# Patient Record
Sex: MALE | Race: WHITE | Employment: FULL TIME | URBAN - METROPOLITAN AREA
[De-identification: names, ages, dates, MRNs, and addresses within clinical notes are randomized per-mention and may not be internally consistent; named-entity substitution may affect disease eponyms.]

---

## 2018-07-21 ENCOUNTER — HOSPITAL ENCOUNTER (OUTPATIENT)
Age: 58
Discharge: HOME | End: 2018-07-21
Payer: COMMERCIAL

## 2018-07-21 LAB
ALBUMIN SERPL-MCNC: 4.6 G/DL (ref 3.5–5.2)
ALP BLD-CCNC: 34 U/L (ref 40–129)
ALT SERPL-CCNC: 20 U/L (ref 0–40)
ANION GAP SERPL CALCULATED.3IONS-SCNC: 11 MMOL/L (ref 7–16)
AST SERPL-CCNC: 18 U/L (ref 0–39)
BACTERIA: NORMAL /HPF
BASOPHILS ABSOLUTE: 0.09 E9/L (ref 0–0.2)
BASOPHILS RELATIVE PERCENT: 1 % (ref 0–2)
BILIRUB SERPL-MCNC: 0.6 MG/DL (ref 0–1.2)
BILIRUBIN URINE: NEGATIVE
BLOOD, URINE: ABNORMAL
BUN BLDV-MCNC: 26 MG/DL (ref 6–20)
CALCIUM SERPL-MCNC: 9.9 MG/DL (ref 8.6–10.2)
CHLORIDE BLD-SCNC: 102 MMOL/L (ref 98–107)
CHOLESTEROL, TOTAL: 151 MG/DL (ref 0–199)
CLARITY: CLEAR
CO2: 25 MMOL/L (ref 22–29)
COLOR: YELLOW
CREAT SERPL-MCNC: 1.2 MG/DL (ref 0.7–1.2)
EOSINOPHILS ABSOLUTE: 0.52 E9/L (ref 0.05–0.5)
EOSINOPHILS RELATIVE PERCENT: 6 % (ref 0–6)
GFR AFRICAN AMERICAN: >60
GFR NON-AFRICAN AMERICAN: >60 ML/MIN/1.73
GLUCOSE BLD-MCNC: 128 MG/DL (ref 74–109)
GLUCOSE URINE: NEGATIVE MG/DL
HBA1C MFR BLD: 6.1 % (ref 4–5.6)
HCT VFR BLD CALC: 41.9 % (ref 37–54)
HDLC SERPL-MCNC: 34 MG/DL
HEMOGLOBIN: 13.8 G/DL (ref 12.5–16.5)
IMMATURE GRANULOCYTES #: 0.1 E9/L
IMMATURE GRANULOCYTES %: 1.1 % (ref 0–5)
KETONES, URINE: NEGATIVE MG/DL
LDL CHOLESTEROL CALCULATED: 73 MG/DL (ref 0–99)
LEUKOCYTE ESTERASE, URINE: NEGATIVE
LYMPHOCYTES ABSOLUTE: 2.87 E9/L (ref 1.5–4)
LYMPHOCYTES RELATIVE PERCENT: 33 % (ref 20–42)
MCH RBC QN AUTO: 29.8 PG (ref 26–35)
MCHC RBC AUTO-ENTMCNC: 32.9 % (ref 32–34.5)
MCV RBC AUTO: 90.5 FL (ref 80–99.9)
MONOCYTES ABSOLUTE: 0.74 E9/L (ref 0.1–0.95)
MONOCYTES RELATIVE PERCENT: 8.5 % (ref 2–12)
NEUTROPHILS ABSOLUTE: 4.39 E9/L (ref 1.8–7.3)
NEUTROPHILS RELATIVE PERCENT: 50.4 % (ref 43–80)
NITRITE, URINE: NEGATIVE
PDW BLD-RTO: 13.2 FL (ref 11.5–15)
PH UA: 6 (ref 5–9)
PLATELET # BLD: 308 E9/L (ref 130–450)
PMV BLD AUTO: 8.6 FL (ref 7–12)
POTASSIUM SERPL-SCNC: 4.6 MMOL/L (ref 3.5–5)
PROSTATE SPECIFIC ANTIGEN: 1.44 NG/ML (ref 0–4)
PROTEIN UA: NEGATIVE MG/DL
RBC # BLD: 4.63 E12/L (ref 3.8–5.8)
RBC UA: NORMAL /HPF (ref 0–2)
SODIUM BLD-SCNC: 138 MMOL/L (ref 132–146)
SPECIFIC GRAVITY UA: 1.02 (ref 1–1.03)
TOTAL PROTEIN: 7.3 G/DL (ref 6.4–8.3)
TRIGL SERPL-MCNC: 218 MG/DL (ref 0–149)
UROBILINOGEN, URINE: 0.2 E.U./DL
VLDLC SERPL CALC-MCNC: 44 MG/DL
WBC # BLD: 8.7 E9/L (ref 4.5–11.5)
WBC UA: NORMAL /HPF (ref 0–5)

## 2018-07-21 PROCEDURE — 81001 URINALYSIS AUTO W/SCOPE: CPT

## 2018-07-21 PROCEDURE — 80053 COMPREHEN METABOLIC PANEL: CPT

## 2018-07-21 PROCEDURE — 80061 LIPID PANEL: CPT

## 2018-07-21 PROCEDURE — 85025 COMPLETE CBC W/AUTO DIFF WBC: CPT

## 2018-07-21 PROCEDURE — G0103 PSA SCREENING: HCPCS

## 2018-07-21 PROCEDURE — 36415 COLL VENOUS BLD VENIPUNCTURE: CPT

## 2018-07-21 PROCEDURE — 83036 HEMOGLOBIN GLYCOSYLATED A1C: CPT

## 2019-02-23 ENCOUNTER — HOSPITAL ENCOUNTER (OUTPATIENT)
Age: 59
Discharge: HOME | End: 2019-02-23
Payer: COMMERCIAL

## 2019-02-23 LAB
ALBUMIN SERPL-MCNC: 4.8 G/DL (ref 3.5–5.2)
ALP BLD-CCNC: 38 U/L (ref 40–129)
ALT SERPL-CCNC: 26 U/L (ref 0–40)
ANION GAP SERPL CALCULATED.3IONS-SCNC: 13 MMOL/L (ref 7–16)
AST SERPL-CCNC: 21 U/L (ref 0–39)
BASOPHILS ABSOLUTE: 0.07 E9/L (ref 0–0.2)
BASOPHILS RELATIVE PERCENT: 1 % (ref 0–2)
BILIRUB SERPL-MCNC: 0.8 MG/DL (ref 0–1.2)
BILIRUBIN URINE: NEGATIVE
BLOOD, URINE: NEGATIVE
BUN BLDV-MCNC: 24 MG/DL (ref 6–20)
CALCIUM SERPL-MCNC: 9.9 MG/DL (ref 8.6–10.2)
CHLORIDE BLD-SCNC: 102 MMOL/L (ref 98–107)
CHOLESTEROL, TOTAL: 136 MG/DL (ref 0–199)
CLARITY: CLEAR
CO2: 26 MMOL/L (ref 22–29)
COLOR: YELLOW
CREAT SERPL-MCNC: 1.2 MG/DL (ref 0.7–1.2)
EOSINOPHILS ABSOLUTE: 0.45 E9/L (ref 0.05–0.5)
EOSINOPHILS RELATIVE PERCENT: 6.1 % (ref 0–6)
GFR AFRICAN AMERICAN: >60
GFR NON-AFRICAN AMERICAN: >60 ML/MIN/1.73
GLUCOSE BLD-MCNC: 135 MG/DL (ref 74–99)
GLUCOSE URINE: NEGATIVE MG/DL
HBA1C MFR BLD: 6.2 % (ref 4–5.6)
HCT VFR BLD CALC: 44.2 % (ref 37–54)
HDLC SERPL-MCNC: 36 MG/DL
HEMOGLOBIN: 14.5 G/DL (ref 12.5–16.5)
IMMATURE GRANULOCYTES #: 0.07 E9/L
IMMATURE GRANULOCYTES %: 1 % (ref 0–5)
KETONES, URINE: NEGATIVE MG/DL
LDL CHOLESTEROL CALCULATED: 60 MG/DL (ref 0–99)
LEUKOCYTE ESTERASE, URINE: NEGATIVE
LYMPHOCYTES ABSOLUTE: 2.52 E9/L (ref 1.5–4)
LYMPHOCYTES RELATIVE PERCENT: 34.4 % (ref 20–42)
MCH RBC QN AUTO: 31 PG (ref 26–35)
MCHC RBC AUTO-ENTMCNC: 32.8 % (ref 32–34.5)
MCV RBC AUTO: 94.6 FL (ref 80–99.9)
MONOCYTES ABSOLUTE: 0.63 E9/L (ref 0.1–0.95)
MONOCYTES RELATIVE PERCENT: 8.6 % (ref 2–12)
NEUTROPHILS ABSOLUTE: 3.58 E9/L (ref 1.8–7.3)
NEUTROPHILS RELATIVE PERCENT: 48.9 % (ref 43–80)
NITRITE, URINE: NEGATIVE
PDW BLD-RTO: 12.6 FL (ref 11.5–15)
PH UA: 7 (ref 5–9)
PLATELET # BLD: 307 E9/L (ref 130–450)
PMV BLD AUTO: 8.8 FL (ref 7–12)
POTASSIUM SERPL-SCNC: 4.5 MMOL/L (ref 3.5–5)
PROTEIN UA: NEGATIVE MG/DL
RBC # BLD: 4.67 E12/L (ref 3.8–5.8)
SODIUM BLD-SCNC: 141 MMOL/L (ref 132–146)
SPECIFIC GRAVITY UA: 1.01 (ref 1–1.03)
TOTAL PROTEIN: 7.6 G/DL (ref 6.4–8.3)
TRIGL SERPL-MCNC: 201 MG/DL (ref 0–149)
UROBILINOGEN, URINE: 0.2 E.U./DL
VLDLC SERPL CALC-MCNC: 40 MG/DL
WBC # BLD: 7.3 E9/L (ref 4.5–11.5)

## 2019-02-23 PROCEDURE — 83036 HEMOGLOBIN GLYCOSYLATED A1C: CPT

## 2019-02-23 PROCEDURE — 85025 COMPLETE CBC W/AUTO DIFF WBC: CPT

## 2019-02-23 PROCEDURE — 36415 COLL VENOUS BLD VENIPUNCTURE: CPT

## 2019-02-23 PROCEDURE — 81003 URINALYSIS AUTO W/O SCOPE: CPT

## 2019-02-23 PROCEDURE — 80061 LIPID PANEL: CPT

## 2019-02-23 PROCEDURE — 80053 COMPREHEN METABOLIC PANEL: CPT

## 2019-09-06 ENCOUNTER — HOSPITAL ENCOUNTER (OUTPATIENT)
Age: 59
Discharge: HOME | End: 2019-09-06
Payer: COMMERCIAL

## 2019-09-06 LAB
ALBUMIN SERPL-MCNC: 4.9 G/DL (ref 3.5–5.2)
ALP BLD-CCNC: 40 U/L (ref 40–129)
ALT SERPL-CCNC: 19 U/L (ref 0–40)
ANION GAP SERPL CALCULATED.3IONS-SCNC: 13 MMOL/L (ref 7–16)
AST SERPL-CCNC: 21 U/L (ref 0–39)
BILIRUB SERPL-MCNC: 0.8 MG/DL (ref 0–1.2)
BUN BLDV-MCNC: 27 MG/DL (ref 8–23)
CALCIUM SERPL-MCNC: 10 MG/DL (ref 8.6–10.2)
CHLORIDE BLD-SCNC: 102 MMOL/L (ref 98–107)
CHOLESTEROL, TOTAL: 147 MG/DL (ref 0–199)
CO2: 26 MMOL/L (ref 22–29)
CREAT SERPL-MCNC: 1.2 MG/DL (ref 0.7–1.2)
CREATININE URINE: 88 MG/DL (ref 40–278)
GFR AFRICAN AMERICAN: >60
GFR NON-AFRICAN AMERICAN: >60 ML/MIN/1.73
GLUCOSE BLD-MCNC: 144 MG/DL (ref 74–99)
HBA1C MFR BLD: 6.3 % (ref 4–5.6)
HDLC SERPL-MCNC: 34 MG/DL
LDL CHOLESTEROL CALCULATED: 70 MG/DL (ref 0–99)
MICROALBUMIN UR-MCNC: <12 MG/L
MICROALBUMIN/CREAT UR-RTO: ABNORMAL (ref 0–30)
POTASSIUM SERPL-SCNC: 4.6 MMOL/L (ref 3.5–5)
SODIUM BLD-SCNC: 141 MMOL/L (ref 132–146)
TOTAL PROTEIN: 7.5 G/DL (ref 6.4–8.3)
TRIGL SERPL-MCNC: 213 MG/DL (ref 0–149)
VLDLC SERPL CALC-MCNC: 43 MG/DL

## 2019-09-06 PROCEDURE — 36415 COLL VENOUS BLD VENIPUNCTURE: CPT

## 2019-09-06 PROCEDURE — 83036 HEMOGLOBIN GLYCOSYLATED A1C: CPT

## 2019-09-06 PROCEDURE — 80053 COMPREHEN METABOLIC PANEL: CPT

## 2019-09-06 PROCEDURE — 86787 VARICELLA-ZOSTER ANTIBODY: CPT

## 2019-09-06 PROCEDURE — 82044 UR ALBUMIN SEMIQUANTITATIVE: CPT

## 2019-09-06 PROCEDURE — 82570 ASSAY OF URINE CREATININE: CPT

## 2019-09-06 PROCEDURE — 80061 LIPID PANEL: CPT

## 2019-09-10 LAB — VARICELLA-ZOSTER VIRUS AB, IGG: NORMAL

## 2020-03-12 ENCOUNTER — HOSPITAL ENCOUNTER (OUTPATIENT)
Age: 60
Discharge: HOME | End: 2020-03-12
Payer: COMMERCIAL

## 2020-03-12 LAB
ALBUMIN SERPL-MCNC: 4.9 G/DL (ref 3.5–5.2)
ALP BLD-CCNC: 42 U/L (ref 40–129)
ALT SERPL-CCNC: 28 U/L (ref 0–40)
ANION GAP SERPL CALCULATED.3IONS-SCNC: 14 MMOL/L (ref 7–16)
AST SERPL-CCNC: 24 U/L (ref 0–39)
BACTERIA: NORMAL /HPF
BASOPHILS ABSOLUTE: 0.06 E9/L (ref 0–0.2)
BASOPHILS RELATIVE PERCENT: 0.8 % (ref 0–2)
BILIRUB SERPL-MCNC: 1 MG/DL (ref 0–1.2)
BILIRUBIN URINE: NEGATIVE
BLOOD, URINE: NORMAL
BUN BLDV-MCNC: 21 MG/DL (ref 8–23)
CALCIUM SERPL-MCNC: 10 MG/DL (ref 8.6–10.2)
CHLORIDE BLD-SCNC: 101 MMOL/L (ref 98–107)
CHOLESTEROL, TOTAL: 143 MG/DL (ref 0–199)
CLARITY: CLEAR
CO2: 24 MMOL/L (ref 22–29)
COLOR: YELLOW
CREAT SERPL-MCNC: 1.2 MG/DL (ref 0.7–1.2)
EOSINOPHILS ABSOLUTE: 0.37 E9/L (ref 0.05–0.5)
EOSINOPHILS RELATIVE PERCENT: 4.7 % (ref 0–6)
GFR AFRICAN AMERICAN: >60
GFR NON-AFRICAN AMERICAN: >60 ML/MIN/1.73
GLUCOSE BLD-MCNC: 136 MG/DL (ref 74–99)
GLUCOSE URINE: NEGATIVE MG/DL
HBA1C MFR BLD: 6.7 % (ref 4–5.6)
HCT VFR BLD CALC: 44.4 % (ref 37–54)
HDLC SERPL-MCNC: 34 MG/DL
HEMOGLOBIN: 14.8 G/DL (ref 12.5–16.5)
IMMATURE GRANULOCYTES #: 0.04 E9/L
IMMATURE GRANULOCYTES %: 0.5 % (ref 0–5)
KETONES, URINE: NEGATIVE MG/DL
LDL CHOLESTEROL CALCULATED: 63 MG/DL (ref 0–99)
LEUKOCYTE ESTERASE, URINE: NEGATIVE
LYMPHOCYTES ABSOLUTE: 2.76 E9/L (ref 1.5–4)
LYMPHOCYTES RELATIVE PERCENT: 35.4 % (ref 20–42)
MCH RBC QN AUTO: 31.2 PG (ref 26–35)
MCHC RBC AUTO-ENTMCNC: 33.3 % (ref 32–34.5)
MCV RBC AUTO: 93.5 FL (ref 80–99.9)
MONOCYTES ABSOLUTE: 0.79 E9/L (ref 0.1–0.95)
MONOCYTES RELATIVE PERCENT: 10.1 % (ref 2–12)
NEUTROPHILS ABSOLUTE: 3.78 E9/L (ref 1.8–7.3)
NEUTROPHILS RELATIVE PERCENT: 48.5 % (ref 43–80)
NITRITE, URINE: NEGATIVE
PDW BLD-RTO: 12.8 FL (ref 11.5–15)
PH UA: 6 (ref 5–9)
PLATELET # BLD: 298 E9/L (ref 130–450)
PMV BLD AUTO: 8.6 FL (ref 7–12)
POTASSIUM SERPL-SCNC: 4 MMOL/L (ref 3.5–5)
PROSTATE SPECIFIC ANTIGEN: 1.26 NG/ML (ref 0–4)
PROTEIN UA: NEGATIVE MG/DL
RBC # BLD: 4.75 E12/L (ref 3.8–5.8)
RBC UA: NORMAL /HPF (ref 0–2)
SODIUM BLD-SCNC: 139 MMOL/L (ref 132–146)
SPECIFIC GRAVITY UA: 1.02 (ref 1–1.03)
TOTAL PROTEIN: 7.7 G/DL (ref 6.4–8.3)
TRIGL SERPL-MCNC: 228 MG/DL (ref 0–149)
TSH SERPL DL<=0.05 MIU/L-ACNC: 2.58 UIU/ML (ref 0.27–4.2)
UROBILINOGEN, URINE: 0.2 E.U./DL
VLDLC SERPL CALC-MCNC: 46 MG/DL
WBC # BLD: 7.8 E9/L (ref 4.5–11.5)
WBC UA: NORMAL /HPF (ref 0–5)

## 2020-03-12 PROCEDURE — 83036 HEMOGLOBIN GLYCOSYLATED A1C: CPT

## 2020-03-12 PROCEDURE — 84443 ASSAY THYROID STIM HORMONE: CPT

## 2020-03-12 PROCEDURE — 81001 URINALYSIS AUTO W/SCOPE: CPT

## 2020-03-12 PROCEDURE — G0103 PSA SCREENING: HCPCS

## 2020-03-12 PROCEDURE — 80053 COMPREHEN METABOLIC PANEL: CPT

## 2020-03-12 PROCEDURE — 80061 LIPID PANEL: CPT

## 2020-03-12 PROCEDURE — 85025 COMPLETE CBC W/AUTO DIFF WBC: CPT

## 2020-03-12 PROCEDURE — 36415 COLL VENOUS BLD VENIPUNCTURE: CPT

## 2020-09-12 ENCOUNTER — HOSPITAL ENCOUNTER (OUTPATIENT)
Age: 60
Discharge: HOME | End: 2020-09-12
Payer: COMMERCIAL

## 2020-09-12 LAB
ALBUMIN SERPL-MCNC: 4.9 G/DL (ref 3.5–5.2)
ALP BLD-CCNC: 39 U/L (ref 40–129)
ALT SERPL-CCNC: 24 U/L (ref 0–40)
ANION GAP SERPL CALCULATED.3IONS-SCNC: 11 MMOL/L (ref 7–16)
AST SERPL-CCNC: 26 U/L (ref 0–39)
BILIRUB SERPL-MCNC: 0.8 MG/DL (ref 0–1.2)
BUN BLDV-MCNC: 30 MG/DL (ref 8–23)
CALCIUM SERPL-MCNC: 9.8 MG/DL (ref 8.6–10.2)
CHLORIDE BLD-SCNC: 102 MMOL/L (ref 98–107)
CHOLESTEROL, TOTAL: 125 MG/DL (ref 0–199)
CO2: 25 MMOL/L (ref 22–29)
CREAT SERPL-MCNC: 1.1 MG/DL (ref 0.7–1.2)
CREATININE URINE: 174 MG/DL (ref 40–278)
GFR AFRICAN AMERICAN: >60
GFR NON-AFRICAN AMERICAN: >60 ML/MIN/1.73
GLUCOSE BLD-MCNC: 138 MG/DL (ref 74–99)
HBA1C MFR BLD: 6.3 % (ref 4–5.6)
HDLC SERPL-MCNC: 36 MG/DL
LDL CHOLESTEROL CALCULATED: 55 MG/DL (ref 0–99)
MICROALBUMIN UR-MCNC: <12 MG/L
MICROALBUMIN/CREAT UR-RTO: ABNORMAL (ref 0–30)
POTASSIUM SERPL-SCNC: 4.2 MMOL/L (ref 3.5–5)
SODIUM BLD-SCNC: 138 MMOL/L (ref 132–146)
TOTAL PROTEIN: 7.6 G/DL (ref 6.4–8.3)
TRIGL SERPL-MCNC: 169 MG/DL (ref 0–149)
VLDLC SERPL CALC-MCNC: 34 MG/DL

## 2020-09-12 PROCEDURE — 83036 HEMOGLOBIN GLYCOSYLATED A1C: CPT

## 2020-09-12 PROCEDURE — 80061 LIPID PANEL: CPT

## 2020-09-12 PROCEDURE — 80053 COMPREHEN METABOLIC PANEL: CPT

## 2020-09-12 PROCEDURE — 36415 COLL VENOUS BLD VENIPUNCTURE: CPT

## 2020-09-12 PROCEDURE — 82570 ASSAY OF URINE CREATININE: CPT

## 2020-09-12 PROCEDURE — 82044 UR ALBUMIN SEMIQUANTITATIVE: CPT

## 2021-02-12 ENCOUNTER — HOSPITAL ENCOUNTER (OUTPATIENT)
Age: 61
Discharge: HOME | End: 2021-02-12
Payer: COMMERCIAL

## 2021-02-12 LAB
ALBUMIN SERPL-MCNC: 4.9 G/DL (ref 3.5–5.2)
ALP BLD-CCNC: 38 U/L (ref 40–129)
ALT SERPL-CCNC: 23 U/L (ref 0–40)
ANION GAP SERPL CALCULATED.3IONS-SCNC: 11 MMOL/L (ref 7–16)
AST SERPL-CCNC: 19 U/L (ref 0–39)
BILIRUB SERPL-MCNC: 0.8 MG/DL (ref 0–1.2)
BUN BLDV-MCNC: 34 MG/DL (ref 8–23)
CALCIUM SERPL-MCNC: 10.2 MG/DL (ref 8.6–10.2)
CHLORIDE BLD-SCNC: 99 MMOL/L (ref 98–107)
CHOLESTEROL, TOTAL: 146 MG/DL (ref 0–199)
CO2: 28 MMOL/L (ref 22–29)
CREAT SERPL-MCNC: 1.1 MG/DL (ref 0.7–1.2)
GFR AFRICAN AMERICAN: >60
GFR NON-AFRICAN AMERICAN: >60 ML/MIN/1.73
GLUCOSE BLD-MCNC: 141 MG/DL (ref 74–99)
HBA1C MFR BLD: 6.4 % (ref 4–5.6)
HCT VFR BLD CALC: 44.3 % (ref 37–54)
HDLC SERPL-MCNC: 37 MG/DL
HEMOGLOBIN: 14.8 G/DL (ref 12.5–16.5)
LDL CHOLESTEROL CALCULATED: 70 MG/DL (ref 0–99)
MCH RBC QN AUTO: 31.6 PG (ref 26–35)
MCHC RBC AUTO-ENTMCNC: 33.4 % (ref 32–34.5)
MCV RBC AUTO: 94.7 FL (ref 80–99.9)
PDW BLD-RTO: 12.9 FL (ref 11.5–15)
PLATELET # BLD: 295 E9/L (ref 130–450)
PMV BLD AUTO: 8.7 FL (ref 7–12)
POTASSIUM SERPL-SCNC: 4.5 MMOL/L (ref 3.5–5)
PROSTATE SPECIFIC ANTIGEN: 1.22 NG/ML (ref 0–4)
RBC # BLD: 4.68 E12/L (ref 3.8–5.8)
SODIUM BLD-SCNC: 138 MMOL/L (ref 132–146)
TOTAL PROTEIN: 7.6 G/DL (ref 6.4–8.3)
TRIGL SERPL-MCNC: 196 MG/DL (ref 0–149)
VLDLC SERPL CALC-MCNC: 39 MG/DL
WBC # BLD: 7.5 E9/L (ref 4.5–11.5)

## 2021-02-12 PROCEDURE — 83036 HEMOGLOBIN GLYCOSYLATED A1C: CPT

## 2021-02-12 PROCEDURE — G0103 PSA SCREENING: HCPCS

## 2021-02-12 PROCEDURE — 80053 COMPREHEN METABOLIC PANEL: CPT

## 2021-02-12 PROCEDURE — 36415 COLL VENOUS BLD VENIPUNCTURE: CPT

## 2021-02-12 PROCEDURE — 80061 LIPID PANEL: CPT

## 2021-02-12 PROCEDURE — 85027 COMPLETE CBC AUTOMATED: CPT

## 2021-02-17 ENCOUNTER — HOSPITAL ENCOUNTER (EMERGENCY)
Age: 61
Discharge: HOME | End: 2021-02-17
Attending: EMERGENCY MEDICINE
Payer: COMMERCIAL

## 2021-02-17 ENCOUNTER — APPOINTMENT (OUTPATIENT)
Dept: CT IMAGING | Age: 61
End: 2021-02-17
Payer: COMMERCIAL

## 2021-02-17 ENCOUNTER — APPOINTMENT (OUTPATIENT)
Dept: GENERAL RADIOLOGY | Age: 61
End: 2021-02-17
Payer: COMMERCIAL

## 2021-02-17 VITALS
HEART RATE: 94 BPM | BODY MASS INDEX: 32.47 KG/M2 | DIASTOLIC BLOOD PRESSURE: 70 MMHG | OXYGEN SATURATION: 98 % | TEMPERATURE: 98.9 F | WEIGHT: 245 LBS | HEIGHT: 73 IN | RESPIRATION RATE: 16 BRPM | SYSTOLIC BLOOD PRESSURE: 148 MMHG

## 2021-02-17 DIAGNOSIS — S09.90XA CLOSED HEAD INJURY, INITIAL ENCOUNTER: Primary | ICD-10-CM

## 2021-02-17 DIAGNOSIS — S06.0X0A CONCUSSION WITHOUT LOSS OF CONSCIOUSNESS, INITIAL ENCOUNTER: ICD-10-CM

## 2021-02-17 PROCEDURE — 99283 EMERGENCY DEPT VISIT LOW MDM: CPT

## 2021-02-17 PROCEDURE — 71046 X-RAY EXAM CHEST 2 VIEWS: CPT

## 2021-02-17 PROCEDURE — 72125 CT NECK SPINE W/O DYE: CPT

## 2021-02-17 PROCEDURE — 70450 CT HEAD/BRAIN W/O DYE: CPT

## 2021-02-17 RX ORDER — MECLIZINE HCL 12.5 MG/1
12.5 TABLET ORAL 3 TIMES DAILY PRN
Qty: 30 TABLET | Refills: 0 | Status: SHIPPED | OUTPATIENT
Start: 2021-02-17 | End: 2021-02-27

## 2021-02-17 ASSESSMENT — ENCOUNTER SYMPTOMS
ABDOMINAL PAIN: 0
NAUSEA: 0
VOMITING: 0
TROUBLE SWALLOWING: 0
DIARRHEA: 0
SHORTNESS OF BREATH: 0
RHINORRHEA: 0
COUGH: 0
COLOR CHANGE: 0
BLOOD IN STOOL: 0

## 2021-02-17 ASSESSMENT — PAIN DESCRIPTION - ONSET: ONSET: ON-GOING

## 2021-02-17 ASSESSMENT — PAIN DESCRIPTION - DESCRIPTORS: DESCRIPTORS: SORE

## 2021-02-17 ASSESSMENT — PAIN SCALES - GENERAL: PAINLEVEL_OUTOF10: 5

## 2021-02-17 ASSESSMENT — PAIN DESCRIPTION - LOCATION: LOCATION: GENERALIZED

## 2021-02-17 ASSESSMENT — PAIN DESCRIPTION - PAIN TYPE: TYPE: ACUTE PAIN

## 2021-02-17 NOTE — ED PROVIDER NOTES
mellitus ([facility] [address]     dx 10/13; type II    Environmental allergies     Heart murmur     Heart murmur     Hyperlipidemia     Hypertension     stable per patient    Sleep apnea     uses c pap       Past Surgical History:   Past Surgical History:   Procedure Laterality Date    COLONOSCOPY  10/13/11    COLONOSCOPY  11/09/2016    DR BARRAZA    ECHO COMPL W DOP COLOR FLOW  2/27/2013         NASAL SINUS SURGERY      times 4    SINUS SURGERY Bilateral 7/18/2014    TONSILLECTOMY         Social History:   Social History     Socioeconomic History    Marital status:      Spouse name: Not on file    Number of children: 1    Years of education: 25    Highest education level: Not on file   Occupational History    Not on file   Social Needs    Financial resource strain: Not on file    Food insecurity     Worry: Not on file     Inability: Not on file   [employer] needs     Medical: Not on file     Non-medical: Not on file   Tobacco Use    Smoking status: Never Smoker   Substance and Sexual Activity    Alcohol use: No    Drug use: No    Sexual activity: Not on file   Lifestyle    Physical activity     Days per week: Not on file     Minutes per session: Not on file    Stress: Not on file   Relationships    Social connections     Talks on phone: Not on file     Gets together: Not on file     Attends Worship service: Not on file     Active member of club or organization: Not on file     Attends meetings of clubs or organizations: Not on file     Relationship status: Not on file    Intimate partner violence     Fear of current or ex partner: Not on file     Emotionally abused: Not on file     Physically abused: Not on file     Forced sexual activity: Not on file   Other Topics Concern    Not on file   Social History Narrative    Not on file       Family History:   No family history on file. The patients home medications have been reviewed.     Allergies:   No Known Allergies        ---------------------------------------------------PHYSICAL EXAM--------------------------------------    BP (!) 148/70   Pulse 94   Temp 98.9 °F (37.2 °C)   Resp 16   SpO2 98%     Physical Exam  Vitals signs and nursing note reviewed. Constitutional:       General: He is not in acute distress. Appearance: He is not toxic-appearing. HENT:      Head: Normocephalic and atraumatic. Mouth/Throat:      Mouth: Mucous membranes are moist.   Eyes:      General: No scleral icterus. Extraocular Movements: Extraocular movements intact. Pupils: Pupils are equal, round, and reactive to light. Neck:      Musculoskeletal: Normal range of motion and neck supple. Muscular tenderness (mild upper c-spine midline ttp. no stepoff/deformity) present. No neck rigidity. Cardiovascular:      Rate and Rhythm: Normal rate and regular rhythm. Pulses: Normal pulses. Heart sounds: Normal heart sounds. No murmur. Pulmonary:      Effort: Pulmonary effort is normal. No respiratory distress. Breath sounds: Normal breath sounds. No wheezing or rales. Chest:      Chest wall: No tenderness. Abdominal:      General: There is no distension. Palpations: Abdomen is soft. Tenderness: There is no abdominal tenderness. There is no guarding or rebound. Musculoskeletal: Normal range of motion. General: No swelling or tenderness. Skin:     General: Skin is warm and dry. Neurological:      Mental Status: He is alert and oriented to person, place, and time.       Comments: PERRL, EOMI, FS, TM, facial sensation intact to light touch bilaterally, shoulder shrug intact bilaterally, Strength 5/5 and sensation grossly intact to light touch and equal bilaterally throughout all extremities, no dysmetria, no ataxia, normal gait.            -------------------------------------------------- RESULTS -------------------------------------------------  I have personally reviewed all --------------------------------- IMPRESSION AND DISPOSITION ---------------------------------    IMPRESSION  1. Closed head injury, initial encounter    2. Concussion without loss of consciousness, initial encounter        DISPOSITION  Disposition: Discharge to home  Patient condition is good    NOTE: This report was transcribed using voice recognition software.  Every effort was made to ensure accuracy; however, inadvertent computerized transcription errors may be present    Marvin Bland MD  Attending Emergency Physician         Marvin Bland MD  02/17/21 8541

## 2022-04-01 ENCOUNTER — HOSPITAL ENCOUNTER (OUTPATIENT)
Age: 62
Discharge: HOME | End: 2022-04-01
Payer: COMMERCIAL

## 2022-04-01 LAB
ALBUMIN SERPL-MCNC: 5 G/DL (ref 3.5–5.2)
ALP BLD-CCNC: 34 U/L (ref 40–129)
ALT SERPL-CCNC: 30 U/L (ref 0–40)
ANION GAP SERPL CALCULATED.3IONS-SCNC: 12 MMOL/L (ref 7–16)
AST SERPL-CCNC: 31 U/L (ref 0–39)
BASOPHILS ABSOLUTE: 0.06 E9/L (ref 0–0.2)
BASOPHILS RELATIVE PERCENT: 0.9 % (ref 0–2)
BILIRUB SERPL-MCNC: 0.7 MG/DL (ref 0–1.2)
BUN BLDV-MCNC: 25 MG/DL (ref 6–23)
CALCIUM SERPL-MCNC: 10 MG/DL (ref 8.6–10.2)
CHLORIDE BLD-SCNC: 103 MMOL/L (ref 98–107)
CHOLESTEROL, TOTAL: 133 MG/DL (ref 0–199)
CO2: 25 MMOL/L (ref 22–29)
CREAT SERPL-MCNC: 1.1 MG/DL (ref 0.7–1.2)
CREATININE URINE: 80 MG/DL (ref 40–278)
EOSINOPHILS ABSOLUTE: 0.43 E9/L (ref 0.05–0.5)
EOSINOPHILS RELATIVE PERCENT: 6.7 % (ref 0–6)
GFR AFRICAN AMERICAN: >60
GFR NON-AFRICAN AMERICAN: >60 ML/MIN/1.73
GLUCOSE BLD-MCNC: 125 MG/DL (ref 74–99)
HBA1C MFR BLD: 5.7 % (ref 4–5.6)
HCT VFR BLD CALC: 41.6 % (ref 37–54)
HDLC SERPL-MCNC: 45 MG/DL
HEMOGLOBIN: 14 G/DL (ref 12.5–16.5)
IMMATURE GRANULOCYTES #: 0.02 E9/L
IMMATURE GRANULOCYTES %: 0.3 % (ref 0–5)
LDL CHOLESTEROL CALCULATED: 65 MG/DL (ref 0–99)
LYMPHOCYTES ABSOLUTE: 2.21 E9/L (ref 1.5–4)
LYMPHOCYTES RELATIVE PERCENT: 34.6 % (ref 20–42)
MCH RBC QN AUTO: 31.3 PG (ref 26–35)
MCHC RBC AUTO-ENTMCNC: 33.7 % (ref 32–34.5)
MCV RBC AUTO: 92.9 FL (ref 80–99.9)
MICROALBUMIN UR-MCNC: <12 MG/L
MICROALBUMIN/CREAT UR-RTO: ABNORMAL (ref 0–30)
MONOCYTES ABSOLUTE: 0.56 E9/L (ref 0.1–0.95)
MONOCYTES RELATIVE PERCENT: 8.8 % (ref 2–12)
NEUTROPHILS ABSOLUTE: 3.1 E9/L (ref 1.8–7.3)
NEUTROPHILS RELATIVE PERCENT: 48.7 % (ref 43–80)
PDW BLD-RTO: 12.8 FL (ref 11.5–15)
PLATELET # BLD: 309 E9/L (ref 130–450)
PMV BLD AUTO: 8.5 FL (ref 7–12)
POTASSIUM SERPL-SCNC: 3.9 MMOL/L (ref 3.5–5)
PROSTATE SPECIFIC ANTIGEN: 1.61 NG/ML (ref 0–4)
RBC # BLD: 4.48 E12/L (ref 3.8–5.8)
SODIUM BLD-SCNC: 140 MMOL/L (ref 132–146)
TOTAL PROTEIN: 7.5 G/DL (ref 6.4–8.3)
TRIGL SERPL-MCNC: 113 MG/DL (ref 0–149)
VLDLC SERPL CALC-MCNC: 23 MG/DL
WBC # BLD: 6.4 E9/L (ref 4.5–11.5)

## 2022-04-01 PROCEDURE — 36415 COLL VENOUS BLD VENIPUNCTURE: CPT

## 2022-04-01 PROCEDURE — G0103 PSA SCREENING: HCPCS

## 2022-04-01 PROCEDURE — 80053 COMPREHEN METABOLIC PANEL: CPT

## 2022-04-01 PROCEDURE — 82570 ASSAY OF URINE CREATININE: CPT

## 2022-04-01 PROCEDURE — 85025 COMPLETE CBC W/AUTO DIFF WBC: CPT

## 2022-04-01 PROCEDURE — 80061 LIPID PANEL: CPT

## 2022-04-01 PROCEDURE — 82044 UR ALBUMIN SEMIQUANTITATIVE: CPT

## 2022-04-01 PROCEDURE — 83036 HEMOGLOBIN GLYCOSYLATED A1C: CPT

## 2022-10-07 ENCOUNTER — HOSPITAL ENCOUNTER (OUTPATIENT)
Age: 62
Discharge: HOME | End: 2022-10-07
Payer: COMMERCIAL

## 2022-10-07 LAB
ALBUMIN SERPL-MCNC: 4.8 G/DL (ref 3.5–5.2)
ALP BLD-CCNC: 39 U/L (ref 40–129)
ALT SERPL-CCNC: 29 U/L (ref 0–40)
ANION GAP SERPL CALCULATED.3IONS-SCNC: 11 MMOL/L (ref 7–16)
AST SERPL-CCNC: 27 U/L (ref 0–39)
BILIRUB SERPL-MCNC: 0.5 MG/DL (ref 0–1.2)
BUN BLDV-MCNC: 23 MG/DL (ref 6–23)
CALCIUM SERPL-MCNC: 9.9 MG/DL (ref 8.6–10.2)
CHLORIDE BLD-SCNC: 101 MMOL/L (ref 98–107)
CHOLESTEROL, TOTAL: 152 MG/DL (ref 0–199)
CO2: 26 MMOL/L (ref 22–29)
CREAT SERPL-MCNC: 1.2 MG/DL (ref 0.7–1.2)
GFR AFRICAN AMERICAN: >60
GFR NON-AFRICAN AMERICAN: >60 ML/MIN/1.73
GLUCOSE BLD-MCNC: 120 MG/DL (ref 74–99)
HBA1C MFR BLD: 5.8 % (ref 4–5.6)
HDLC SERPL-MCNC: 39 MG/DL
LDL CHOLESTEROL CALCULATED: 87 MG/DL (ref 0–99)
POTASSIUM SERPL-SCNC: 3.9 MMOL/L (ref 3.5–5)
SODIUM BLD-SCNC: 138 MMOL/L (ref 132–146)
TOTAL PROTEIN: 7.3 G/DL (ref 6.4–8.3)
TRIGL SERPL-MCNC: 129 MG/DL (ref 0–149)
VLDLC SERPL CALC-MCNC: 26 MG/DL

## 2022-10-07 PROCEDURE — 80053 COMPREHEN METABOLIC PANEL: CPT

## 2022-10-07 PROCEDURE — 36415 COLL VENOUS BLD VENIPUNCTURE: CPT

## 2022-10-07 PROCEDURE — 83036 HEMOGLOBIN GLYCOSYLATED A1C: CPT

## 2022-10-07 PROCEDURE — 80061 LIPID PANEL: CPT

## 2022-11-01 RX ORDER — FENOFIBRATE 160 MG/1
160 TABLET ORAL DAILY
COMMUNITY

## 2022-11-01 NOTE — PROGRESS NOTES
[facility] PRE-ADMISSION TESTING INSTRUCTIONS    The Preadmission Testing patient is instructed accordingly using the following criteria (check applicable):    ARRIVAL INSTRUCTIONS:  [x] Parking the day of Surgery is located in the Main Entrance lot. Upon entering the door, make an immediate right to the surgery reception desk    [x] Bring photo ID and insurance card    [x] Please be sure to arrange for responsible adult to provide transportation to and from the hospital    [x] Please arrange for responsible adult to be with you for the 24 hour period post procedure due to having anesthesia    [x] If you awake am of surgery not feeling well or have temperature >100 please call [phone]    GENERAL INSTRUCTIONS:    [x] Nothing by mouth after midnight, including gum, candy, mints or water    [x] You may brush your teeth, but do not swallow any water    [x] Stop herbal supplements and vitamins 5 days prior to procedure    [] Follow preop dosing of blood thinners per physician instructions    [x] No oral diabetic medications after midnight    [x] If diabetic and have low blood sugar or feel symptomatic, take 1-2oz apple juice only    [x] Shower or bath with soap, lather and rinse well, AM of Surgery, no lotion, powders or creams to surgical site    [x] No tobacco products within 24 hours of surgery     [x] No alcohol or illegal drug use within 24 hours of surgery.     [x] Jewelry, body piercing's, eyeglasses, contact lenses and dentures are not permitted into surgery (bring cases)      [x] Please do not wear any nail polish, make up or hair products on the day of surgery    [x] You can expect a call the business day prior to procedure to notify you if your arrival time changes    [x] If you receive a survey after surgery we would greatly appreciate your comments    [x] Please notify surgeon if you develop any illness between now and time of surgery (cold, cough, sore throat, fever, nausea, vomiting) or any signs of infections  including skin, wounds, and dental.    [x]  The Outpatient Pharmacy is available to fill your prescription here on your day of surgery, ask your preop nurse for details

## 2022-11-03 ENCOUNTER — ANESTHESIA EVENT (OUTPATIENT)
Dept: OPERATING ROOM | Age: 62
End: 2022-11-03
Payer: COMMERCIAL

## 2022-11-04 ENCOUNTER — ANESTHESIA (OUTPATIENT)
Dept: OPERATING ROOM | Age: 62
End: 2022-11-04
Payer: COMMERCIAL

## 2022-11-04 ENCOUNTER — HOSPITAL ENCOUNTER (OUTPATIENT)
Age: 62
Setting detail: OUTPATIENT SURGERY
Discharge: HOME | End: 2022-11-04
Attending: OTOLARYNGOLOGY | Admitting: OTOLARYNGOLOGY
Payer: COMMERCIAL

## 2022-11-04 VITALS
RESPIRATION RATE: 18 BRPM | HEIGHT: 72 IN | BODY MASS INDEX: 31.83 KG/M2 | TEMPERATURE: 97.8 F | WEIGHT: 235 LBS | DIASTOLIC BLOOD PRESSURE: 81 MMHG | HEART RATE: 77 BPM | SYSTOLIC BLOOD PRESSURE: 141 MMHG | OXYGEN SATURATION: 95 %

## 2022-11-04 DIAGNOSIS — J32.4 CHRONIC PANSINUSITIS: ICD-10-CM

## 2022-11-04 DIAGNOSIS — J34.89 NASAL OBSTRUCTION: ICD-10-CM

## 2022-11-04 DIAGNOSIS — G89.18 POST-OP PAIN: Primary | ICD-10-CM

## 2022-11-04 LAB
ANION GAP SERPL CALCULATED.3IONS-SCNC: 12 MMOL/L (ref 7–16)
BUN BLDV-MCNC: 23 MG/DL (ref 6–23)
CALCIUM SERPL-MCNC: 10.1 MG/DL (ref 8.6–10.2)
CHLORIDE BLD-SCNC: 102 MMOL/L (ref 98–107)
CO2: 23 MMOL/L (ref 22–29)
CREAT SERPL-MCNC: 1.1 MG/DL (ref 0.7–1.2)
GFR SERPL CREATININE-BSD FRML MDRD: >60 ML/MIN/1.73
GLUCOSE BLD-MCNC: 120 MG/DL (ref 74–99)
HCT VFR BLD CALC: 42.7 % (ref 37–54)
HEMOGLOBIN: 14.4 G/DL (ref 12.5–16.5)
MCH RBC QN AUTO: 31.7 PG (ref 26–35)
MCHC RBC AUTO-ENTMCNC: 33.7 % (ref 32–34.5)
MCV RBC AUTO: 94.1 FL (ref 80–99.9)
PDW BLD-RTO: 12.8 FL (ref 11.5–15)
PLATELET # BLD: 348 E9/L (ref 130–450)
PMV BLD AUTO: 8.3 FL (ref 7–12)
POTASSIUM SERPL-SCNC: 4 MMOL/L (ref 3.5–5)
RBC # BLD: 4.54 E12/L (ref 3.8–5.8)
SODIUM BLD-SCNC: 137 MMOL/L (ref 132–146)
WBC # BLD: 8.6 E9/L (ref 4.5–11.5)

## 2022-11-04 PROCEDURE — 3700000001 HC ADD 15 MINUTES (ANESTHESIA): Performed by: OTOLARYNGOLOGY

## 2022-11-04 PROCEDURE — 2580000003 HC RX 258

## 2022-11-04 PROCEDURE — 7100000001 HC PACU RECOVERY - ADDTL 15 MIN: Performed by: OTOLARYNGOLOGY

## 2022-11-04 PROCEDURE — 6360000002 HC RX W HCPCS

## 2022-11-04 PROCEDURE — 3600000003 HC SURGERY LEVEL 3 BASE: Performed by: OTOLARYNGOLOGY

## 2022-11-04 PROCEDURE — 36415 COLL VENOUS BLD VENIPUNCTURE: CPT

## 2022-11-04 PROCEDURE — 7100000010 HC PHASE II RECOVERY - FIRST 15 MIN: Performed by: OTOLARYNGOLOGY

## 2022-11-04 PROCEDURE — 2709999900 HC NON-CHARGEABLE SUPPLY: Performed by: OTOLARYNGOLOGY

## 2022-11-04 PROCEDURE — 80048 BASIC METABOLIC PNL TOTAL CA: CPT

## 2022-11-04 PROCEDURE — 6370000000 HC RX 637 (ALT 250 FOR IP): Performed by: OTOLARYNGOLOGY

## 2022-11-04 PROCEDURE — 3700000000 HC ANESTHESIA ATTENDED CARE: Performed by: OTOLARYNGOLOGY

## 2022-11-04 PROCEDURE — 3600000013 HC SURGERY LEVEL 3 ADDTL 15MIN: Performed by: OTOLARYNGOLOGY

## 2022-11-04 PROCEDURE — 85027 COMPLETE CBC AUTOMATED: CPT

## 2022-11-04 PROCEDURE — 2720000010 HC SURG SUPPLY STERILE: Performed by: OTOLARYNGOLOGY

## 2022-11-04 PROCEDURE — 6370000000 HC RX 637 (ALT 250 FOR IP)

## 2022-11-04 PROCEDURE — C2625 STENT, NON-COR, TEM W/DEL SY: HCPCS | Performed by: OTOLARYNGOLOGY

## 2022-11-04 PROCEDURE — 6360000002 HC RX W HCPCS: Performed by: OTOLARYNGOLOGY

## 2022-11-04 PROCEDURE — 7100000000 HC PACU RECOVERY - FIRST 15 MIN: Performed by: OTOLARYNGOLOGY

## 2022-11-04 PROCEDURE — C1894 INTRO/SHEATH, NON-LASER: HCPCS | Performed by: OTOLARYNGOLOGY

## 2022-11-04 PROCEDURE — 2500000003 HC RX 250 WO HCPCS

## 2022-11-04 PROCEDURE — 93005 ELECTROCARDIOGRAM TRACING: CPT

## 2022-11-04 PROCEDURE — 2500000003 HC RX 250 WO HCPCS: Performed by: OTOLARYNGOLOGY

## 2022-11-04 PROCEDURE — C1726 CATH, BAL DIL, NON-VASCULAR: HCPCS | Performed by: OTOLARYNGOLOGY

## 2022-11-04 PROCEDURE — 7100000011 HC PHASE II RECOVERY - ADDTL 15 MIN: Performed by: OTOLARYNGOLOGY

## 2022-11-04 PROCEDURE — 2580000003 HC RX 258: Performed by: OTOLARYNGOLOGY

## 2022-11-04 PROCEDURE — 88305 TISSUE EXAM BY PATHOLOGIST: CPT

## 2022-11-04 DEVICE — PROPEL MINI SINUS IMPLANT
Type: IMPLANTABLE DEVICE | Site: NOSE | Status: FUNCTIONAL
Brand: PROPEL MINI

## 2022-11-04 RX ORDER — ROCURONIUM BROMIDE 10 MG/ML
INJECTION, SOLUTION INTRAVENOUS PRN
Status: DISCONTINUED | OUTPATIENT
Start: 2022-11-04 | End: 2022-11-04 | Stop reason: SDUPTHER

## 2022-11-04 RX ORDER — HYDROCODONE BITARTRATE AND ACETAMINOPHEN 5; 325 MG/1; MG/1
1 TABLET ORAL EVERY 6 HOURS PRN
Qty: 12 TABLET | Refills: 0 | Status: SHIPPED | OUTPATIENT
Start: 2022-11-04 | End: 2022-11-07

## 2022-11-04 RX ORDER — SODIUM CHLORIDE 0.9 % (FLUSH) 0.9 %
5-40 SYRINGE (ML) INJECTION PRN
Status: DISCONTINUED | OUTPATIENT
Start: 2022-11-04 | End: 2022-11-04 | Stop reason: HOSPADM

## 2022-11-04 RX ORDER — SODIUM CHLORIDE 9 MG/ML
INJECTION, SOLUTION INTRAVENOUS PRN
Status: DISCONTINUED | OUTPATIENT
Start: 2022-11-04 | End: 2022-11-04 | Stop reason: HOSPADM

## 2022-11-04 RX ORDER — ONDANSETRON 8 MG/1
8 TABLET, ORALLY DISINTEGRATING ORAL EVERY 8 HOURS PRN
Qty: 9 TABLET | Refills: 0 | Status: SHIPPED | OUTPATIENT
Start: 2022-11-04 | End: 2022-11-04 | Stop reason: SDUPTHER

## 2022-11-04 RX ORDER — PROPOFOL 10 MG/ML
INJECTION, EMULSION INTRAVENOUS PRN
Status: DISCONTINUED | OUTPATIENT
Start: 2022-11-04 | End: 2022-11-04 | Stop reason: SDUPTHER

## 2022-11-04 RX ORDER — CEPHALEXIN 500 MG/1
500 CAPSULE ORAL 3 TIMES DAILY
Qty: 15 CAPSULE | Refills: 0 | Status: SHIPPED | OUTPATIENT
Start: 2022-11-04 | End: 2022-11-04 | Stop reason: SDUPTHER

## 2022-11-04 RX ORDER — MORPHINE SULFATE 2 MG/ML
2 INJECTION, SOLUTION INTRAMUSCULAR; INTRAVENOUS EVERY 5 MIN PRN
Status: DISCONTINUED | OUTPATIENT
Start: 2022-11-04 | End: 2022-11-04 | Stop reason: HOSPADM

## 2022-11-04 RX ORDER — BACITRACIN ZINC 500 [USP'U]/G
OINTMENT TOPICAL PRN
Status: DISCONTINUED | OUTPATIENT
Start: 2022-11-04 | End: 2022-11-04 | Stop reason: ALTCHOICE

## 2022-11-04 RX ORDER — MORPHINE SULFATE 2 MG/ML
1 INJECTION, SOLUTION INTRAMUSCULAR; INTRAVENOUS EVERY 5 MIN PRN
Status: DISCONTINUED | OUTPATIENT
Start: 2022-11-04 | End: 2022-11-04 | Stop reason: HOSPADM

## 2022-11-04 RX ORDER — SODIUM CHLORIDE 0.9 % (FLUSH) 0.9 %
5-40 SYRINGE (ML) INJECTION EVERY 12 HOURS SCHEDULED
Status: DISCONTINUED | OUTPATIENT
Start: 2022-11-04 | End: 2022-11-04 | Stop reason: HOSPADM

## 2022-11-04 RX ORDER — MIDAZOLAM HYDROCHLORIDE 1 MG/ML
INJECTION INTRAMUSCULAR; INTRAVENOUS PRN
Status: DISCONTINUED | OUTPATIENT
Start: 2022-11-04 | End: 2022-11-04 | Stop reason: SDUPTHER

## 2022-11-04 RX ORDER — HYDROCODONE BITARTRATE AND ACETAMINOPHEN 5; 325 MG/1; MG/1
1 TABLET ORAL EVERY 6 HOURS PRN
Qty: 12 TABLET | Refills: 0 | Status: SHIPPED | OUTPATIENT
Start: 2022-11-04 | End: 2022-11-04 | Stop reason: SDUPTHER

## 2022-11-04 RX ORDER — SODIUM CHLORIDE, SODIUM LACTATE, POTASSIUM CHLORIDE, CALCIUM CHLORIDE 600; 310; 30; 20 MG/100ML; MG/100ML; MG/100ML; MG/100ML
INJECTION, SOLUTION INTRAVENOUS CONTINUOUS
Status: DISCONTINUED | OUTPATIENT
Start: 2022-11-04 | End: 2022-11-04 | Stop reason: HOSPADM

## 2022-11-04 RX ORDER — GLYCOPYRROLATE 0.2 MG/ML
INJECTION INTRAMUSCULAR; INTRAVENOUS PRN
Status: DISCONTINUED | OUTPATIENT
Start: 2022-11-04 | End: 2022-11-04 | Stop reason: SDUPTHER

## 2022-11-04 RX ORDER — ONDANSETRON 8 MG/1
8 TABLET, ORALLY DISINTEGRATING ORAL EVERY 8 HOURS PRN
Qty: 9 TABLET | Refills: 0 | Status: SHIPPED | OUTPATIENT
Start: 2022-11-04 | End: 2022-11-07

## 2022-11-04 RX ORDER — EPINEPHRINE NASAL SOLUTION 1 MG/ML
SOLUTION NASAL PRN
Status: DISCONTINUED | OUTPATIENT
Start: 2022-11-04 | End: 2022-11-04 | Stop reason: ALTCHOICE

## 2022-11-04 RX ORDER — LABETALOL HYDROCHLORIDE 5 MG/ML
INJECTION, SOLUTION INTRAVENOUS PRN
Status: DISCONTINUED | OUTPATIENT
Start: 2022-11-04 | End: 2022-11-04 | Stop reason: SDUPTHER

## 2022-11-04 RX ORDER — LIDOCAINE HYDROCHLORIDE AND EPINEPHRINE 10; 10 MG/ML; UG/ML
INJECTION, SOLUTION INFILTRATION; PERINEURAL PRN
Status: DISCONTINUED | OUTPATIENT
Start: 2022-11-04 | End: 2022-11-04 | Stop reason: ALTCHOICE

## 2022-11-04 RX ORDER — DEXAMETHASONE SODIUM PHOSPHATE 4 MG/ML
INJECTION, SOLUTION INTRA-ARTICULAR; INTRALESIONAL; INTRAMUSCULAR; INTRAVENOUS; SOFT TISSUE PRN
Status: DISCONTINUED | OUTPATIENT
Start: 2022-11-04 | End: 2022-11-04 | Stop reason: SDUPTHER

## 2022-11-04 RX ORDER — CEPHALEXIN 500 MG/1
500 CAPSULE ORAL 3 TIMES DAILY
Qty: 15 CAPSULE | Refills: 0 | Status: SHIPPED | OUTPATIENT
Start: 2022-11-04 | End: 2022-11-09

## 2022-11-04 RX ORDER — LIDOCAINE HYDROCHLORIDE 20 MG/ML
INJECTION, SOLUTION EPIDURAL; INFILTRATION; INTRACAUDAL; PERINEURAL PRN
Status: DISCONTINUED | OUTPATIENT
Start: 2022-11-04 | End: 2022-11-04 | Stop reason: SDUPTHER

## 2022-11-04 RX ORDER — MEPERIDINE HYDROCHLORIDE 25 MG/ML
12.5 INJECTION INTRAMUSCULAR; INTRAVENOUS; SUBCUTANEOUS EVERY 5 MIN PRN
Status: DISCONTINUED | OUTPATIENT
Start: 2022-11-04 | End: 2022-11-04 | Stop reason: HOSPADM

## 2022-11-04 RX ORDER — SODIUM CHLORIDE 9 MG/ML
INJECTION, SOLUTION INTRAVENOUS CONTINUOUS PRN
Status: DISCONTINUED | OUTPATIENT
Start: 2022-11-04 | End: 2022-11-04 | Stop reason: SDUPTHER

## 2022-11-04 RX ORDER — FENTANYL CITRATE 50 UG/ML
INJECTION, SOLUTION INTRAMUSCULAR; INTRAVENOUS PRN
Status: DISCONTINUED | OUTPATIENT
Start: 2022-11-04 | End: 2022-11-04 | Stop reason: SDUPTHER

## 2022-11-04 RX ORDER — HYDROCODONE BITARTRATE AND ACETAMINOPHEN 5; 325 MG/1; MG/1
1 TABLET ORAL ONCE
Status: COMPLETED | OUTPATIENT
Start: 2022-11-04 | End: 2022-11-04

## 2022-11-04 RX ORDER — SUCCINYLCHOLINE CHLORIDE 20 MG/ML
INJECTION INTRAMUSCULAR; INTRAVENOUS PRN
Status: DISCONTINUED | OUTPATIENT
Start: 2022-11-04 | End: 2022-11-04 | Stop reason: SDUPTHER

## 2022-11-04 RX ORDER — HYDROCODONE BITARTRATE AND ACETAMINOPHEN 5; 325 MG/1; MG/1
TABLET ORAL
Status: COMPLETED
Start: 2022-11-04 | End: 2022-11-04

## 2022-11-04 RX ADMIN — HYDROCODONE BITARTRATE AND ACETAMINOPHEN 1 TABLET: 5; 325 TABLET ORAL at 13:18

## 2022-11-04 RX ADMIN — FENTANYL CITRATE 100 MCG: 50 INJECTION, SOLUTION INTRAMUSCULAR; INTRAVENOUS at 11:09

## 2022-11-04 RX ADMIN — PROPOFOL 50 MG: 10 INJECTION, EMULSION INTRAVENOUS at 10:39

## 2022-11-04 RX ADMIN — ROCURONIUM BROMIDE 50 MG: 10 INJECTION, SOLUTION INTRAVENOUS at 10:29

## 2022-11-04 RX ADMIN — WATER 2000 MG: 1 INJECTION INTRAMUSCULAR; INTRAVENOUS; SUBCUTANEOUS at 10:35

## 2022-11-04 RX ADMIN — LABETALOL HYDROCHLORIDE 5 MG: 5 INJECTION INTRAVENOUS at 10:33

## 2022-11-04 RX ADMIN — SODIUM CHLORIDE: 9 INJECTION, SOLUTION INTRAVENOUS at 10:56

## 2022-11-04 RX ADMIN — LIDOCAINE HYDROCHLORIDE 60 MG: 20 INJECTION, SOLUTION EPIDURAL; INFILTRATION; INTRACAUDAL; PERINEURAL at 10:26

## 2022-11-04 RX ADMIN — SODIUM CHLORIDE: 9 INJECTION, SOLUTION INTRAVENOUS at 10:13

## 2022-11-04 RX ADMIN — SUGAMMADEX 500 MG: 100 INJECTION, SOLUTION INTRAVENOUS at 11:23

## 2022-11-04 RX ADMIN — PROPOFOL 200 MG: 10 INJECTION, EMULSION INTRAVENOUS at 10:26

## 2022-11-04 RX ADMIN — FENTANYL CITRATE 100 MCG: 50 INJECTION, SOLUTION INTRAMUSCULAR; INTRAVENOUS at 10:27

## 2022-11-04 RX ADMIN — DEXAMETHASONE SODIUM PHOSPHATE 8 MG: 4 INJECTION, SOLUTION INTRAMUSCULAR; INTRAVENOUS at 10:29

## 2022-11-04 RX ADMIN — SUCCINYLCHOLINE CHLORIDE 160 MG: 20 INJECTION, SOLUTION INTRAMUSCULAR; INTRAVENOUS at 10:26

## 2022-11-04 RX ADMIN — MIDAZOLAM 2 MG: 1 INJECTION INTRAMUSCULAR; INTRAVENOUS at 10:17

## 2022-11-04 RX ADMIN — GLYCOPYRROLATE 0.2 MG: 0.2 INJECTION, SOLUTION INTRAMUSCULAR; INTRAVENOUS at 10:30

## 2022-11-04 ASSESSMENT — PAIN SCALES - GENERAL
PAINLEVEL_OUTOF10: 0
PAINLEVEL_OUTOF10: 0
PAINLEVEL_OUTOF10: 2
PAINLEVEL_OUTOF10: 0
PAINLEVEL_OUTOF10: 7

## 2022-11-04 ASSESSMENT — LIFESTYLE VARIABLES: SMOKING_STATUS: 0

## 2022-11-04 ASSESSMENT — PAIN DESCRIPTION - PAIN TYPE: TYPE: SURGICAL PAIN

## 2022-11-04 ASSESSMENT — PAIN DESCRIPTION - FREQUENCY: FREQUENCY: CONTINUOUS

## 2022-11-04 ASSESSMENT — PAIN DESCRIPTION - DESCRIPTORS
DESCRIPTORS: DISCOMFORT;PRESSURE
DESCRIPTORS: ACHING;SORE

## 2022-11-04 ASSESSMENT — PAIN DESCRIPTION - LOCATION: LOCATION: FACE

## 2022-11-04 ASSESSMENT — PAIN - FUNCTIONAL ASSESSMENT
PAIN_FUNCTIONAL_ASSESSMENT: 0-10
PAIN_FUNCTIONAL_ASSESSMENT: ACTIVITIES ARE NOT PREVENTED

## 2022-11-04 NOTE — H&P
H&P reviewed, no changes. No issues. Questions and concerns were answered to the patient's satisfaction.  Will proceed with procedure    Will discuss with attending     Electronically signed by Luis Antonio Hernandez DO on 11/4/22 at 9:45 AM EDT

## 2022-11-04 NOTE — ANESTHESIA PRE PROCEDURE
MD        ceFAZolin (ANCEF) 2,000 mg in sterile water 20 mL IV syringe  2,000 mg IntraVENous On Call to [facility] [address] MD           Allergies:  No Known Allergies    Problem List:    Patient Active Problem List   Diagnosis Code    Hypertension I10    Heart valve disease I38    Hyperlipidemia E78.5       Past Medical History:        Diagnosis Date    Chronic sinusitis     disease - for surgical encounter 7/18/14    Diabetes mellitus ([address]     dx 10/13; type II    Environmental allergies     Heart murmur     Hyperlipidemia     Hypertension     stable per patient    Sleep apnea     uses c pap       Past Surgical History:        Procedure Laterality Date    COLONOSCOPY  10/13/11    COLONOSCOPY  11/09/2016    DR BARRAZA    ECHO COMPL W DOP COLOR FLOW  2/27/2013         NASAL SINUS SURGERY      times 4    SINUS SURGERY Bilateral 7/18/2014    TONSILLECTOMY         Social History:    Social History     Tobacco Use    Smoking status: Never    Smokeless tobacco: Never   Substance Use Topics    Alcohol use: No                                Counseling given: Not Answered      Vital Signs (Current):   Vitals:    11/01/22 1431 11/04/22 0832   BP:  (!) 141/71   Pulse:  73   Resp:  18   Temp:  36.6 °C (97.9 °F)   SpO2:  96%   Weight: 235 lb (106.6 kg)    Height: 6' (1.829 m)                                               BP Readings from Last 3 Encounters:   11/04/22 (!) 141/71   02/17/21 (!) 148/70   11/09/16 134/71       NPO Status: Time of last liquid consumption: 1830                        Time of last solid consumption: 1830                        Date of last liquid consumption: 11/03/22                        Date of last solid food consumption: 11/03/22    BMI:   Wt Readings from Last 3 Encounters:   11/01/22 235 lb (106.6 kg)   02/17/21 245 lb (111.1 kg)   11/09/16 250 lb (113.4 kg)     Body mass index is 31.87 kg/m².     CBC:   Lab Results   Component Value Date/Time    WBC 8.6 11/04/2022 08:22 AM    RBC 4.54 11/04/2022 08:22 AM    HGB 14.4 11/04/2022 08:22 AM    HCT 42.7 11/04/2022 08:22 AM    MCV 94.1 11/04/2022 08:22 AM    RDW 12.8 11/04/2022 08:22 AM     11/04/2022 08:22 AM       CMP:   Lab Results   Component Value Date/Time     11/04/2022 08:22 AM    K 4.0 11/04/2022 08:22 AM     11/04/2022 08:22 AM    CO2 23 11/04/2022 08:22 AM    BUN 23 11/04/2022 08:22 AM    CREATININE 1.1 11/04/2022 08:22 AM    GFRAA >60 10/07/2022 07:24 AM    LABGLOM >60 11/04/2022 08:22 AM    GLUCOSE 120 11/04/2022 08:22 AM    GLUCOSE 123 05/26/2012 07:55 AM    PROT 7.3 10/07/2022 07:24 AM    CALCIUM 10.1 11/04/2022 08:22 AM    BILITOT 0.5 10/07/2022 07:24 AM    ALKPHOS 39 10/07/2022 07:24 AM    AST 27 10/07/2022 07:24 AM    ALT 29 10/07/2022 07:24 AM       POC Tests: No results for input(s): POCGLU, POCNA, POCK, POCCL, POCBUN, POCHEMO, POCHCT in the last 72 hours.     Coags: No results found for: PROTIME, INR, APTT    HCG (If Applicable): No results found for: PREGTESTUR, PREGSERUM, HCG, HCGQUANT     ABGs: No results found for: PHART, PO2ART, NLF2ZUU, YDH9JJZ, BEART, O6DIKFGY     Type & Screen (If Applicable):  No results found for: LABABO, LABRH    Drug/Infectious Status (If Applicable):  No results found for: HIV, HEPCAB    COVID-19 Screening (If Applicable): No results found for: COVID19        Anesthesia Evaluation  Patient summary reviewed and Nursing notes reviewed no history of anesthetic complications:   Airway: Mallampati: III  TM distance: >3 FB   Neck ROM: full  Mouth opening: > = 3 FB   Dental: normal exam         Pulmonary: breath sounds clear to auscultation  (+) sleep apnea: on CPAP,      (-) not a current smoker                           Cardiovascular:  Exercise tolerance: good (>4 METS),   (+) hypertension:, valvular problems/murmurs:, pulmonary hypertension ( RVSP 36):,       ECG reviewed  Rhythm: regular  Rate: normal  Echocardiogram reviewed         Beta Blocker:  Not on Beta Blocker         Neuro/Psych:   Negative Neuro/Psych ROS              GI/Hepatic/Renal:             Endo/Other:    (+) DiabetesType II DM, , .                 Abdominal:       Abdomen: soft. Vascular: negative vascular ROS. Other Findings:      Imaging:    EKG      TTE 2/2013  Left ventricle size is normal.    Severe left ventricular concentric hypertrophy. Ejection fraction is visually estimated at 65-70%. Normal right ventricular function. There is doppler evidence of stage II diastolic dysfunction. The left atrium is mildly dilated. Mild mitral regurgitation. Mild aortic regurgitation. Mild aortic stenosis. Mild tricuspid regurgitation. PASP is estimated at 36 mmHg. Borderline dilated aortic root. The inferior vena cava is normal in size with normal respiratory    variation. No evidence of a hemodynamically significant pericardial effusion. Anesthesia Plan      general     ASA 3       Induction: rapid sequence. Anesthetic plan and risks discussed with patient. Use of blood products discussed with patient whom consented to blood products. Blood Products Consent Comment: Pt consented to blood products in all forms                    Alee Ocampo RN   11/4/2022        Pt seen, examined, chart reviewed, plan discussed.   Kalie Harper MD  11/4/2022  10:35 AM

## 2022-11-04 NOTE — PROGRESS NOTES
Prescriptions sent to [facility] on [address], perfect serve Mikey Ibarra, will send to [facility] per patient's request.

## 2022-11-04 NOTE — ANESTHESIA POSTPROCEDURE EVALUATION
Department of Anesthesiology  Postprocedure Note    Patient: Berenice Vitale  MRN: [id number]  YOB: 1960  Date of evaluation: 11/4/2022      Procedure Summary     Date: 11/04/22 Room / Location: [facility] OR 01 / [facility]    Anesthesia Start: 1020 Anesthesia Stop:     Procedure: LEFT ENDOSCOPIC MEDIAL ANTROSTOMY RIGHT MAXILLARY SINUSOTOMY WITH REMOVAL OF SOFT TISSUE BILATERALLY (Bilateral: Nose) Diagnosis:       Chronic pansinusitis      Nasal obstruction      (Chronic pansinusitis [J32.4])      (Nasal obstruction [J34.89])    Surgeons: Cheri Mejia MD Responsible Provider: Behzad Granger MD    Anesthesia Type: general ASA Status: 3          Anesthesia Type: No value filed.     Darrel Phase I: Darrel Score: 10    Darrel Phase II:        Anesthesia Post Evaluation    Patient location during evaluation: PACU  Patient participation: complete - patient participated  Level of consciousness: awake  Pain score: 0  Airway patency: patent  Nausea & Vomiting: no nausea and no vomiting  Complications: no  Cardiovascular status: hemodynamically stable  Respiratory status: acceptable  Hydration status: euvolemic

## 2022-11-04 NOTE — DISCHARGE INSTRUCTIONS
Follow up with Dr. Craig Level and cover when sneezing, coughing  No nose blowing for 2 weeks  Nasal saline spray in each nostril 4-5 times a day  Take medicines as directed  Ice to back of neck for comfort  Sleep with head elevated 30 degrees for the next few days      Endoscopic Sinus Surgery: What to Expect at [address] will have a drip pad under your nose to collect mucus and blood. Change it only when it bleeds through. You may have to do this every hour for 24 hours after surgery. You may have some swelling of your nose, upper lip, cheeks, or around your eyes. Your nose may be sore and will bleed. You may feel \"stuffed up\" like you have a bad head cold. This will last for several days after surgery. The tip of your nose and your upper lip and gums may be numb. Feeling will return in a few weeks to a few months. Your sense of smell will not be as good after surgery. It will improve and probably return to normal in 1 to 2 months. You will probably be able to return to work or school in about 1 week and to your normal routine in about 3 weeks. However, this varies with your job and the extent of your surgery. Most people feel normal in 1 to 2 months. You will have to visit your doctor regularly for 3 to 4 months after your surgery. Your doctor will check to see that your sinuses are healing well. This care sheet gives you a general idea about how long it will take for you to recover. But each person recovers at a different pace. Follow the steps below to get better as quickly as possible. How can you care for yourself at home? Activity  Rest when you feel tired. Getting enough sleep will help you recover. Do not lie flat. Raise your head with three or four pillows. This can reduce swelling. Try to sleep on your back during the month after surgery. You can also sleep in a reclining chair. Try to walk each day. Start by walking a little more than you did the day before.  Bit by bit, increase the amount you walk. Walking boosts blood flow and helps prevent pneumonia and constipation. Also, try to sit and stand as much as you can. For 1 week, try not to bend over or lift anything heavier than 10 pounds. This may include a child, heavy grocery bags and milk containers, a heavy briefcase or backpack, cat litter or dog food bags, or a vacuum . You can take a shower or bath. Use lukewarm, not hot water. Avoid swimming for 6 weeks. Avoid sawdust, chemicals, and excessive dust for 4 weeks. Avoid strenuous activities, such as biking, jogging, weight lifting, or aerobic exercise, for 1 week and then ease back into these activities over 2 to 3 weeks. You may drive when you are no longer taking prescription pain medicine and feel up to it. You will probably be able to return to work or school in about 1 week and to your normal routine in about 3 weeks. However, this varies with your job and the extent of your surgery. Diet  You can eat your normal diet. If your stomach is upset, try bland, low-fat foods like plain rice, broiled chicken, toast, and yogurt. You may notice that your bowel movements are not regular right after your surgery. This is common. Try to avoid constipation and straining with bowel movements. You may want to take a fiber supplement every day. If you have not had a bowel movement after a couple of days, ask your doctor about taking a mild laxative. Medicines  Do not take aspirin, aspirin-containing medicines, or anti-inflammatory medicines such as ibuprofen (Advil, Motrin) or naproxen (Aleve) for 3 weeks following surgery unless your doctor says it is okay. Take pain medicines exactly as directed. If the doctor gave you a prescription medicine for pain, take it as prescribed. Do not take two or more pain medicines at the same time unless the doctor told you to. Many pain medicines have acetaminophen, which is Tylenol.  Too much acetaminophen (Tylenol) can be harmful. If your doctor prescribed antibiotics, take them as directed. Do not stop taking them just because you feel better. You need to take the full course of antibiotics. If you think your pain medicine is making you sick to your stomach: Take your medicine after meals (unless your doctor has told you not to). Ask your doctor for a different pain medicine. Incision care  You probably will not have an incision (cut). You will have a drip pad under your nose to collect blood. Change it only when it has bled through. You may have to do this every hour for 24 hours after surgery. When bleeding stops, you can remove it. If you have packing in your nose, leave it in. Your doctor will take it out. Ice and elevation  To help with swelling and pain, put ice or a cold pack on your nose for 10 to 20 minutes at a time. Put a thin cloth between the ice and your skin. Do not sleep flat. Sleep with your head raised up. You can also sleep in a reclining chair. Other instructions  Do not blow your nose for 2 weeks. Do not put anything into your nose. If you must sneeze, open your mouth and sneeze naturally. Keep your mouth clean. Rinse your mouth with salt water or an alcohol-free mouthwash after each meal and before bedtime. After any packing is removed, use saline (saltwater) nasal washes to help keep your nasal passages open and wash out mucus and bacteria. You can buy saline nose drops at a grocery store or drugstore. You can wear your glasses when you wish. Do not wear contacts until the day after the surgery. Do not travel by airplane for at least 2 weeks. Your sinuses are still healing, and the changes in air pressure can affect them. Follow-up care is a key part of your treatment and safety. Be sure to make and go to all appointments, and call your doctor if you are having problems. It's also a good idea to know your test results and keep a list of the medicines you take.   When should you call for help?  Call 911 anytime you think you may need emergency care. For example, call if:  You passed out (lost consciousness). You have severe trouble breathing. Call your doctor now or seek immediate medical care if:  The dressing soaks through with blood and needs to be changed more than every 15 minutes. You have a fever with a stiff neck or a severe headache. You are sensitive to light, or you feel very sleepy or confused. You have pain that does not get better after you take pain medicine. You have a fever over 100°F.  You have double or blurred vision, cannot close your eyes, or have eye pain. Watch closely for changes in your health, and be sure to contact your doctor if:  You do not have a bowel movement after taking a laxative. Where can you learn more? Go to [email or web address] and sign in to your [employer] account. Enter S802 in the [employer] box to learn more about Endoscopic Sinus Surgery: What to Expect at Home.     If you do not have an account, please click on the Sign Up Now link. © 1671-9296 Healthwise, Incorporated. Care instructions adapted under license by [facility]. This care instruction is for use with your licensed healthcare professional. If you have questions about a medical condition or this instruction, always ask your healthcare professional. [address] any warranty or liability for your use of this information.   Content Version: 01.7.016032; Current as of: October 29, 2013

## 2022-11-04 NOTE — BRIEF OP NOTE
Brief Postoperative Note      Patient: Berenice Vitale  YOB: 1960  MRN: [id number]    Date of Procedure: 11/4/2022    Pre-Op Diagnosis: Chronic pansinusitis [J32.4]  Nasal obstruction [J34.89]    Post-Op Diagnosis: Same       Procedure(s):  LEFT REVISION ENDOSCOPIC ETHMOIDECTOMY WITH REMOVAL OF SOFT TISSUE   RIGHT REVISION ENDOSCOPIC MAXILLARY ANTROSTOMY WITH REMOVAL OF SOFT TISSUE   LEFT REVISION ENDOSCOPIC MAXILLARY ANTROSTOMY WITH REMOVAL OF SOFT TISSUE     Surgeon(s):  Cheri Mejia MD    Assistant:  Resident: Kait Telles DO    Anesthesia: General    Estimated Blood Loss (mL): 27DZ    Complications: None    Specimens:   ID Type Source Tests Collected by Time Destination   A : left nasal contents Tissue Tissue SURGICAL PATHOLOGY Cheri Mejia MD 11/4/2022 1118        Implants:  Implant Name Type Inv. Item Serial No.  Lot No. LRB No. Used Action   STENT NSL L16MM DIA4MM 370UG MINI MOMETASONE FUROATE LO - [id number]  STENT NSL L16MM DIA4MM 370UG MINI MOMETASONE FUROATE LO  [employer]- [id number] Right 1 Implanted   STENT NSL L16MM DIA4MM 370UG MINI MOMETASONE FUROATE LO - [id number]  STENT NSL L16MM DIA4MM 370UG MINI MOMETASONE FUROATE LO  [employer]- [id number] Left 1 Implanted         Drains: * No LDAs found *    Findings: polypoid tissue obstructing left ethmoid and maxillary os.  Right synchea from right middle turbinate to right maxillary os, significant post surgical changes throughout               Electronically signed by Kait Telles DO on 11/4/2022 at 11:30 AM

## 2022-11-04 NOTE — ANESTHESIA POSTPROCEDURE EVALUATION
Department of Anesthesiology  Postprocedure Note    Patient: Diana Bar  MRN: [id number]  YOB: 1960  Date of evaluation: 11/4/2022      Procedure Summary     Date: 11/04/22 Room / Location: [facility] OR 01 / [facility]    Anesthesia Start:  Anesthesia Stop:     Procedure: LEFT ENDOSCOPIC ETHMOIDECTOMY LEFT ENDOSCOPIC MEDIAL ANTROSTOMY RIGHT MAXILLARY SINUSOTOMY WITH REMOVAL OF SOFT TISSUE POSSIBLE BALLOON SINUPLASTY (Bilateral: Nose) Diagnosis:       Chronic pansinusitis      Nasal obstruction      (Chronic pansinusitis [J32.4])      (Nasal obstruction [J34.89])    Surgeons: Lorenza Easley MD Responsible Provider:     Anesthesia Type: Not recorded ASA Status: Not recorded          Anesthesia Type: No value filed.     Darrel Phase I: Darrel Score: 10    Darrel Phase II:        Anesthesia Post Evaluation    Patient location during evaluation: PACU  Patient participation: complete - patient participated  Level of consciousness: awake  Pain score: 3  Airway patency: patent  Nausea & Vomiting: no nausea and no vomiting  Complications: no  Cardiovascular status: blood pressure returned to baseline  Respiratory status: acceptable  Hydration status: euvolemic

## 2022-11-05 NOTE — OP NOTE
[address] [address]                                OPERATIVE REPORT    PATIENT NAME: Lenora Guerrero                   :        1960  MED REC NO:   [id number]                            ROOM:  ACCOUNT NO:   [de-identified]                           ADMIT DATE: 2022  PROVIDER:     Nabil Sultana DO    DATE OF PROCEDURE:  2022    PREOPERATIVE DIAGNOSIS:  Chronic sinusitis. POSTOPERATIVE DIAGNOSIS:  Chronic sinusitis. PROCEDURE PERFORMED:    LEFT REVISION ENDOSCOPIC ETHMOIDECTOMY WITH REMOVAL OF SOFT TISSUE   RIGHT REVISION ENDOSCOPIC MAXILLARY ANTROSTOMY WITH REMOVAL OF SOFT TISSUE   LEFT REVISION ENDOSCOPIC MAXILLARY ANTROSTOMY WITH REMOVAL OF SOFT TISSUE     SURGEON:  Yury Ha MD.    ASSISTANT:  Nabil Sultana DO, PGY-4. ANESTHESIA:  General.    FLUIDS:  IV crystalloid. ESTIMATED BLOOD LOSS:  20 mL. FINDINGS:  Mucosal polyps obstructing the left ostiomeatal complex and  into the ethmoid cavity, right synechiae banding from middle turbinate  to lateral nasal wall, mucosal polypoid changes around the right  maxillary os. Septum straight. Postsurgical changes throughout the  sinus cavity. COMPLICATIONS:  None. SPECIMEN:  Left nasal contents. INDICATIONS FOR PROCEDURE:  This is a 27-year-old male with history of  multiple sinus surgeries for chronic sinusitis with polyps who presents  with worsening of nasal obstructive symptoms as well as congestion and  pressure. Risks, benefits, and alternatives were discussed including,  but not limited to bleeding, infection, damage to adjacent structures,  and need for further procedure. The patient understood and agreed to  proceed with the procedure. DESCRIPTION OF PROCEDURE:  The patient was brought back to the operating  room and placed supine on the table. SCDs were placed and functioning.    The patient was then handed to Anesthesia for proper endotracheal  intubation. The head of the bed was then turned 90 degrees  counterclockwise from the anesthesia unit. The nose was then cleansed  with Betadine swab. A 1% lidocaine with 1:100,000 epinephrine was then  injected in bilateral nasal cavities along the nasal septum, inferior  turbinate, and middle turbinate remnant. A 4% cocaine-soaked pledgets  were then placed in bilateral nasal cavities and allowed to work. The  patient was then prepped and draped in a sterile fashion. Using a  0-degree endoscope, the left side of the nasal cavity was first  evaluated. The left nasal cavity was noted to have extensive crusting  throughout. This was irrigated with normal saline and suctioned clear. Once the nasal cavity was cleared free of crusting and mucus, there were  postsurgical changes noted throughout the left nasal cavity including a  very small remnant of the middle turbinate and surgical changes to the  inferior turbinate. A large maxillary antrostomy was already noted as  well as a large ethmoid cavity. There were polypoid changes filling the  left ethmoid cavity obstructing the left ostiomeatal complex. These  were removed with an upcut forceps manually. There were some polypoid  changes noted around the left maxillary os. These were taken removed using  a microdebrider. Once all the polypoid changes were removed, the  ethmoid cavity and the maxillary sinus cavity, entire nasal cavity, frontal sinus os, were inspected with a 30  and then a 70-degree endoscope. No mucosal disease or polyps were noted  in either cavities. The nasopharynx was then evaluated and noted also  to be clear of any disease. Attention was then turned to the right  nasal cavity. There were synechiae noted to be banding from the remnant  right middle turbinate to lateral nasal wall. This was lysed sharply. There were polypoid changes noted around the right maxillary os.   These  were removed using a microdebrider. Using a 30-degree and then a  70-degree endoscope, the right nasal cavity, frontal sinus os, the right maxillary sinus cavity as well as the  right ethmoid cavity were inspected and noted to be free of mucosal  disease or polyp. Minimal Nasal bleeding was seen in bilateral nasal cavities  and this was cauterized with suction Bovie. Propel mini stents were  then placed in the right ethmoid cavity as well as the left ethmoid  cavity and noted to medialize the middle turbinate. Sinu-Foam was then  placed further medializing the middle turbinate. The patient tolerated  the procedure without complication. The patient was handed back to  Anesthesia for proper awakening. Dr. Ciltaly Castillo was present throughout for  the entire case.         Ernie Mesa DO    D: 11/04/2022 15:24:52       T: 11/05/2022 1:46:38     [id number]  Job#: [id number]     Doc#: [id number]    CC:

## 2022-11-07 LAB
EKG ATRIAL RATE: 77 BPM
EKG P AXIS: 3 DEGREES
EKG P-R INTERVAL: 216 MS
EKG Q-T INTERVAL: 404 MS
EKG QRS DURATION: 108 MS
EKG QTC CALCULATION (BAZETT): 457 MS
EKG T AXIS: 104 DEGREES
EKG VENTRICULAR RATE: 77 BPM

## 2023-05-26 ENCOUNTER — HOSPITAL ENCOUNTER (OUTPATIENT)
Age: 63
Discharge: HOME | End: 2023-05-26
Payer: COMMERCIAL

## 2023-05-26 LAB
ALBUMIN SERPL-MCNC: 4.6 G/DL (ref 3.5–5.2)
ALP SERPL-CCNC: 39 U/L (ref 40–129)
ALT SERPL-CCNC: 28 U/L (ref 0–40)
ANION GAP SERPL CALCULATED.3IONS-SCNC: 14 MMOL/L (ref 7–16)
AST SERPL-CCNC: 27 U/L (ref 0–39)
BASOPHILS # BLD: 0.1 E9/L (ref 0–0.2)
BASOPHILS NFR BLD: 1.2 % (ref 0–2)
BILIRUB SERPL-MCNC: 0.8 MG/DL (ref 0–1.2)
BUN SERPL-MCNC: 27 MG/DL (ref 6–23)
CALCIUM SERPL-MCNC: 9.9 MG/DL (ref 8.6–10.2)
CHLORIDE SERPL-SCNC: 102 MMOL/L (ref 98–107)
CHOLESTEROL, TOTAL: 144 MG/DL (ref 0–199)
CO2 SERPL-SCNC: 24 MMOL/L (ref 22–29)
CREAT SERPL-MCNC: 1.3 MG/DL (ref 0.7–1.2)
CREAT UR-MCNC: 140 MG/DL (ref 40–278)
EOSINOPHIL # BLD: 0.46 E9/L (ref 0.05–0.5)
EOSINOPHIL NFR BLD: 5.4 % (ref 0–6)
ERYTHROCYTE [DISTWIDTH] IN BLOOD BY AUTOMATED COUNT: 13.1 FL (ref 11.5–15)
GLUCOSE SERPL-MCNC: 121 MG/DL (ref 74–99)
HBA1C MFR BLD: 6.3 % (ref 4–5.6)
HCT VFR BLD AUTO: 42.9 % (ref 37–54)
HDLC SERPL-MCNC: 36 MG/DL
HGB BLD-MCNC: 14.3 G/DL (ref 12.5–16.5)
IMM GRANULOCYTES # BLD: 0.07 E9/L
IMM GRANULOCYTES NFR BLD: 0.8 % (ref 0–5)
LDLC SERPL CALC-MCNC: 71 MG/DL (ref 0–99)
LYMPHOCYTES # BLD: 3.01 E9/L (ref 1.5–4)
LYMPHOCYTES NFR BLD: 35.6 % (ref 20–42)
MCH RBC QN AUTO: 31.1 PG (ref 26–35)
MCHC RBC AUTO-ENTMCNC: 33.3 % (ref 32–34.5)
MCV RBC AUTO: 93.3 FL (ref 80–99.9)
MICROALBUMIN UR-MCNC: <12 MG/L
MICROALBUMIN/CREAT UR-RTO: ABNORMAL (ref 0–30)
MONOCYTES # BLD: 0.78 E9/L (ref 0.1–0.95)
MONOCYTES NFR BLD: 9.2 % (ref 2–12)
NEUTROPHILS # BLD: 4.03 E9/L (ref 1.8–7.3)
NEUTS SEG NFR BLD: 47.8 % (ref 43–80)
PLATELET # BLD AUTO: 323 E9/L (ref 130–450)
PMV BLD AUTO: 8.2 FL (ref 7–12)
POTASSIUM SERPL-SCNC: 3.9 MMOL/L (ref 3.5–5)
PROT SERPL-MCNC: 7.3 G/DL (ref 6.4–8.3)
PSA SERPL-MCNC: 1.59 NG/ML (ref 0–4)
RBC # BLD AUTO: 4.6 E12/L (ref 3.8–5.8)
SODIUM SERPL-SCNC: 140 MMOL/L (ref 132–146)
TRIGL SERPL-MCNC: 183 MG/DL (ref 0–149)
VLDLC SERPL CALC-MCNC: 37 MG/DL
WBC # BLD: 8.5 E9/L (ref 4.5–11.5)

## 2023-05-26 PROCEDURE — 80053 COMPREHEN METABOLIC PANEL: CPT

## 2023-05-26 PROCEDURE — 82044 UR ALBUMIN SEMIQUANTITATIVE: CPT

## 2023-05-26 PROCEDURE — 80061 LIPID PANEL: CPT

## 2023-05-26 PROCEDURE — 85025 COMPLETE CBC W/AUTO DIFF WBC: CPT

## 2023-05-26 PROCEDURE — G0103 PSA SCREENING: HCPCS

## 2023-05-26 PROCEDURE — 36415 COLL VENOUS BLD VENIPUNCTURE: CPT

## 2023-05-26 PROCEDURE — 82570 ASSAY OF URINE CREATININE: CPT

## 2023-05-26 PROCEDURE — 83036 HEMOGLOBIN GLYCOSYLATED A1C: CPT

## 2023-12-02 ENCOUNTER — HOSPITAL ENCOUNTER (OUTPATIENT)
Age: 63
Discharge: HOME | End: 2023-12-02
Payer: COMMERCIAL

## 2023-12-02 LAB
ALBUMIN SERPL-MCNC: 4.7 G/DL (ref 3.5–5.2)
ALP SERPL-CCNC: 36 U/L (ref 40–129)
ALT SERPL-CCNC: 29 U/L (ref 0–40)
ANION GAP SERPL CALCULATED.3IONS-SCNC: 13 MMOL/L (ref 7–16)
AST SERPL-CCNC: 29 U/L (ref 0–39)
BILIRUB SERPL-MCNC: 0.7 MG/DL (ref 0–1.2)
BUN SERPL-MCNC: 27 MG/DL (ref 6–23)
CALCIUM SERPL-MCNC: 9.6 MG/DL (ref 8.6–10.2)
CHLORIDE SERPL-SCNC: 103 MMOL/L (ref 98–107)
CHOLEST SERPL-MCNC: 151 MG/DL
CO2 SERPL-SCNC: 25 MMOL/L (ref 22–29)
CREAT SERPL-MCNC: 1.2 MG/DL (ref 0.7–1.2)
GFR SERPL CREATININE-BSD FRML MDRD: >60 ML/MIN/1.73M2
GLUCOSE SERPL-MCNC: 135 MG/DL (ref 74–99)
HBA1C MFR BLD: 6.4 % (ref 4–5.6)
HDLC SERPL-MCNC: 37 MG/DL
LDLC SERPL CALC-MCNC: 76 MG/DL
POTASSIUM SERPL-SCNC: 3.9 MMOL/L (ref 3.5–5)
PROT SERPL-MCNC: 7.1 G/DL (ref 6.4–8.3)
SODIUM SERPL-SCNC: 141 MMOL/L (ref 132–146)
TRIGL SERPL-MCNC: 192 MG/DL
VLDLC SERPL CALC-MCNC: 38 MG/DL

## 2023-12-02 PROCEDURE — 80061 LIPID PANEL: CPT

## 2023-12-02 PROCEDURE — 83036 HEMOGLOBIN GLYCOSYLATED A1C: CPT

## 2023-12-02 PROCEDURE — 36415 COLL VENOUS BLD VENIPUNCTURE: CPT

## 2023-12-02 PROCEDURE — 80053 COMPREHEN METABOLIC PANEL: CPT

## 2023-12-26 ENCOUNTER — HOSPITAL ENCOUNTER (OUTPATIENT)
Age: 63
Discharge: HOME | End: 2023-12-26
Attending: FAMILY MEDICINE
Payer: COMMERCIAL

## 2023-12-26 ENCOUNTER — TELEPHONE (OUTPATIENT)
Dept: PRIMARY CARE CLINIC | Age: 63
End: 2023-12-26

## 2023-12-26 ENCOUNTER — APPOINTMENT (OUTPATIENT)
Dept: CT IMAGING | Age: 63
DRG: 682 | End: 2023-12-26
Attending: EMERGENCY MEDICINE
Payer: COMMERCIAL

## 2023-12-26 ENCOUNTER — OFFICE VISIT (OUTPATIENT)
Dept: FAMILY MEDICINE CLINIC | Age: 63
End: 2023-12-26
Payer: COMMERCIAL

## 2023-12-26 ENCOUNTER — HOSPITAL ENCOUNTER (INPATIENT)
Age: 63
LOS: 5 days | Discharge: HOME | DRG: 682 | End: 2023-12-31
Attending: EMERGENCY MEDICINE | Admitting: STUDENT IN AN ORGANIZED HEALTH CARE EDUCATION/TRAINING PROGRAM
Payer: COMMERCIAL

## 2023-12-26 ENCOUNTER — HOSPITAL ENCOUNTER (OUTPATIENT)
Dept: CT IMAGING | Age: 63
Discharge: HOME | End: 2023-12-28
Attending: FAMILY MEDICINE
Payer: COMMERCIAL

## 2023-12-26 VITALS
TEMPERATURE: 97.6 F | HEART RATE: 76 BPM | OXYGEN SATURATION: 94 % | HEIGHT: 72 IN | BODY MASS INDEX: 34.13 KG/M2 | SYSTOLIC BLOOD PRESSURE: 130 MMHG | DIASTOLIC BLOOD PRESSURE: 80 MMHG | WEIGHT: 252 LBS

## 2023-12-26 DIAGNOSIS — I48.91 ATRIAL FIBRILLATION, UNSPECIFIED TYPE (HCC): ICD-10-CM

## 2023-12-26 DIAGNOSIS — B96.89 SINUSITIS, BACTERIAL: ICD-10-CM

## 2023-12-26 DIAGNOSIS — R06.6 HICCUPS: ICD-10-CM

## 2023-12-26 DIAGNOSIS — R51.9 NONINTRACTABLE HEADACHE, UNSPECIFIED CHRONICITY PATTERN, UNSPECIFIED HEADACHE TYPE: ICD-10-CM

## 2023-12-26 DIAGNOSIS — R05.9 COUGH, UNSPECIFIED TYPE: Primary | ICD-10-CM

## 2023-12-26 DIAGNOSIS — B34.9 VIRAL INFECTION: ICD-10-CM

## 2023-12-26 DIAGNOSIS — J32.9 SINUSITIS, BACTERIAL: ICD-10-CM

## 2023-12-26 DIAGNOSIS — R05.9 COUGH, UNSPECIFIED TYPE: ICD-10-CM

## 2023-12-26 DIAGNOSIS — E87.1 HYPONATREMIA: ICD-10-CM

## 2023-12-26 DIAGNOSIS — N17.9 AKI (ACUTE KIDNEY INJURY) (HCC): Primary | ICD-10-CM

## 2023-12-26 LAB
ALBUMIN SERPL-MCNC: 3.7 G/DL (ref 3.5–5.2)
ALBUMIN SERPL-MCNC: 3.7 G/DL (ref 3.5–5.2)
ALP SERPL-CCNC: 225 U/L (ref 40–129)
ALP SERPL-CCNC: 240 U/L (ref 40–129)
ALT SERPL-CCNC: 106 U/L (ref 0–40)
ALT SERPL-CCNC: 108 U/L (ref 0–40)
ANION GAP SERPL CALCULATED.3IONS-SCNC: 25 MMOL/L (ref 7–16)
ANION GAP SERPL CALCULATED.3IONS-SCNC: 28 MMOL/L (ref 7–16)
AST SERPL-CCNC: 56 U/L (ref 0–39)
AST SERPL-CCNC: 63 U/L (ref 0–39)
B PARAP IS1001 DNA NPH QL NAA+NON-PROBE: NOT DETECTED
B PERT DNA SPEC QL NAA+PROBE: NOT DETECTED
BACTERIA URNS QL MICRO: ABNORMAL
BASOPHILS # BLD: 0.01 K/UL (ref 0–0.2)
BASOPHILS # BLD: 0.01 K/UL (ref 0–0.2)
BASOPHILS NFR BLD: 0 % (ref 0–2)
BASOPHILS NFR BLD: 0 % (ref 0–2)
BILIRUB SERPL-MCNC: 0.3 MG/DL (ref 0–1.2)
BILIRUB SERPL-MCNC: 0.3 MG/DL (ref 0–1.2)
BILIRUB UR QL STRIP: ABNORMAL
BUN SERPL-MCNC: 126 MG/DL (ref 6–23)
BUN SERPL-MCNC: 143 MG/DL (ref 6–23)
C PNEUM DNA NPH QL NAA+NON-PROBE: NOT DETECTED
CALCIUM SERPL-MCNC: 7.5 MG/DL (ref 8.6–10.2)
CALCIUM SERPL-MCNC: 7.7 MG/DL (ref 8.6–10.2)
CHLORIDE SERPL-SCNC: 81 MMOL/L (ref 98–107)
CHLORIDE SERPL-SCNC: 82 MMOL/L (ref 98–107)
CLARITY UR: CLEAR
CO2 SERPL-SCNC: 12 MMOL/L (ref 22–29)
CO2 SERPL-SCNC: 14 MMOL/L (ref 22–29)
COLOR UR: YELLOW
CREAT SERPL-MCNC: 8.1 MG/DL (ref 0.7–1.2)
CREAT SERPL-MCNC: 8.5 MG/DL (ref 0.7–1.2)
EOSINOPHIL # BLD: 0.2 K/UL (ref 0.05–0.5)
EOSINOPHIL # BLD: 0.21 K/UL (ref 0.05–0.5)
EOSINOPHILS RELATIVE PERCENT: 2 % (ref 0–6)
EOSINOPHILS RELATIVE PERCENT: 2 % (ref 0–6)
ERYTHROCYTE [DISTWIDTH] IN BLOOD BY AUTOMATED COUNT: 14.4 % (ref 11.5–15)
ERYTHROCYTE [DISTWIDTH] IN BLOOD BY AUTOMATED COUNT: 14.4 % (ref 11.5–15)
FLUAV RNA NPH QL NAA+NON-PROBE: NOT DETECTED
FLUBV RNA NPH QL NAA+NON-PROBE: NOT DETECTED
GFR SERPL CREATININE-BSD FRML MDRD: 6 ML/MIN/1.73M2
GFR SERPL CREATININE-BSD FRML MDRD: 7 ML/MIN/1.73M2
GLUCOSE SERPL-MCNC: 107 MG/DL (ref 74–99)
GLUCOSE SERPL-MCNC: 119 MG/DL (ref 74–99)
GLUCOSE UR STRIP-MCNC: NEGATIVE MG/DL
HADV DNA NPH QL NAA+NON-PROBE: NOT DETECTED
HCOV 229E RNA NPH QL NAA+NON-PROBE: NOT DETECTED
HCOV HKU1 RNA NPH QL NAA+NON-PROBE: NOT DETECTED
HCOV NL63 RNA NPH QL NAA+NON-PROBE: NOT DETECTED
HCOV OC43 RNA NPH QL NAA+NON-PROBE: DETECTED
HCT VFR BLD AUTO: 34.2 % (ref 37–54)
HCT VFR BLD AUTO: 37 % (ref 37–54)
HGB BLD-MCNC: 11.9 G/DL (ref 12.5–16.5)
HGB BLD-MCNC: 12.8 G/DL (ref 12.5–16.5)
HGB UR QL STRIP.AUTO: ABNORMAL
HMPV RNA NPH QL NAA+NON-PROBE: NOT DETECTED
HPIV1 RNA NPH QL NAA+NON-PROBE: NOT DETECTED
HPIV2 RNA NPH QL NAA+NON-PROBE: NOT DETECTED
HPIV3 RNA NPH QL NAA+NON-PROBE: NOT DETECTED
HPIV4 RNA NPH QL NAA+NON-PROBE: NOT DETECTED
IMM GRANULOCYTES # BLD AUTO: 0.16 K/UL (ref 0–0.58)
IMM GRANULOCYTES # BLD AUTO: 0.2 K/UL (ref 0–0.58)
IMM GRANULOCYTES NFR BLD: 1 % (ref 0–5)
IMM GRANULOCYTES NFR BLD: 2 % (ref 0–5)
INFLUENZA A ANTIBODY: NEGATIVE
INFLUENZA B ANTIBODY: NEGATIVE
KETONES UR STRIP-MCNC: NEGATIVE MG/DL
LACTATE BLDV-SCNC: 1.4 MMOL/L (ref 0.5–1.9)
LEUKOCYTE ESTERASE UR QL STRIP: ABNORMAL
LYMPHOCYTES NFR BLD: 0.72 K/UL (ref 1.5–4)
LYMPHOCYTES NFR BLD: 0.97 K/UL (ref 1.5–4)
LYMPHOCYTES RELATIVE PERCENT: 6 % (ref 20–42)
LYMPHOCYTES RELATIVE PERCENT: 8 % (ref 20–42)
Lab: NORMAL
M PNEUMO DNA NPH QL NAA+NON-PROBE: NOT DETECTED
MCH RBC QN AUTO: 30.5 PG (ref 26–35)
MCH RBC QN AUTO: 30.9 PG (ref 26–35)
MCHC RBC AUTO-ENTMCNC: 34.6 G/DL (ref 32–34.5)
MCHC RBC AUTO-ENTMCNC: 34.8 G/DL (ref 32–34.5)
MCV RBC AUTO: 88.1 FL (ref 80–99.9)
MCV RBC AUTO: 88.8 FL (ref 80–99.9)
MONOCYTES NFR BLD: 0.33 K/UL (ref 0.1–0.95)
MONOCYTES NFR BLD: 0.44 K/UL (ref 0.1–0.95)
MONOCYTES NFR BLD: 3 % (ref 2–12)
MONOCYTES NFR BLD: 4 % (ref 2–12)
NEUTROPHILS NFR BLD: 85 % (ref 43–80)
NEUTROPHILS NFR BLD: 87 % (ref 43–80)
NEUTS SEG NFR BLD: 10.37 K/UL (ref 1.8–7.3)
NEUTS SEG NFR BLD: 9.84 K/UL (ref 1.8–7.3)
NITRITE UR QL STRIP: NEGATIVE
PERFORMING INSTRUMENT: NORMAL
PH UR STRIP: 5.5 [PH] (ref 5–9)
PLATELET # BLD AUTO: 110 K/UL (ref 130–450)
PLATELET # BLD AUTO: 113 K/UL (ref 130–450)
PMV BLD AUTO: 10.1 FL (ref 7–12)
PMV BLD AUTO: 10.5 FL (ref 7–12)
POTASSIUM SERPL-SCNC: 4.3 MMOL/L (ref 3.5–5)
POTASSIUM SERPL-SCNC: 4.3 MMOL/L (ref 3.5–5)
PROT SERPL-MCNC: 6.9 G/DL (ref 6.4–8.3)
PROT SERPL-MCNC: 7 G/DL (ref 6.4–8.3)
PROT UR STRIP-MCNC: 100 MG/DL
QC PASS/FAIL: NORMAL
RBC # BLD AUTO: 3.85 M/UL (ref 3.8–5.8)
RBC # BLD AUTO: 4.2 M/UL (ref 3.8–5.8)
RBC #/AREA URNS HPF: ABNORMAL /HPF
RSV RNA NPH QL NAA+NON-PROBE: NOT DETECTED
RV+EV RNA NPH QL NAA+NON-PROBE: NOT DETECTED
SARS-COV-2 RNA NPH QL NAA+NON-PROBE: NOT DETECTED
SARS-COV-2, POC: NORMAL
SODIUM SERPL-SCNC: 121 MMOL/L (ref 132–146)
SODIUM SERPL-SCNC: 121 MMOL/L (ref 132–146)
SP GR UR STRIP: 1.02 (ref 1–1.03)
SPECIMEN DESCRIPTION: ABNORMAL
UROBILINOGEN UR STRIP-ACNC: 0.2 EU/DL (ref 0–1)
WBC #/AREA URNS HPF: ABNORMAL /HPF
WBC OTHER # BLD: 11.3 K/UL (ref 4.5–11.5)
WBC OTHER # BLD: 12.2 K/UL (ref 4.5–11.5)

## 2023-12-26 PROCEDURE — G8417 CALC BMI ABV UP PARAM F/U: HCPCS | Performed by: FAMILY MEDICINE

## 2023-12-26 PROCEDURE — 93005 ELECTROCARDIOGRAM TRACING: CPT | Performed by: PHYSICIAN ASSISTANT

## 2023-12-26 PROCEDURE — 2580000003 HC RX 258: Performed by: EMERGENCY MEDICINE

## 2023-12-26 PROCEDURE — 80053 COMPREHEN METABOLIC PANEL: CPT

## 2023-12-26 PROCEDURE — C9113 INJ PANTOPRAZOLE SODIUM, VIA: HCPCS | Performed by: EMERGENCY MEDICINE

## 2023-12-26 PROCEDURE — 96361 HYDRATE IV INFUSION ADD-ON: CPT

## 2023-12-26 PROCEDURE — 3075F SYST BP GE 130 - 139MM HG: CPT | Performed by: FAMILY MEDICINE

## 2023-12-26 PROCEDURE — 36415 COLL VENOUS BLD VENIPUNCTURE: CPT

## 2023-12-26 PROCEDURE — 96374 THER/PROPH/DIAG INJ IV PUSH: CPT

## 2023-12-26 PROCEDURE — 87426 SARSCOV CORONAVIRUS AG IA: CPT | Performed by: FAMILY MEDICINE

## 2023-12-26 PROCEDURE — 83605 ASSAY OF LACTIC ACID: CPT

## 2023-12-26 PROCEDURE — 99285 EMERGENCY DEPT VISIT HI MDM: CPT

## 2023-12-26 PROCEDURE — 74176 CT ABD & PELVIS W/O CONTRAST: CPT

## 2023-12-26 PROCEDURE — 3079F DIAST BP 80-89 MM HG: CPT | Performed by: FAMILY MEDICINE

## 2023-12-26 PROCEDURE — G8484 FLU IMMUNIZE NO ADMIN: HCPCS | Performed by: FAMILY MEDICINE

## 2023-12-26 PROCEDURE — 85025 COMPLETE CBC W/AUTO DIFF WBC: CPT

## 2023-12-26 PROCEDURE — 81001 URINALYSIS AUTO W/SCOPE: CPT

## 2023-12-26 PROCEDURE — 6360000002 HC RX W HCPCS: Performed by: EMERGENCY MEDICINE

## 2023-12-26 PROCEDURE — 0202U NFCT DS 22 TRGT SARS-COV-2: CPT

## 2023-12-26 PROCEDURE — 2060000000 HC ICU INTERMEDIATE R&B

## 2023-12-26 PROCEDURE — 87040 BLOOD CULTURE FOR BACTERIA: CPT

## 2023-12-26 PROCEDURE — G8427 DOCREV CUR MEDS BY ELIG CLIN: HCPCS | Performed by: FAMILY MEDICINE

## 2023-12-26 PROCEDURE — 70450 CT HEAD/BRAIN W/O DYE: CPT

## 2023-12-26 PROCEDURE — 87804 INFLUENZA ASSAY W/OPTIC: CPT | Performed by: FAMILY MEDICINE

## 2023-12-26 PROCEDURE — 3017F COLORECTAL CA SCREEN DOC REV: CPT | Performed by: FAMILY MEDICINE

## 2023-12-26 PROCEDURE — 99214 OFFICE O/P EST MOD 30 MIN: CPT | Performed by: FAMILY MEDICINE

## 2023-12-26 PROCEDURE — 96375 TX/PRO/DX INJ NEW DRUG ADDON: CPT

## 2023-12-26 PROCEDURE — 1036F TOBACCO NON-USER: CPT | Performed by: FAMILY MEDICINE

## 2023-12-26 RX ORDER — SENNOSIDES A AND B 8.6 MG/1
1 TABLET, FILM COATED ORAL DAILY PRN
Status: DISCONTINUED | OUTPATIENT
Start: 2023-12-26 | End: 2023-12-31 | Stop reason: HOSPADM

## 2023-12-26 RX ORDER — 0.9 % SODIUM CHLORIDE 0.9 %
1000 INTRAVENOUS SOLUTION INTRAVENOUS ONCE
Status: COMPLETED | OUTPATIENT
Start: 2023-12-26 | End: 2023-12-26

## 2023-12-26 RX ORDER — ACETAMINOPHEN 650 MG/1
650 SUPPOSITORY RECTAL EVERY 6 HOURS PRN
Status: DISCONTINUED | OUTPATIENT
Start: 2023-12-26 | End: 2023-12-31 | Stop reason: HOSPADM

## 2023-12-26 RX ORDER — DEXTROSE MONOHYDRATE 100 MG/ML
INJECTION, SOLUTION INTRAVENOUS CONTINUOUS PRN
Status: DISCONTINUED | OUTPATIENT
Start: 2023-12-26 | End: 2023-12-31 | Stop reason: HOSPADM

## 2023-12-26 RX ORDER — HEPARIN SODIUM 10000 [USP'U]/ML
5000 INJECTION, SOLUTION INTRAVENOUS; SUBCUTANEOUS EVERY 8 HOURS
Status: DISCONTINUED | OUTPATIENT
Start: 2023-12-26 | End: 2023-12-27 | Stop reason: SDUPTHER

## 2023-12-26 RX ORDER — PANTOPRAZOLE SODIUM 40 MG/10ML
40 INJECTION, POWDER, LYOPHILIZED, FOR SOLUTION INTRAVENOUS ONCE
Status: COMPLETED | OUTPATIENT
Start: 2023-12-26 | End: 2023-12-26

## 2023-12-26 RX ORDER — ROSUVASTATIN CALCIUM 10 MG/1
10 TABLET, COATED ORAL EVERY EVENING
Status: DISCONTINUED | OUTPATIENT
Start: 2023-12-26 | End: 2023-12-31 | Stop reason: HOSPADM

## 2023-12-26 RX ORDER — FENOFIBRATE 160 MG/1
160 TABLET ORAL DAILY
Status: DISCONTINUED | OUTPATIENT
Start: 2023-12-27 | End: 2023-12-31 | Stop reason: HOSPADM

## 2023-12-26 RX ORDER — INSULIN LISPRO 100 [IU]/ML
0-4 INJECTION, SOLUTION INTRAVENOUS; SUBCUTANEOUS NIGHTLY
Status: DISCONTINUED | OUTPATIENT
Start: 2023-12-26 | End: 2023-12-31 | Stop reason: HOSPADM

## 2023-12-26 RX ORDER — BACLOFEN 10 MG/1
10 TABLET ORAL 3 TIMES DAILY
Qty: 30 TABLET | Refills: 0 | Status: ON HOLD
Start: 2023-12-26 | End: 2023-12-31 | Stop reason: HOSPADM

## 2023-12-26 RX ORDER — ONDANSETRON 2 MG/ML
4 INJECTION INTRAMUSCULAR; INTRAVENOUS EVERY 6 HOURS PRN
Status: DISCONTINUED | OUTPATIENT
Start: 2023-12-26 | End: 2023-12-31 | Stop reason: HOSPADM

## 2023-12-26 RX ORDER — SODIUM CHLORIDE 0.9 % (FLUSH) 0.9 %
10 SYRINGE (ML) INJECTION PRN
Status: DISCONTINUED | OUTPATIENT
Start: 2023-12-26 | End: 2023-12-31 | Stop reason: HOSPADM

## 2023-12-26 RX ORDER — ACETAMINOPHEN 325 MG/1
650 TABLET ORAL EVERY 6 HOURS PRN
Status: DISCONTINUED | OUTPATIENT
Start: 2023-12-26 | End: 2023-12-31 | Stop reason: HOSPADM

## 2023-12-26 RX ORDER — BACLOFEN 10 MG/1
10 TABLET ORAL 3 TIMES DAILY
Status: DISCONTINUED | OUTPATIENT
Start: 2023-12-26 | End: 2023-12-31 | Stop reason: HOSPADM

## 2023-12-26 RX ORDER — SODIUM CHLORIDE 9 MG/ML
INJECTION, SOLUTION INTRAVENOUS PRN
Status: DISCONTINUED | OUTPATIENT
Start: 2023-12-26 | End: 2023-12-28 | Stop reason: SDUPTHER

## 2023-12-26 RX ORDER — LEVOFLOXACIN 500 MG/1
500 TABLET, FILM COATED ORAL DAILY
Qty: 10 TABLET | Refills: 0 | Status: ON HOLD
Start: 2023-12-26 | End: 2023-12-31 | Stop reason: HOSPADM

## 2023-12-26 RX ORDER — FLUTICASONE PROPIONATE 50 MCG
2 SPRAY, SUSPENSION (ML) NASAL DAILY
Status: DISCONTINUED | OUTPATIENT
Start: 2023-12-27 | End: 2023-12-31 | Stop reason: HOSPADM

## 2023-12-26 RX ORDER — INSULIN LISPRO 100 [IU]/ML
0-8 INJECTION, SOLUTION INTRAVENOUS; SUBCUTANEOUS
Status: DISCONTINUED | OUTPATIENT
Start: 2023-12-27 | End: 2023-12-31 | Stop reason: HOSPADM

## 2023-12-26 RX ORDER — SODIUM CHLORIDE 9 MG/ML
INJECTION, SOLUTION INTRAVENOUS CONTINUOUS
Status: DISCONTINUED | OUTPATIENT
Start: 2023-12-26 | End: 2023-12-27

## 2023-12-26 RX ORDER — ONDANSETRON 4 MG/1
4 TABLET, ORALLY DISINTEGRATING ORAL EVERY 8 HOURS PRN
Status: DISCONTINUED | OUTPATIENT
Start: 2023-12-26 | End: 2023-12-31 | Stop reason: HOSPADM

## 2023-12-26 RX ORDER — SODIUM CHLORIDE 0.9 % (FLUSH) 0.9 %
10 SYRINGE (ML) INJECTION EVERY 12 HOURS SCHEDULED
Status: DISCONTINUED | OUTPATIENT
Start: 2023-12-26 | End: 2023-12-31 | Stop reason: HOSPADM

## 2023-12-26 RX ADMIN — SODIUM CHLORIDE 1000 ML: 9 INJECTION, SOLUTION INTRAVENOUS at 18:49

## 2023-12-26 RX ADMIN — SODIUM CHLORIDE 1000 ML: 9 INJECTION, SOLUTION INTRAVENOUS at 20:25

## 2023-12-26 RX ADMIN — PANTOPRAZOLE SODIUM 40 MG: 40 INJECTION, POWDER, FOR SOLUTION INTRAVENOUS at 18:51

## 2023-12-26 RX ADMIN — CEFTRIAXONE SODIUM 1000 MG: 1 INJECTION, POWDER, FOR SOLUTION INTRAMUSCULAR; INTRAVENOUS at 22:13

## 2023-12-26 RX ADMIN — SODIUM CHLORIDE: 9 INJECTION, SOLUTION INTRAVENOUS at 22:15

## 2023-12-26 ASSESSMENT — LIFESTYLE VARIABLES
HOW OFTEN DO YOU HAVE A DRINK CONTAINING ALCOHOL: NEVER
HOW MANY STANDARD DRINKS CONTAINING ALCOHOL DO YOU HAVE ON A TYPICAL DAY: PATIENT DOES NOT DRINK

## 2023-12-26 NOTE — ED NOTES
Department of Emergency Medicine  FIRST PROVIDER TRIAGE NOTE             Independent MLP           12/26/23  4:23 PM EST    Date of Encounter: 12/26/23   MRN: [id number]      HPI: Nadira Ricks is a 58 y.o. male who presents to the ED for Other (Sent by Bogdan Aleman for renal failure, ), Hiccups (X 4 days), Chills, and Eye Problem (Pain behind eyes)       ROS: Negative for cp or sob. PE: Gen Appearance/Constitutional: alert  HEENT: NC/NT. PERRLA,  Airway patent. Initial Plan of Care: All treatment areas with department are currently occupied. Plan to order/Initiate the following while awaiting opening in ED: EKG.   Initiate Treatment-Testing, Proceed toTreatment Area When Bed Available for ED Attending/MLP to Continue Care    Electronically signed by Chayito Jones PA-C   DD: 12/26/23      Chayito Jones PA-C  12/26/23 [id number]

## 2023-12-26 NOTE — PROGRESS NOTES
Pupils: Pupils are equal, round, and reactive to light. Neck:      Thyroid: No thyromegaly. Vascular: No JVD. Cardiovascular:      Rate and Rhythm: Normal rate and regular rhythm. Heart sounds: Normal heart sounds. No murmur heard. No friction rub. No gallop. Pulmonary:      Effort: Pulmonary effort is normal. No respiratory distress. Breath sounds: Normal breath sounds. No stridor. No wheezing or rales. Chest:      Chest wall: No tenderness. Abdominal:      General: Bowel sounds are normal. There is no distension. Palpations: Abdomen is soft. There is no mass. Tenderness: There is no abdominal tenderness. There is no guarding or rebound. Musculoskeletal:         General: Normal range of motion. Cervical back: Normal range of motion and neck supple. Lymphadenopathy:      Cervical: No cervical adenopathy. Skin:     General: Skin is warm and dry. Coloration: Skin is not pale. Findings: No erythema or rash. Neurological:      Mental Status: He is alert and oriented to person, place, and time. Cranial Nerves: No cranial nerve deficit. Motor: No abnormal muscle tone. Coordination: Coordination normal.      Deep Tendon Reflexes: Reflexes are normal and symmetric. Psychiatric:         Behavior: Behavior normal.         Judgment: Judgment normal.                               ASSESSMENT/PLAN:    Roxy Olivares was seen today for sinus problem, fatigue, fever, headache and drainage. Diagnoses and all orders for this visit:    Cough, unspecified type  -     POCT Influenza A/B  -     POCT COVID-19, Antigen  -     CBC with Auto Differential; Future  -     Comprehensive Metabolic Panel; Future  -     CT HEAD WO CONTRAST; Future    Nonintractable headache, unspecified chronicity pattern, unspecified headache type  -     CBC with Auto Differential; Future  -     Comprehensive Metabolic Panel; Future  -     CT HEAD WO CONTRAST;  Future    Hiccups  -

## 2023-12-27 ENCOUNTER — APPOINTMENT (OUTPATIENT)
Dept: GENERAL RADIOLOGY | Age: 63
DRG: 682 | End: 2023-12-27
Payer: COMMERCIAL

## 2023-12-27 LAB
25(OH)D3 SERPL-MCNC: 11.5 NG/ML (ref 30–100)
ALBUMIN SERPL-MCNC: 3.2 G/DL (ref 3.5–5.2)
ALP SERPL-CCNC: 191 U/L (ref 40–129)
ALT SERPL-CCNC: 85 U/L (ref 0–40)
ANION GAP SERPL CALCULATED.3IONS-SCNC: 22 MMOL/L (ref 7–16)
ANION GAP SERPL CALCULATED.3IONS-SCNC: 23 MMOL/L (ref 7–16)
ANION GAP SERPL CALCULATED.3IONS-SCNC: 25 MMOL/L (ref 7–16)
AST SERPL-CCNC: 41 U/L (ref 0–39)
B-OH-BUTYR SERPL-MCNC: 0.24 MMOL/L (ref 0.02–0.27)
BASOPHILS # BLD: 0.02 K/UL (ref 0–0.2)
BASOPHILS NFR BLD: 0 % (ref 0–2)
BILIRUB SERPL-MCNC: 0.2 MG/DL (ref 0–1.2)
BUN SERPL-MCNC: 129 MG/DL (ref 6–23)
BUN SERPL-MCNC: 144 MG/DL (ref 6–23)
BUN SERPL-MCNC: 145 MG/DL (ref 6–23)
CA-I BLD-SCNC: 0.96 MMOL/L (ref 1.15–1.33)
CALCIUM SERPL-MCNC: 6.6 MG/DL (ref 8.6–10.2)
CALCIUM SERPL-MCNC: 6.7 MG/DL (ref 8.6–10.2)
CALCIUM SERPL-MCNC: 6.7 MG/DL (ref 8.6–10.2)
CHLORIDE SERPL-SCNC: 87 MMOL/L (ref 98–107)
CHLORIDE SERPL-SCNC: 89 MMOL/L (ref 98–107)
CHLORIDE SERPL-SCNC: 93 MMOL/L (ref 98–107)
CHP ED QC CHECK: NORMAL
CK SERPL-CCNC: 534 U/L (ref 20–200)
CO2 SERPL-SCNC: 10 MMOL/L (ref 22–29)
CREAT SERPL-MCNC: 6.7 MG/DL (ref 0.7–1.2)
CREAT SERPL-MCNC: 7.5 MG/DL (ref 0.7–1.2)
CREAT SERPL-MCNC: 8 MG/DL (ref 0.7–1.2)
CREAT UR-MCNC: 87.1 MG/DL (ref 40–278)
CREAT UR-MCNC: 88.6 MG/DL (ref 40–278)
CRP SERPL HS-MCNC: 61 MG/L (ref 0–5)
EOSINOPHIL # BLD: 0.2 K/UL (ref 0.05–0.5)
EOSINOPHILS RELATIVE PERCENT: 2 % (ref 0–6)
ERYTHROCYTE [DISTWIDTH] IN BLOOD BY AUTOMATED COUNT: 14.6 % (ref 11.5–15)
ERYTHROCYTE [SEDIMENTATION RATE] IN BLOOD BY WESTERGREN METHOD: 60 MM/HR (ref 0–15)
FERRITIN SERPL-MCNC: 486 NG/ML
FOLATE SERPL-MCNC: 11.5 NG/ML (ref 4.8–24.2)
GFR SERPL CREATININE-BSD FRML MDRD: 7 ML/MIN/1.73M2
GFR SERPL CREATININE-BSD FRML MDRD: 8 ML/MIN/1.73M2
GFR SERPL CREATININE-BSD FRML MDRD: 9 ML/MIN/1.73M2
GLUCOSE BLD-MCNC: 125 MG/DL (ref 74–99)
GLUCOSE BLD-MCNC: 134 MG/DL (ref 74–99)
GLUCOSE BLD-MCNC: 137 MG/DL
GLUCOSE BLD-MCNC: 137 MG/DL (ref 74–99)
GLUCOSE BLD-MCNC: 162 MG/DL (ref 74–99)
GLUCOSE SERPL-MCNC: 118 MG/DL (ref 74–99)
GLUCOSE SERPL-MCNC: 125 MG/DL (ref 74–99)
GLUCOSE SERPL-MCNC: 136 MG/DL (ref 74–99)
HBA1C MFR BLD: 6.6 % (ref 4–5.6)
HCT VFR BLD AUTO: 29.1 % (ref 37–54)
HGB BLD-MCNC: 10 G/DL (ref 12.5–16.5)
IMM GRANULOCYTES # BLD AUTO: 0.15 K/UL (ref 0–0.58)
IMM GRANULOCYTES NFR BLD: 1 % (ref 0–5)
IRON SATN MFR SERPL: 34 % (ref 20–55)
IRON SERPL-MCNC: 91 UG/DL (ref 59–158)
LACTATE BLDV-SCNC: 0.7 MMOL/L (ref 0.5–1.9)
LACTATE BLDV-SCNC: 0.7 MMOL/L (ref 0.5–2.2)
LYMPHOCYTES NFR BLD: 0.72 K/UL (ref 1.5–4)
LYMPHOCYTES RELATIVE PERCENT: 7 % (ref 20–42)
MAGNESIUM SERPL-MCNC: 2.2 MG/DL (ref 1.6–2.6)
MCH RBC QN AUTO: 30.7 PG (ref 26–35)
MCHC RBC AUTO-ENTMCNC: 34.4 G/DL (ref 32–34.5)
MCV RBC AUTO: 89.3 FL (ref 80–99.9)
MONOCYTES NFR BLD: 0.36 K/UL (ref 0.1–0.95)
MONOCYTES NFR BLD: 4 % (ref 2–12)
NEUTROPHILS NFR BLD: 86 % (ref 43–80)
NEUTS SEG NFR BLD: 8.9 K/UL (ref 1.8–7.3)
OSMOLALITY SERPL: 313 MOSM/KG (ref 285–310)
OSMOLALITY UR: 343 MOSM/KG (ref 300–900)
PLATELET # BLD AUTO: 128 K/UL (ref 130–450)
PMV BLD AUTO: 10.8 FL (ref 7–12)
POTASSIUM SERPL-SCNC: 3.9 MMOL/L (ref 3.5–5)
POTASSIUM SERPL-SCNC: 4.2 MMOL/L (ref 3.5–5)
POTASSIUM SERPL-SCNC: 4.3 MMOL/L (ref 3.5–5)
PROCALCITONIN SERPL-MCNC: 1.62 NG/ML (ref 0–0.08)
PROT SERPL-MCNC: 5.9 G/DL (ref 6.4–8.3)
RBC # BLD AUTO: 3.26 M/UL (ref 3.8–5.8)
SODIUM SERPL-SCNC: 122 MMOL/L (ref 132–146)
SODIUM SERPL-SCNC: 122 MMOL/L (ref 132–146)
SODIUM SERPL-SCNC: 125 MMOL/L (ref 132–146)
SODIUM UR-SCNC: 39 MMOL/L
TIBC SERPL-MCNC: 265 UG/DL (ref 250–450)
TOTAL PROTEIN, URINE: 25 MG/DL (ref 0–12)
TRANSFERRIN SERPL-MCNC: 213 MG/DL (ref 200–360)
URINE TOTAL PROTEIN CREATININE RATIO: 0.29 (ref 0–0.2)
UUN UR-MCNC: 510 MG/DL (ref 800–1666)
VIT B12 SERPL-MCNC: 524 PG/ML (ref 211–946)
WBC OTHER # BLD: 10.4 K/UL (ref 4.5–11.5)

## 2023-12-27 PROCEDURE — 6360000002 HC RX W HCPCS: Performed by: STUDENT IN AN ORGANIZED HEALTH CARE EDUCATION/TRAINING PROGRAM

## 2023-12-27 PROCEDURE — 82570 ASSAY OF URINE CREATININE: CPT

## 2023-12-27 PROCEDURE — 6360000002 HC RX W HCPCS

## 2023-12-27 PROCEDURE — 80074 ACUTE HEPATITIS PANEL: CPT

## 2023-12-27 PROCEDURE — 2580000003 HC RX 258: Performed by: NURSE PRACTITIONER

## 2023-12-27 PROCEDURE — 71045 X-RAY EXAM CHEST 1 VIEW: CPT

## 2023-12-27 PROCEDURE — 80053 COMPREHEN METABOLIC PANEL: CPT

## 2023-12-27 PROCEDURE — C9113 INJ PANTOPRAZOLE SODIUM, VIA: HCPCS | Performed by: STUDENT IN AN ORGANIZED HEALTH CARE EDUCATION/TRAINING PROGRAM

## 2023-12-27 PROCEDURE — 51798 US URINE CAPACITY MEASURE: CPT

## 2023-12-27 PROCEDURE — 6370000000 HC RX 637 (ALT 250 FOR IP)

## 2023-12-27 PROCEDURE — 82746 ASSAY OF FOLIC ACID SERUM: CPT

## 2023-12-27 PROCEDURE — 82962 GLUCOSE BLOOD TEST: CPT

## 2023-12-27 PROCEDURE — 6370000000 HC RX 637 (ALT 250 FOR IP): Performed by: STUDENT IN AN ORGANIZED HEALTH CARE EDUCATION/TRAINING PROGRAM

## 2023-12-27 PROCEDURE — 83605 ASSAY OF LACTIC ACID: CPT

## 2023-12-27 PROCEDURE — 6370000000 HC RX 637 (ALT 250 FOR IP): Performed by: NURSE PRACTITIONER

## 2023-12-27 PROCEDURE — 2500000003 HC RX 250 WO HCPCS: Performed by: NURSE PRACTITIONER

## 2023-12-27 PROCEDURE — 2580000003 HC RX 258: Performed by: STUDENT IN AN ORGANIZED HEALTH CARE EDUCATION/TRAINING PROGRAM

## 2023-12-27 PROCEDURE — 82306 VITAMIN D 25 HYDROXY: CPT

## 2023-12-27 PROCEDURE — 84145 PROCALCITONIN (PCT): CPT

## 2023-12-27 PROCEDURE — 6360000002 HC RX W HCPCS: Performed by: NURSE PRACTITIONER

## 2023-12-27 PROCEDURE — 83540 ASSAY OF IRON: CPT

## 2023-12-27 PROCEDURE — A4216 STERILE WATER/SALINE, 10 ML: HCPCS | Performed by: STUDENT IN AN ORGANIZED HEALTH CARE EDUCATION/TRAINING PROGRAM

## 2023-12-27 PROCEDURE — 85025 COMPLETE CBC W/AUTO DIFF WBC: CPT

## 2023-12-27 PROCEDURE — 84300 ASSAY OF URINE SODIUM: CPT

## 2023-12-27 PROCEDURE — 83036 HEMOGLOBIN GLYCOSYLATED A1C: CPT

## 2023-12-27 PROCEDURE — 2580000003 HC RX 258

## 2023-12-27 PROCEDURE — 2060000000 HC ICU INTERMEDIATE R&B

## 2023-12-27 PROCEDURE — 83735 ASSAY OF MAGNESIUM: CPT

## 2023-12-27 PROCEDURE — 84156 ASSAY OF PROTEIN URINE: CPT

## 2023-12-27 PROCEDURE — 83930 ASSAY OF BLOOD OSMOLALITY: CPT

## 2023-12-27 PROCEDURE — 84166 PROTEIN E-PHORESIS/URINE/CSF: CPT

## 2023-12-27 PROCEDURE — 80048 BASIC METABOLIC PNL TOTAL CA: CPT

## 2023-12-27 PROCEDURE — 82550 ASSAY OF CK (CPK): CPT

## 2023-12-27 PROCEDURE — 82728 ASSAY OF FERRITIN: CPT

## 2023-12-27 PROCEDURE — 86140 C-REACTIVE PROTEIN: CPT

## 2023-12-27 PROCEDURE — 85652 RBC SED RATE AUTOMATED: CPT

## 2023-12-27 PROCEDURE — 84540 ASSAY OF URINE/UREA-N: CPT

## 2023-12-27 PROCEDURE — 82330 ASSAY OF CALCIUM: CPT

## 2023-12-27 PROCEDURE — 84466 ASSAY OF TRANSFERRIN: CPT

## 2023-12-27 PROCEDURE — 82607 VITAMIN B-12: CPT

## 2023-12-27 PROCEDURE — 83935 ASSAY OF URINE OSMOLALITY: CPT

## 2023-12-27 PROCEDURE — 86317 IMMUNOASSAY INFECTIOUS AGENT: CPT

## 2023-12-27 PROCEDURE — 82010 KETONE BODYS QUAN: CPT

## 2023-12-27 RX ORDER — HEPARIN SODIUM 1000 [USP'U]/ML
4000 INJECTION, SOLUTION INTRAVENOUS; SUBCUTANEOUS PRN
Status: DISCONTINUED | OUTPATIENT
Start: 2023-12-27 | End: 2023-12-27

## 2023-12-27 RX ORDER — ASPIRIN 81 MG/1
81 TABLET ORAL DAILY
COMMUNITY

## 2023-12-27 RX ORDER — HEPARIN SODIUM 1000 [USP'U]/ML
2000 INJECTION, SOLUTION INTRAVENOUS; SUBCUTANEOUS PRN
Status: DISCONTINUED | OUTPATIENT
Start: 2023-12-27 | End: 2023-12-27

## 2023-12-27 RX ORDER — CALCIUM GLUCONATE 10 MG/ML
1000 INJECTION, SOLUTION INTRAVENOUS ONCE
Status: COMPLETED | OUTPATIENT
Start: 2023-12-27 | End: 2023-12-27

## 2023-12-27 RX ORDER — PANTOPRAZOLE SODIUM 40 MG/10ML
40 INJECTION, POWDER, LYOPHILIZED, FOR SOLUTION INTRAVENOUS 2 TIMES DAILY
Status: DISCONTINUED | OUTPATIENT
Start: 2023-12-27 | End: 2023-12-27 | Stop reason: SDUPTHER

## 2023-12-27 RX ORDER — SUCRALFATE 1 G/1
1 TABLET ORAL
Status: DISCONTINUED | OUTPATIENT
Start: 2023-12-27 | End: 2023-12-31 | Stop reason: HOSPADM

## 2023-12-27 RX ORDER — BACLOFEN 10 MG/1
10 TABLET ORAL ONCE
Status: COMPLETED | OUTPATIENT
Start: 2023-12-27 | End: 2023-12-27

## 2023-12-27 RX ORDER — HEPARIN SODIUM 10000 [USP'U]/100ML
5-30 INJECTION, SOLUTION INTRAVENOUS CONTINUOUS
Status: DISCONTINUED | OUTPATIENT
Start: 2023-12-28 | End: 2023-12-27

## 2023-12-27 RX ORDER — HEPARIN SODIUM 1000 [USP'U]/ML
4000 INJECTION, SOLUTION INTRAVENOUS; SUBCUTANEOUS ONCE
Status: DISCONTINUED | OUTPATIENT
Start: 2023-12-28 | End: 2023-12-27

## 2023-12-27 RX ORDER — SODIUM CHLORIDE, SODIUM LACTATE, POTASSIUM CHLORIDE, CALCIUM CHLORIDE 600; 310; 30; 20 MG/100ML; MG/100ML; MG/100ML; MG/100ML
INJECTION, SOLUTION INTRAVENOUS CONTINUOUS
Status: DISCONTINUED | OUTPATIENT
Start: 2023-12-27 | End: 2023-12-28

## 2023-12-27 RX ORDER — SODIUM BICARBONATE 650 MG/1
1300 TABLET ORAL 3 TIMES DAILY
Status: DISCONTINUED | OUTPATIENT
Start: 2023-12-27 | End: 2023-12-31 | Stop reason: HOSPADM

## 2023-12-27 RX ADMIN — ROSUVASTATIN 10 MG: 10 TABLET, FILM COATED ORAL at 01:50

## 2023-12-27 RX ADMIN — FLUTICASONE PROPIONATE 2 SPRAY: 50 SPRAY, METERED NASAL at 11:30

## 2023-12-27 RX ADMIN — SODIUM CHLORIDE, PRESERVATIVE FREE 40 MG: 5 INJECTION INTRAVENOUS at 19:00

## 2023-12-27 RX ADMIN — WATER 1000 MG: 1 INJECTION INTRAMUSCULAR; INTRAVENOUS; SUBCUTANEOUS at 21:07

## 2023-12-27 RX ADMIN — CALCIUM GLUCONATE 1000 MG: 10 INJECTION, SOLUTION INTRAVENOUS at 11:33

## 2023-12-27 RX ADMIN — SUCRALFATE 1 G: 1 TABLET ORAL at 17:35

## 2023-12-27 RX ADMIN — HEPARIN SODIUM 5000 UNITS: 10000 INJECTION INTRAVENOUS; SUBCUTANEOUS at 07:00

## 2023-12-27 RX ADMIN — FENOFIBRATE 160 MG: 160 TABLET ORAL at 15:19

## 2023-12-27 RX ADMIN — HEPARIN SODIUM 5000 UNITS: 10000 INJECTION INTRAVENOUS; SUBCUTANEOUS at 15:15

## 2023-12-27 RX ADMIN — SODIUM BICARBONATE 1300 MG: 650 TABLET ORAL at 15:18

## 2023-12-27 RX ADMIN — HEPARIN SODIUM 5000 UNITS: 10000 INJECTION INTRAVENOUS; SUBCUTANEOUS at 01:50

## 2023-12-27 RX ADMIN — SODIUM BICARBONATE 1300 MG: 650 TABLET ORAL at 19:00

## 2023-12-27 RX ADMIN — SODIUM CHLORIDE, POTASSIUM CHLORIDE, SODIUM LACTATE AND CALCIUM CHLORIDE: 600; 310; 30; 20 INJECTION, SOLUTION INTRAVENOUS at 13:49

## 2023-12-27 RX ADMIN — BACLOFEN 10 MG: 10 TABLET ORAL at 15:49

## 2023-12-27 RX ADMIN — SODIUM CHLORIDE, PRESERVATIVE FREE 40 MG: 5 INJECTION INTRAVENOUS at 11:27

## 2023-12-27 RX ADMIN — SODIUM CHLORIDE 5 MG/HR: 900 INJECTION, SOLUTION INTRAVENOUS at 21:10

## 2023-12-27 RX ADMIN — SUCRALFATE 1 G: 1 TABLET ORAL at 19:00

## 2023-12-27 ASSESSMENT — PAIN SCALES - GENERAL
PAINLEVEL_OUTOF10: 0

## 2023-12-27 ASSESSMENT — PAIN - FUNCTIONAL ASSESSMENT: PAIN_FUNCTIONAL_ASSESSMENT: NONE - DENIES PAIN

## 2023-12-27 NOTE — CONSULTS
GENERAL SURGERY  CONSULT NOTE  12/27/2023    Physician Consulted: Dr. Julia Salazar  Reason for Consult: duodenitis   Referring Physician: Dr. Meng Mancia     HPI  Idalmis So is a 58 y.o. male with hx of DM, HLD, HTN, JINA who was transferred from [facility] for evaluation of MICHELLE. General surgery was consulted for evaluation of duodenitis and anemia. The pateint was initially seen for eye pain and chills at his PCP's office and sent to [facility]. Workup had revealed Cr 8.1  from normal Cr and BUN 12/03/2023. CTAP showed duodenal thickening concerning for duodenitis. Thus general surgery was consulted. He denies any abdominal pain, discomfort, reflux, or pain with meals. States he has not had any bloody stools or dark tarry stools. He does report that he has been sick recently but no recent steroids or frequent NSAID use. Denies tobacco or alcohol use. No family hx of inflammatory bowel disease. Hx of colonoscopy in 2011. Of note no family hx of renal disease. Patient himself reports that he has prashanth urinating much more frequently than usual.     Received 2L boluses in the ED. Hgb 11.9-> 10. CTAP w duodenal wall thickening. No renal obstruction.        Past Medical History:   Diagnosis Date    Chronic sinusitis     disease - for surgical encounter 7/18/14    Diabetes mellitus ([address])     dx 10/13; type II    Environmental allergies     Heart murmur     Hyperlipidemia     Hypertension     stable per patient    Sleep apnea     uses c pap       Past Surgical History:   Procedure Laterality Date    COLONOSCOPY  10/13/11    COLONOSCOPY  11/09/2016    DR BARRAZA    ECHO COMPL W DOP COLOR FLOW  2/27/2013         NASAL SINUS SURGERY      times 4    SINUS ENDOSCOPY Bilateral 11/4/2022    LEFT ENDOSCOPIC MEDIAL ANTROSTOMY RIGHT MAXILLARY SINUSOTOMY WITH REMOVAL OF SOFT TISSUE BILATERALLY performed by Ashwin Pryor MD at [address] Bilateral 7/18/2014    TONSILLECTOMY         Medications Prior to Admission [FreeTextEntry1] : Valve disease: clean coronaries on cardiac cath in 2014. status post AVR, MV repair Dr. Caio Louise 9/9/2014) SBE prophylaxis. okay to stop ASA 1 week before dental extraction and implant. EKG with 1 PVC. Echo done 7/2018. EF 40%  Valves OK.  Echo update Nov 2019 , EF 55-60 ??.  Echo done 1/21, EF 35-40 % closer to his baseline.  Ao V probably NL prosthetic valve. EF 30% in [facility] March 2021 and Sept 2021. EF 25-30 % on echo done here Aug 2021 Echo May 2022, EF 25 % with mod-severe MR..\par \par SOB/CHF- BNP sent, echo from 1/2021 with a drop in EF.  Await BNP level, 550.  EF 23 % on Nuc, 30 % on echo at [facility]. EF 50 % on echo in 2014, 45-50% in 2016, 40% in 2018, all were done at [facility].  Now its 23 % on Nuc and 30 % on echo at [facility] from March 2021.  Has CHF, BNP around 3-5 K [place] BW is 175 lbs., May be 170.  Remains with MONTEJO, will refer to the HF team.  Now on Entresto.  Labs were drawn today in ER, Cr 1.88. Clinically stable.\par \par Abdominal Pain- worse if he hits a pothole. Will screen for a AAA. If (-) will refer to GI (Dr Cortez).  SONO (-) FOR AAA, F/U WITH Dr CORTEZ. Will discuss with Dr Cortez.  Found to have severe gastritis and H pylori.  Pain improving.  Also better with diuresis. Trial of Pepcid and Simethicone.  Also better with diuresis.  Pain getting worse again, to f/u with Dr Cortez Feb 6. .  Only taking Pepcid, will have him get back on his Protonix.\par \par Rectal fissure- Balmex and mineral oil\par \par Chest/abd pain- will treat with Protonix x 30 days.  New EKG changes 11/14/19 not seen before, deep Tw inversions, QTc 474.  I suspect post pacing artifact, remotely  PUD, Trop sent, echo ordered.  Trop NL, EF and wall motion normal on echo. CCTA done Nov 1st, 2019 is clean. No coronary disease.  More upper abd discomfort, will give a trial of protonix. Still with pain, will screen for AAA, refer to GI.  Did not see GI, AAA screen is (-). He reports improvement with HCTZ . Now on Torsemide 10 mg daily.  Better after diuresis in ER Sept 28.\par \par PreOP- complex pt but cleared for dental work.  Has h/o VT so no epinephrine.  Has AICD so no cautery unless AICD is shut off to avoid inappropriate shocks.  Needs SBE prophylaxis for dental work only. Pt is cleared for cataract surgery\par \par PAF- changed from ASA to Eliquis by Dr ARIZMENDI.  No signs of trauma or bleeding on exam. Pt reassured that the Eliquis is not causing the pain.  Off Eliquis by Dr ARIZMENDI due to not feeling well.  Will rechallenge with 2.5 BID, discussed with Dr ARIZMENDI.  Amio restarted at 200 mg daily(pt stopped the load when the pills finished up)  \par \par VT- has non-sustained, i spoke to Dr Palencia who does not want him to run the [facility]. There is  a VT and syncope/sudden death risk, also has a thoracic aneurysm.  I discussed this with him and his wife, walking the Marathon is OK but no jogging.  He walked it and did well. Now seeing Dr Jeff.  Had syncope and adm to [facility], trans to [facility].  VT RFA, Sotolol changed to Mexitil Sept 2019.  CAN RETURN TO WORK NEXT MONDAY.  Had VF 7/6/21 and background VT, AICD DC.  Pt was started on Amio.  Dr ARIZMENDI called. Pt reports facial hyperpigmentation on Amio\par \par Aortic aneurysm: Enlarged. 42 (ascending) 47 (descending) in 2014\par Followup in 2015 reveals a max Ao of 46 mm\par CT angiogram 3/6/2017 aneurysm unchanged\par annual CT, due 3/2018.  Aorta 46mm April 2018, referred back to Dr Garcia,  48mm on CT 2019\par \par HLD: on pravastatin 40mg, had muscle aches on 80mg,  8/2017, pt reported he was only taking it once in a while, now taking it daily will repeat lipid panel. Diet and exercise discussed in detail.   Feb 2018,  July 2020. starting CRESTOR 10 qod, pt off it\par \par HTN: Have DC Lasix and changed to HCTZ weekly.  BP very good, Reduce Micardis  40 for persistent elevated K.  Increase HCTZ 25 to M & F. BP OK.  Micardis now at 20mg, DCed due to elevated Cr and K.  Tolerating Valsartan 40 mg. \par \par CKD: 1.33 8/8/2017, repeat BMP Sept 2017, Cr 1.05, previously normal, on ARB  1.31 and K 5.9.  .  July 2021. Cr 1.13, K 4.5, Mg 2.1,  Cr 1.75, 1.88   Prior to Admission medications    Medication Sig Start Date End Date Taking? Authorizing Provider   levoFLOXacin (LEVAQUIN) 500 MG tablet Take 1 tablet by mouth daily for 10 days 12/26/23 1/5/24  Andrew Berkowitz DO   baclofen (LIORESAL) 10 MG tablet Take 1 tablet by mouth 3 times daily 12/26/23   Andrew Berkowitz DO   fenofibrate (TRIGLIDE) 160 MG tablet Take 160 mg by mouth daily    Mali Bragg MD   Fexofenadine HCl (ALLEGRA PO) Take by mouth daily    Mali Bragg MD   mometasone (NASONEX) 50 MCG/ACT nasal spray 2 sprays by Nasal route daily as needed    Mali Bragg MD   Multiple Vitamins-Minerals (THERAPEUTIC MULTIVITAMIN-MINERALS) tablet Take 1 tablet by mouth daily.    Mali Bragg MD   metFORMIN (GLUCOPHAGE) 500 MG tablet Take 500 mg by mouth 2 times daily (with meals).    Mali Bragg MD   rosuvastatin (CRESTOR) 40 MG tablet Take 40 mg by mouth every evening.      Mali Bragg MD   fosinopril-hydroCHLOROthiazide (MONOPRIL-HCT) 20-12.5 MG per tablet Take 1 tablet by mouth in the morning and 1 tablet in the evening.    Mali Bragg MD       No Known Allergies    History reviewed. No pertinent family history.    Social History     Tobacco Use    Smoking status: Never    Smokeless tobacco: Never   Vaping Use    Vaping Use: Never used   Substance Use Topics    Alcohol use: No    Drug use: No         Review of Systems   Constitutional:  Positive for activity change, chills and fatigue. Negative for appetite change.   HENT:  Positive for congestion and sinus pain. Negative for nosebleeds.    Eyes:  Positive for pain.   Respiratory:  Positive for shortness of breath.    Cardiovascular:  Negative for chest pain.   Gastrointestinal:  Negative for abdominal distention, abdominal pain, anal bleeding, blood in stool, constipation and diarrhea.   Genitourinary:  Negative for difficulty urinating.   Skin:  Negative for color change.   Neurological:  Positive

## 2023-12-27 NOTE — PROGRESS NOTES
Nicolas Aguilar, APRN - CNP    Your patient is on a medication that requires a renal dose adjustment. Renal Function Assessment:    Date Body Weight IBW  Adjusted BW SCr  CrCl Dialysis status   12/26/2023    Ideal body weight: 77.6 kg (171 lb 1.9 oz)  Adjusted ideal body weight: 92.3 kg (203 lb 7.5 oz) Serum creatinine: 8.5 mg/dL [facility]) 12/26/23 1715  Estimated creatinine clearance: 11 mL/min (A) N/a       Pharmacy has renally dose-adjusted the following medication(s):    Date Original Order Renally Adjusted Order   12/26/2023 Crestor 40 mg Nightly Crestor 10 mg Nightly       These changes were made per protocol according to the [facility] Renal Function-Based Dose Adjustments Policy    *Please note this dose may need readjusted if your patient's renal function significantly improves. Please contact pharmacy with any questions regarding these changes.     CARRILLO [facility]  12/26/2023  10:32 PM

## 2023-12-27 NOTE — CONSULTS
CO2 10  Lactic acid 0.7 and beta hydroxybutyrate 0.24  Sodium bicarbonate 1300 mg oral 3 times daily  IV fluids- LR  Monitor labs    4. Hyponatremia  Likely in the setting of decreased oral intake; exacerbated by outpatient fosinopril-HCTZ  Sodium 121 on 12/26--> 122 today  Check serum osmolality, urine osmolality, urine electrolytes, TSH, cortisol, uric acid  Strict I&O  Correct slowly-Target correction no more than 8-10 in 24 hours  On IV fluids - LR   Monitor BMPs every 6 hours    5. Anemia  Hemoglobin 10 today  Check SPEP and UPEP  Monitor H&H    6. Intermittent hypotension  Monitor BPs    7.   Diabetes mellitus  Hemoglobin A1c 6.6% on 12/27  Outpatient metformin on hold  On insulin lispro  Defer to primary service    ARIANA Fermin - CNP  Pt seen and examined on rounds with shayy flores in ER  Pt in arf with decreased po and uri  Continue ivf  Follow na with ivf  Alexander Reed MD

## 2023-12-27 NOTE — CARE COORDINATION
Internal Medicine On-call Care Coordination Note    I was called by the ED physician because they recommended admission for this patient and we cover their PCP. The history as I understand it after discussion with the ED physician is as follows:    Presents with abnormal labs, hiccups, and chills  Crt 8.5 was 1.2 on 12/2  ED spoke with Dr. Salomon Rosa  Also with UTI- given rocephin    I placed admission orders. Including:    Held home fosinopril-hctz and metformin  SSI  IV rocephin  Nephrology consulted in ED    Dr. Daly Gil, or our coverage will see the patient tomorrow for H&P.     Electronically signed by ARIANA Pham CNP on 12/26/2023 at 10:29 PM

## 2023-12-27 NOTE — H&P
Internal Medicine History & Physical     Name: Elizabeth Osullivan  : 1960  Chief Complaint: Other (Sent by Geno Flood for renal failure, ), Hiccups (X 4 days), Chills, and Eye Problem (Pain behind eyes)  Primary Care Physician: Thee Yee MD  Admission date: 2023  Date of service: 2023     History of Present Illness  Torrance Boast is a 58y.o. year old male who presented with a chief complaint of hiccups, chills, head ache, \"sinus issues\"    Saw patient in the ED at [facility] today he is awake appears acute on chronically ill he tells me he presented to the ED because he \"mis diagnosed himself\" with sinus issues. Was taking abx as an op for presumed bacterial sinusitis. States he is very stuffy currently. Found to have coronavirus infection with a significant MICHELLE. Of note also found to have anemia although denies any recent hematochezia melena or hematuria. He had a CT scan of his abdomen and pelvis which showed some possible duodenitis he states he has been taking a lot of NSAIDs lately unable to give me an actual dose however. Tells me his father was an alcoholic and states he only very rarely drinks alcohol. Denies smoking history. Past Medical History:   Diagnosis Date    Chronic sinusitis     disease - for surgical encounter 14    Diabetes mellitus ([address])     dx 10/13; type II    Environmental allergies     Heart murmur     Hyperlipidemia     Hypertension     stable per patient    Sleep apnea     uses c pap       Past Surgical History:   Procedure Laterality Date    COLONOSCOPY  10/13/11    COLONOSCOPY  2016    DR BARRAZA    ECHO COMPL W DOP COLOR FLOW  2013         NASAL SINUS SURGERY      times 4    SINUS ENDOSCOPY Bilateral 2022    LEFT ENDOSCOPIC MEDIAL ANTROSTOMY RIGHT MAXILLARY SINUSOTOMY WITH REMOVAL OF SOFT TISSUE BILATERALLY performed by Vivek Denny., MD at [address] Bilateral 2014    TONSILLECTOMY         History reviewed.  No pertinent melena, hematochezia, hematemesis  Genitourinary: dysuria, hematuria, increased frequency  Musculoskeletal: lower extremity edema, myalgias, arthralgias, back pain  Integumentary: rashes, itching   Neurological: headache, lightheadedness, dizziness, confusion, syncope, numbness, tingling, focal weakness  Psychiatric: depression, suicidal ideation, anxiety  Endocrine: unintentional weight change  Hematologic/Lymphatic: lymphadenopathy, easy bruising, easy bleeding   Allergic/Immunologic: recurrent infections      Objective  VITALS:  BP (!) 109/55   Pulse 93   Temp 98.2 °F (36.8 °C) (Oral)   Resp 22   Ht 1.829 m (6')   Wt 111.1 kg (245 lb)   SpO2 95%   BMI 33.23 kg/m²     Physical Exam:  General: awake, alert, oriented to person, place, time, and purpose, appears stated age, cooperative, no acute distress, pleasant, appropriate mood  Eyes: conjunctivae/corneas clear, sclera non icteric, EOMI  Ears: no obvious scars, no lesions, no masses, hearing intact  Mouth: mucous membranes moist, no obvious oral sores  Head: normocephalic, atraumatic  Neck: no JVD, no adenopathy, no thyromegaly, neck is supple, trachea is midline  Back: ROM normal, no CVA tenderness.  Chest: no pain on palpation  Lungs: clear to auscultation bilaterally, without rhonchi, crackle, wheezing, or rale, no retractions or use of accessory muscles  Heart: regular rate and regular rhythm, no murmur, normal S1, S2  Abdomen: obese distended abdomen soft non peritoneal   : Deferred   Extremities: bl le stasis dermatitis   Skin: normal color, normal texture, normal turgor, no rashes, no lesions  Neurologic:5/5 muscle strength throughout, normal muscle tone throughout, face symmetric, hearing intact, tongue midline, speech appropriate without slurring, sensation to fine touch intact in upper and lower extremities    Labs-   Lab Results   Component Value Date    WBC 10.4 12/27/2023    HGB 10.0 (L) 12/27/2023    HCT 29.1 (L) 12/27/2023     (L)

## 2023-12-28 ENCOUNTER — APPOINTMENT (OUTPATIENT)
Dept: GENERAL RADIOLOGY | Age: 63
DRG: 682 | End: 2023-12-28
Payer: COMMERCIAL

## 2023-12-28 LAB
ALBUMIN SERPL-MCNC: 3.1 G/DL (ref 3.5–5.2)
ALP SERPL-CCNC: 182 U/L (ref 40–129)
ALT SERPL-CCNC: 75 U/L (ref 0–40)
ANA PAT SER IF-IMP: ABNORMAL
ANA SER QL IA: POSITIVE
ANA TITER: ABNORMAL
ANION GAP SERPL CALCULATED.3IONS-SCNC: 23 MMOL/L (ref 7–16)
AST SERPL-CCNC: 54 U/L (ref 0–39)
BASOPHILS # BLD: 0.02 K/UL (ref 0–0.2)
BASOPHILS NFR BLD: 0 % (ref 0–2)
BILIRUB SERPL-MCNC: 0.4 MG/DL (ref 0–1.2)
BUN SERPL-MCNC: 133 MG/DL (ref 6–23)
C3 SERPL-MCNC: 130 MG/DL (ref 90–180)
C4 SERPL-MCNC: 17 MG/DL (ref 10–40)
CALCIUM SERPL-MCNC: 7.1 MG/DL (ref 8.6–10.2)
CHLORIDE SERPL-SCNC: 93 MMOL/L (ref 98–107)
CO2 SERPL-SCNC: 11 MMOL/L (ref 22–29)
CORTIS SERPL-MCNC: 25.5 UG/DL (ref 2.7–18.4)
CORTISOL COLLECTION INFO: ABNORMAL
CREAT SERPL-MCNC: 5.8 MG/DL (ref 0.7–1.2)
EKG ATRIAL RATE: 344 BPM
EKG Q-T INTERVAL: 404 MS
EKG QRS DURATION: 114 MS
EKG QTC CALCULATION (BAZETT): 496 MS
EKG R AXIS: 41 DEGREES
EKG T AXIS: 106 DEGREES
EKG VENTRICULAR RATE: 91 BPM
EOSINOPHIL # BLD: 0.24 K/UL (ref 0.05–0.5)
EOSINOPHILS RELATIVE PERCENT: 2 % (ref 0–6)
ERYTHROCYTE [DISTWIDTH] IN BLOOD BY AUTOMATED COUNT: 14.6 % (ref 11.5–15)
ERYTHROCYTE [DISTWIDTH] IN BLOOD BY AUTOMATED COUNT: 14.9 % (ref 11.5–15)
GFR SERPL CREATININE-BSD FRML MDRD: 10 ML/MIN/1.73M2
GLUCOSE BLD-MCNC: 138 MG/DL (ref 74–99)
GLUCOSE BLD-MCNC: 143 MG/DL (ref 74–99)
GLUCOSE BLD-MCNC: 152 MG/DL (ref 74–99)
GLUCOSE SERPL-MCNC: 106 MG/DL (ref 74–99)
HAV IGM SERPL QL IA: NONREACTIVE
HBV CORE IGM SERPL QL IA: NONREACTIVE
HBV SURFACE AB SERPL IA-ACNC: 3.77 MIU/ML (ref 0–9.99)
HBV SURFACE AG SERPL QL IA: NONREACTIVE
HCT VFR BLD AUTO: 28.2 % (ref 37–54)
HCT VFR BLD AUTO: 30.1 % (ref 37–54)
HCV AB SERPL QL IA: NONREACTIVE
HGB BLD-MCNC: 10 G/DL (ref 12.5–16.5)
HGB BLD-MCNC: 10.4 G/DL (ref 12.5–16.5)
IMM GRANULOCYTES # BLD AUTO: 0.43 K/UL (ref 0–0.58)
IMM GRANULOCYTES NFR BLD: 4 % (ref 0–5)
LYMPHOCYTES NFR BLD: 0.79 K/UL (ref 1.5–4)
LYMPHOCYTES RELATIVE PERCENT: 7 % (ref 20–42)
MAGNESIUM SERPL-MCNC: 2.5 MG/DL (ref 1.6–2.6)
MCH RBC QN AUTO: 31.3 PG (ref 26–35)
MCH RBC QN AUTO: 31.6 PG (ref 26–35)
MCHC RBC AUTO-ENTMCNC: 34.6 G/DL (ref 32–34.5)
MCHC RBC AUTO-ENTMCNC: 35.5 G/DL (ref 32–34.5)
MCV RBC AUTO: 88.4 FL (ref 80–99.9)
MCV RBC AUTO: 91.5 FL (ref 80–99.9)
MONOCYTES NFR BLD: 0.44 K/UL (ref 0.1–0.95)
MONOCYTES NFR BLD: 4 % (ref 2–12)
NEUTROPHILS NFR BLD: 82 % (ref 43–80)
NEUTS SEG NFR BLD: 8.93 K/UL (ref 1.8–7.3)
PHOSPHATE SERPL-MCNC: 6.9 MG/DL (ref 2.5–4.5)
PLATELET # BLD AUTO: 144 K/UL (ref 130–450)
PLATELET # BLD AUTO: 177 K/UL (ref 130–450)
PMV BLD AUTO: 10.1 FL (ref 7–12)
PMV BLD AUTO: 10.3 FL (ref 7–12)
POTASSIUM SERPL-SCNC: 4.2 MMOL/L (ref 3.5–5)
PROT SERPL-MCNC: 6 G/DL (ref 6.4–8.3)
PTH-INTACT SERPL-MCNC: 194.7 PG/ML (ref 15–65)
RBC # BLD AUTO: 3.19 M/UL (ref 3.8–5.8)
RBC # BLD AUTO: 3.29 M/UL (ref 3.8–5.8)
SODIUM SERPL-SCNC: 127 MMOL/L (ref 132–146)
TSH SERPL DL<=0.05 MIU/L-ACNC: 0.52 UIU/ML (ref 0.27–4.2)
URATE SERPL-MCNC: 11.6 MG/DL (ref 3.4–7)
WBC OTHER # BLD: 10.9 K/UL (ref 4.5–11.5)
WBC OTHER # BLD: 7.7 K/UL (ref 4.5–11.5)

## 2023-12-28 PROCEDURE — 84550 ASSAY OF BLOOD/URIC ACID: CPT

## 2023-12-28 PROCEDURE — 6370000000 HC RX 637 (ALT 250 FOR IP)

## 2023-12-28 PROCEDURE — 2060000000 HC ICU INTERMEDIATE R&B

## 2023-12-28 PROCEDURE — C9113 INJ PANTOPRAZOLE SODIUM, VIA: HCPCS | Performed by: STUDENT IN AN ORGANIZED HEALTH CARE EDUCATION/TRAINING PROGRAM

## 2023-12-28 PROCEDURE — 2580000003 HC RX 258: Performed by: NURSE PRACTITIONER

## 2023-12-28 PROCEDURE — 76937 US GUIDE VASCULAR ACCESS: CPT

## 2023-12-28 PROCEDURE — 93005 ELECTROCARDIOGRAM TRACING: CPT | Performed by: PHYSICIAN ASSISTANT

## 2023-12-28 PROCEDURE — 84165 PROTEIN E-PHORESIS SERUM: CPT

## 2023-12-28 PROCEDURE — 84100 ASSAY OF PHOSPHORUS: CPT

## 2023-12-28 PROCEDURE — 84155 ASSAY OF PROTEIN SERUM: CPT

## 2023-12-28 PROCEDURE — 99254 IP/OBS CNSLTJ NEW/EST MOD 60: CPT | Performed by: INTERNAL MEDICINE

## 2023-12-28 PROCEDURE — 82962 GLUCOSE BLOOD TEST: CPT

## 2023-12-28 PROCEDURE — APPSS60 APP SPLIT SHARED TIME 46-60 MINUTES: Performed by: PHYSICIAN ASSISTANT

## 2023-12-28 PROCEDURE — 85025 COMPLETE CBC W/AUTO DIFF WBC: CPT

## 2023-12-28 PROCEDURE — 93010 ELECTROCARDIOGRAM REPORT: CPT | Performed by: INTERNAL MEDICINE

## 2023-12-28 PROCEDURE — 6360000002 HC RX W HCPCS: Performed by: INTERNAL MEDICINE

## 2023-12-28 PROCEDURE — 71045 X-RAY EXAM CHEST 1 VIEW: CPT

## 2023-12-28 PROCEDURE — 2580000003 HC RX 258: Performed by: STUDENT IN AN ORGANIZED HEALTH CARE EDUCATION/TRAINING PROGRAM

## 2023-12-28 PROCEDURE — 85027 COMPLETE CBC AUTOMATED: CPT

## 2023-12-28 PROCEDURE — 02HV33Z INSERTION OF INFUSION DEVICE INTO SUPERIOR VENA CAVA, PERCUTANEOUS APPROACH: ICD-10-PCS | Performed by: STUDENT IN AN ORGANIZED HEALTH CARE EDUCATION/TRAINING PROGRAM

## 2023-12-28 PROCEDURE — 6370000000 HC RX 637 (ALT 250 FOR IP): Performed by: INTERNAL MEDICINE

## 2023-12-28 PROCEDURE — 80053 COMPREHEN METABOLIC PANEL: CPT

## 2023-12-28 PROCEDURE — 86039 ANTINUCLEAR ANTIBODIES (ANA): CPT

## 2023-12-28 PROCEDURE — 87081 CULTURE SCREEN ONLY: CPT

## 2023-12-28 PROCEDURE — 2580000003 HC RX 258: Performed by: INTERNAL MEDICINE

## 2023-12-28 PROCEDURE — 84443 ASSAY THYROID STIM HORMONE: CPT

## 2023-12-28 PROCEDURE — C1751 CATH, INF, PER/CENT/MIDLINE: HCPCS

## 2023-12-28 PROCEDURE — 6370000000 HC RX 637 (ALT 250 FOR IP): Performed by: NURSE PRACTITIONER

## 2023-12-28 PROCEDURE — 83970 ASSAY OF PARATHORMONE: CPT

## 2023-12-28 PROCEDURE — 36569 INSJ PICC 5 YR+ W/O IMAGING: CPT

## 2023-12-28 PROCEDURE — 2500000003 HC RX 250 WO HCPCS: Performed by: INTERNAL MEDICINE

## 2023-12-28 PROCEDURE — 86038 ANTINUCLEAR ANTIBODIES: CPT

## 2023-12-28 PROCEDURE — 2500000003 HC RX 250 WO HCPCS: Performed by: STUDENT IN AN ORGANIZED HEALTH CARE EDUCATION/TRAINING PROGRAM

## 2023-12-28 PROCEDURE — 83735 ASSAY OF MAGNESIUM: CPT

## 2023-12-28 PROCEDURE — 82533 TOTAL CORTISOL: CPT

## 2023-12-28 PROCEDURE — A4216 STERILE WATER/SALINE, 10 ML: HCPCS | Performed by: STUDENT IN AN ORGANIZED HEALTH CARE EDUCATION/TRAINING PROGRAM

## 2023-12-28 PROCEDURE — 6360000002 HC RX W HCPCS: Performed by: STUDENT IN AN ORGANIZED HEALTH CARE EDUCATION/TRAINING PROGRAM

## 2023-12-28 PROCEDURE — 86160 COMPLEMENT ANTIGEN: CPT

## 2023-12-28 PROCEDURE — 36415 COLL VENOUS BLD VENIPUNCTURE: CPT

## 2023-12-28 RX ORDER — SODIUM CHLORIDE 9 MG/ML
INJECTION, SOLUTION INTRAVENOUS PRN
Status: DISCONTINUED | OUTPATIENT
Start: 2023-12-28 | End: 2023-12-31 | Stop reason: HOSPADM

## 2023-12-28 RX ORDER — AMIODARONE HYDROCHLORIDE 200 MG/1
200 TABLET ORAL DAILY
Status: DISCONTINUED | OUTPATIENT
Start: 2023-12-29 | End: 2023-12-31 | Stop reason: HOSPADM

## 2023-12-28 RX ORDER — SODIUM CHLORIDE 0.9 % (FLUSH) 0.9 %
5-40 SYRINGE (ML) INJECTION EVERY 12 HOURS SCHEDULED
Status: DISCONTINUED | OUTPATIENT
Start: 2023-12-28 | End: 2023-12-31 | Stop reason: HOSPADM

## 2023-12-28 RX ORDER — HEPARIN 100 UNIT/ML
3 SYRINGE INTRAVENOUS PRN
Status: DISCONTINUED | OUTPATIENT
Start: 2023-12-28 | End: 2023-12-31 | Stop reason: HOSPADM

## 2023-12-28 RX ORDER — VITAMIN B COMPLEX
1000 TABLET ORAL DAILY
Status: DISCONTINUED | OUTPATIENT
Start: 2023-12-28 | End: 2023-12-31 | Stop reason: HOSPADM

## 2023-12-28 RX ORDER — HEPARIN 100 UNIT/ML
3 SYRINGE INTRAVENOUS EVERY 12 HOURS SCHEDULED
Status: DISCONTINUED | OUTPATIENT
Start: 2023-12-28 | End: 2023-12-29

## 2023-12-28 RX ORDER — LIDOCAINE HYDROCHLORIDE 10 MG/ML
5 INJECTION, SOLUTION EPIDURAL; INFILTRATION; INTRACAUDAL; PERINEURAL ONCE
Status: DISCONTINUED | OUTPATIENT
Start: 2023-12-28 | End: 2023-12-31 | Stop reason: HOSPADM

## 2023-12-28 RX ORDER — SODIUM CHLORIDE 0.9 % (FLUSH) 0.9 %
5-40 SYRINGE (ML) INJECTION PRN
Status: DISCONTINUED | OUTPATIENT
Start: 2023-12-28 | End: 2023-12-31 | Stop reason: HOSPADM

## 2023-12-28 RX ORDER — CALCIUM GLUCONATE 10 MG/ML
1000 INJECTION, SOLUTION INTRAVENOUS ONCE
Status: COMPLETED | OUTPATIENT
Start: 2023-12-28 | End: 2023-12-28

## 2023-12-28 RX ORDER — ALLOPURINOL 100 MG/1
100 TABLET ORAL DAILY
Status: DISCONTINUED | OUTPATIENT
Start: 2023-12-28 | End: 2023-12-31 | Stop reason: HOSPADM

## 2023-12-28 RX ADMIN — SODIUM CHLORIDE, PRESERVATIVE FREE 40 MG: 5 INJECTION INTRAVENOUS at 19:50

## 2023-12-28 RX ADMIN — SODIUM CHLORIDE, PRESERVATIVE FREE 40 MG: 5 INJECTION INTRAVENOUS at 09:05

## 2023-12-28 RX ADMIN — SUCRALFATE 1 G: 1 TABLET ORAL at 19:49

## 2023-12-28 RX ADMIN — ALLOPURINOL 100 MG: 100 TABLET ORAL at 13:00

## 2023-12-28 RX ADMIN — SUCRALFATE 1 G: 1 TABLET ORAL at 09:04

## 2023-12-28 RX ADMIN — AMIODARONE HYDROCHLORIDE 0.5 MG/MIN: 50 INJECTION, SOLUTION INTRAVENOUS at 23:24

## 2023-12-28 RX ADMIN — SODIUM BICARBONATE: 84 INJECTION, SOLUTION INTRAVENOUS at 23:23

## 2023-12-28 RX ADMIN — SODIUM BICARBONATE: 84 INJECTION, SOLUTION INTRAVENOUS at 11:24

## 2023-12-28 RX ADMIN — SODIUM CHLORIDE, PRESERVATIVE FREE 10 ML: 5 INJECTION INTRAVENOUS at 09:05

## 2023-12-28 RX ADMIN — SUCRALFATE 1 G: 1 TABLET ORAL at 16:34

## 2023-12-28 RX ADMIN — SODIUM BICARBONATE 1300 MG: 650 TABLET ORAL at 19:49

## 2023-12-28 RX ADMIN — WATER 1000 MG: 1 INJECTION INTRAMUSCULAR; INTRAVENOUS; SUBCUTANEOUS at 19:50

## 2023-12-28 RX ADMIN — SODIUM BICARBONATE 1300 MG: 650 TABLET ORAL at 09:05

## 2023-12-28 RX ADMIN — SODIUM BICARBONATE 1300 MG: 650 TABLET ORAL at 13:00

## 2023-12-28 RX ADMIN — FENOFIBRATE 160 MG: 160 TABLET ORAL at 09:05

## 2023-12-28 RX ADMIN — Medication 1000 UNITS: at 13:00

## 2023-12-28 RX ADMIN — SODIUM CHLORIDE, PRESERVATIVE FREE 10 ML: 5 INJECTION INTRAVENOUS at 19:51

## 2023-12-28 RX ADMIN — AMIODARONE HYDROCHLORIDE 1 MG/MIN: 50 INJECTION, SOLUTION INTRAVENOUS at 00:24

## 2023-12-28 RX ADMIN — DOXYCYCLINE 100 MG: 100 INJECTION, POWDER, LYOPHILIZED, FOR SOLUTION INTRAVENOUS at 16:39

## 2023-12-28 RX ADMIN — AMIODARONE HYDROCHLORIDE 150 MG: 50 INJECTION, SOLUTION INTRAVENOUS at 00:15

## 2023-12-28 RX ADMIN — CALCIUM GLUCONATE 1000 MG: 10 INJECTION, SOLUTION INTRAVENOUS at 10:12

## 2023-12-28 RX ADMIN — AMIODARONE HYDROCHLORIDE 0.5 MG/MIN: 50 INJECTION, SOLUTION INTRAVENOUS at 06:07

## 2023-12-28 ASSESSMENT — PAIN SCALES - GENERAL
PAINLEVEL_OUTOF10: 0

## 2023-12-28 NOTE — PROGRESS NOTES
4 Eyes Skin Assessment     NAME:  Omar Carlson  YOB: 1960  MEDICAL RECORD NUMBER:  [id number]    The patient is being assessed for  Admission    I agree that at least one RN has performed a thorough Head to Toe Skin Assessment on the patient. ALL assessment sites listed below have been assessed. Areas assessed by both nurses:    Head, Face, Ears, Shoulders, Back, Chest, Arms, Elbows, Hands, Sacrum. Buttock, Coccyx, Ischium, Legs. Feet and Heels, and Under Medical Devices         Does the Patient have a Wound?  No noted wound(s)       Ky Prevention initiated by RN: No  Wound Care Orders initiated by RN: No    Pressure Injury (Stage 3,4, Unstageable, DTI, NWPT, and Complex wounds) if present, place Wound referral order by RN under : No    New Ostomies, if present place, Ostomy referral order under : No     Nurse 1 eSignature: Electronically signed by Roel Hinkle RN on 12/28/23 at 1:17 AM EST    **SHARE this note so that the co-signing nurse can place an eSignature**    Nurse 2 eSignature: {Esignature:[id number]}

## 2023-12-28 NOTE — HOME CARE
[address] [facility] referral received for home therapy, awaiting final orders. Spoke with patient's wife and verified demographics. Will follow. Lilli Montes De Oca  [facility].

## 2023-12-28 NOTE — PROGRESS NOTES
Vascular Access Procedure Note    Procedure Date:   12/28/2023    Pre-procedure Verification/Time-Out:  The proposed risks versus benefits of this procedure were discussed in detail by the physician with  Wife, zeeshan springer . written consent was obtained from wife, Zoila Richardson. Relevant documentation was reviewed prior to procedure including signed consent form and medications. All necessary equipment for procedure is available at time of procedure yes. An audible time out was done at 1155AM by team members, correctly identifying patients name, medical record number, correct side, correct site, and correct procedure to be performed with registered nurse members of the procedure team all in agreement. Indication for Procedure:   Reason for Insertion: other amio gtt.     ASA Assessment (Required for Moderate & Deep Sedation):      Procedure:   Reason for Consultation: power PICC line    Clinician Performing Procedure:   Karina Cloud RN    Assistant:  none    Sedation:   Analgesia Used: lidocaine 1%    Procedure Details/Findings:  Catheter Tunisian Size: 5.5  Lot Number: [id number]  Product #: [id number]  Expiration Date: 12/31/2024  Maximum Barrier Precautions: cap, eye shield, full body drape, gloves, gown, handwashing, and mask  Skin Prep: chlorhexidine  Technique: modified seldinger and ultrasound guided with VPS  Attempts: 1  Exposed (cm): 2  Total (cm): 43  Placement Site: right brachial vein  Vessel Size: 0.53  Dressing: securement device, transparent dressing, and biopatch  Blood Return: Yes   Ultrasound Guidance: Yes   Arm Circumference Mid-Bicep (cm): 30  Chest X-Ray Ordered: chest x-ray  End Placement: svc    Complications:   none     Post-operative Condition:  stable  Patient Tolerated Procedure: well     Comments/Post-operative Education:   Post Procedure Interventions: patient verbalized understanding of education    Nanette Koyanagi, RN  12/28/23  12:44 PM

## 2023-12-28 NOTE — PROGRESS NOTES
[facility]  Nephrology Consult Note    Patient's Name: Cain Bennett    Reason for Consult: MICHELLE    Chief Complaint: Abnormal labs  History Obtained From:  patient, past medical records, and EMR    History of Present Illness:    Cain Bennett is a 63 y.o. male with a past medical history of diabetes mellitus, hypertension, sleep apnea, and hyperlipidemia.  He presented to the ED on 12/26 for abnormal labs, he was also reportedly having chills and fatigue.  Vital signs on 12/26 includes temperature 97.3, pulse 76, BP 97/53, and he was 99% SpO2.  Lab data on 12/26 includes sodium 121, BUN 82, CO2 14, creatinine 8.1, anion gap 25, calcium 7.7, and hemoglobin 12.8.  His lab data was repeated on 12/26 and displayed a BUN of 143 and a creatinine of 8.5.  He had a CT of the abdomen pelvis on 12/26 which showed diffuse mucosal thickening of the duodenum and proximal jejunum, hepatomegaly, diverticulosis, kidneys without hydronephrosis.  Nephrology was consulted to see the patient for MICHELLE.  He has a baseline creatinine of 1.1-1.2.  At present, patient was seen and examined.  He reports that he has been experiencing congestion.  He explained that his appetite has been less than normal.  He denies any chest pain or shortness of breath.  He denies any diarrhea or sore throat.  He denies any dizziness.  He denies any dysuria.  He does not wear oxygen at home.  He denies use of NSAIDs.    12/28: pt seen in room, continues wit hiccups and nasal congestion, headache, on 1/2ms with 75 hco3 at 100, cardiology seeing for afib. Wife present. She says he took 2 courses of augmentin and 10 days of bactrim pta. Also was taking motrin  6 for several weeks. Says he saw dr bae from ent on either 12/7 or 12/14 and was told his sinuses are open. He was having a lot of diarrhea at home. He is eating ok today. She says he never took baclofen or levaquin. She can't remember if she picked up the prescriptions.     PMH:    Past  12/28/23  0437   LABALBU 3.7 3.2* 3.1*       Ferritin   Date Value Ref Range Status   12/27/2023 486 ng/mL Final     Comment:           FERRITIN Reference Ranges:  Adult Males   20 - 60 years:    30 - 400 ng/mL  Adult females 16 - 60 years:    15 - 150 ng/mL  Adults greater than 60 years:   no established reference range  Pediatrics:  no established reference range       Iron   Date Value Ref Range Status   12/27/2023 91 59 - 158 ug/dL Final     TIBC   Date Value Ref Range Status   12/27/2023 265 250 - 450 ug/dL Final       Vitamin B-12   Date Value Ref Range Status   12/27/2023 524 211 - 946 pg/mL Final       Folate   Date Value Ref Range Status   12/27/2023 11.5 4.8 - 24.2 ng/mL Final       Lab Results   Component Value Date/Time    COLORU Yellow 12/26/2023 05:15 PM    NITRU NEGATIVE 12/26/2023 05:15 PM    GLUCOSEU NEGATIVE 12/26/2023 05:15 PM    KETUA NEGATIVE 12/26/2023 05:15 PM    UROBILINOGEN 0.2 12/26/2023 05:15 PM    BILIRUBINUR SMALL 12/26/2023 05:15 PM       Lab Results   Component Value Date/Time    CHANTAL 39 12/27/2023 12:41 PM    OSMOU 343 12/27/2023 12:41 PM       No components found for: \"URIC\"    No results found for: \"LIPIDPAN\"    Assessment and Plans:    MICHELLE stage III  MICHELLE presumed likely in the setting of decreased effective renal perfusion with decreased oral intake/diarrhea /intermittent hypotension and exacerbated by outpatient fosinopril-HCTZ and NSAIDS and bactrim  Baseline creatinine 1.1-1.2  Creatinine peaked at 8.5 with  on 12/26-->  and creatinine 8 > 133/5/8  CT abdomen 12/26 no hydronephrosis  UA specific gravity 1.025, 1+ bacteria  Fena <1  P/c ratio 25/88  Check FERNANDA, C3, C4, SPEP, UPEP  Bladder scan  Avoid nephrotoxins/NSAIDs  Strict I&O, daily weights  On IV fluids  Monitor labs  Peripheral eos neg (was on augmentin. Order urine eos)  Uo 2.7L    2. Hypocalcemia  With hypoalbuminemia  Low vit d 11.5  Supplement calcium prn  Monitor labs  Pth 194    3.   High anion gap

## 2023-12-28 NOTE — PLAN OF CARE
Problem: Metabolic/Fluid and Electrolytes - Adult  Goal: Electrolytes maintained within normal limits  12/27/2023 1953 by Kailee Hernandez RN  Outcome: Progressing  12/27/2023 1953 by Kailee Hernandez RN  Outcome: Progressing  Goal: Hemodynamic stability and optimal renal function maintained  Outcome: Progressing  Goal: Glucose maintained within prescribed range  Outcome: Progressing

## 2023-12-28 NOTE — PROGRESS NOTES
Notified ATA Cavazos NP regarding patient going into a-fib RVR with HR 140s. New order for Cardizem gtt with Cardiology consult. 2227 STAT consult to Dr Urban Pena regarding patients new onset a-fib RVR. Low BP on Cardizem gtt. 2325 Message sent to Dr Urban Pena regarding patients BP 85/46. New orders for Amio gtt.

## 2023-12-28 NOTE — PLAN OF CARE
Problem: Discharge Planning  Goal: Discharge to home or other facility with appropriate resources  Outcome: Progressing  Flowsheets (Taken 12/28/2023 0906)  Discharge to home or other facility with appropriate resources: Identify barriers to discharge with patient and caregiver     Problem: Safety - Adult  Goal: Free from fall injury  Outcome: Progressing  Flowsheets (Taken 12/28/2023 1120)  Free From Fall Injury: Instruct family/caregiver on patient safety     Problem: Metabolic/Fluid and Electrolytes - Adult  Goal: Electrolytes maintained within normal limits  Outcome: Progressing  Flowsheets (Taken 12/28/2023 0906)  Electrolytes maintained within normal limits: Monitor labs and assess patient for signs and symptoms of electrolyte imbalances  Goal: Hemodynamic stability and optimal renal function maintained  Outcome: Progressing  Flowsheets (Taken 12/28/2023 0906)  Hemodynamic stability and optimal renal function maintained: Monitor labs and assess for signs and symptoms of volume excess or deficit

## 2023-12-28 NOTE — CONSULTS
History.   PAST MEDICAL HISTORY:    Obesity, BMI 33  JINA, compliant with CPAP  HTN  HLD, on statin/Fenofibrate  Non-insulin requiring T2DM      CARDIAC TESTING    ? Exercise Stress Test 2011  Procedure- The patient underwent stress testing following the manual   protocol and the heart rate increased from 76 beats per minute to 141   without electrocardiographic changes.   The patient received 34 mCi of Tc-99m sestamibi iv for stress imaging   and 3 mCi of thallium-201 IV for resting imaging.   SPECT myocardial perfusion imaging was performed with assessment of   global left ventricular function and wall motion. All of the data was   analyzed on the independent workstation.   Findings- Quality of the study was good and there were no myocardial   perfusion defects with stress or rest. Left ventricular function was   normal globally with normal segmental wall motion and left ventricular   ejection fraction was 61%.   Impression-   No evidence for acute myocardial ischemia or infarction and without   changes since the study of July 19, 2007.     TTE Dr. Phoenix 2013  Summary  Left ventricle size is normal.  Severe left ventricular concentric hypertrophy.  Ejection fraction is visually estimated at 65-70%.  Normal right ventricular function.  There is doppler evidence of stage II diastolic dysfunction.  The left atrium is mildly dilated.  Mild mitral regurgitation.  Mild aortic regurgitation.  Mild aortic stenosis.  Mild tricuspid regurgitation.  PASP is estimated at 36 mmHg.  Borderline dilated aortic root.  The inferior vena cava is normal in size with normal respiratory  variation.  No evidence of a hemodynamically significant pericardial effusion.    PAST SURGICAL HISTORY:    Past Surgical History:   Procedure Laterality Date    COLONOSCOPY  10/13/11    COLONOSCOPY  11/09/2016    DR BARRAZA    ECHO COMPL W DOP COLOR FLOW  2/27/2013         NASAL SINUS SURGERY      times 4    SINUS ENDOSCOPY Bilateral 11/4/2022    LEFT  pending              Assessment and plan to follow as per Dr. Patricio Martin. NOTE: This report was transcribed using voice recognition software. Every effort was made to ensure accuracy; however, inadvertent computerized transcription errors may be present.       Candida Fay [address] Cardiology    Electronically signed by Candida Fay PA-C on 12/28/2023 at 7:29 AM

## 2023-12-28 NOTE — PROGRESS NOTES
PICC line ordered per Dr. Jarrod Chilel. Nephro is Ok with line. Order placed, IV team notified [facility].

## 2023-12-28 NOTE — PROGRESS NOTES
Internal Medicine Progress Note    Patient's name: Benito Tran  : 1960  Chief complaints (on day of admission): Other (Sent by [facility] for renal failure, ), Hiccups (X 4 days), Chills, and Eye Problem (Pain behind eyes)  Admission date: 2023  Date of service: 2023   Room: [address] PICU  Primary care physician: Lori Collazo MD  Reason for visit: Follow-up for pneumonia MICHELLE     Subjective  Sean Benitez was seen and examined at bedside     Doing ok today   Seems a bit better than yesterday   Wife at bedside whom is a retired nurse   Hiccups remain an issue and are intractable patient states he is miserable his kidney function and amiodarone use for a fib rvr are limited factors in what I can give him for this so will ask GI for assistance appreciate their recs     Review of Systems  There are no new complaints of chest pain, shortness of breath, abdominal pain, nausea, vomiting, diarrhea, constipation unless otherwise mentioned above.      Hospital Medications  Current Facility-Administered Medications   Medication Dose Route Frequency Provider Last Rate Last Admin    lidocaine PF 1 % injection 5 mL  5 mL IntraDERmal Once Elizabeth Taylor MD        sodium chloride flush 0.9 % injection 5-40 mL  5-40 mL IntraVENous 2 times per day Elizabeth Taylor MD        sodium chloride flush 0.9 % injection 5-40 mL  5-40 mL IntraVENous PRN Elizabeth Taylor MD        0.9 % sodium chloride infusion   IntraVENous PRN Elizabeth Taylor MD        heparin (PF) 100 UNIT/ML injection 300 Units  3 mL IntraVENous 2 times per day Elizabeth Taylor MD        heparin (PF) 100 UNIT/ML injection 300 Units  3 mL IntraVENous PRN Elizabeth Taylor MD        calcium gluconate 1000 mg in sodium chloride 100 mL  1,000 mg IntraVENous Once Raghu Garrett  mL/hr at 23 1012 1,000 mg at 23 1012    allopurinol (ZYLOPRIM) tablet 100 mg  100 mg Oral Daily Raghu Garrett MD        sodium bicarbonate 75 mEq in sodium chloride 0.45

## 2023-12-28 NOTE — CARE COORDINATION
Patient tranfserred from [facility] for evaluation of MICHELLE. C/O hiccups, chills, and pain behind eyes.   cr 6.7 Renal consulted. HGB10.0  surgery consulted. New onset afib  RVR  cardiology consulted  IV amiodorone ordered. IV Rocephin, Iv Vibramycin, and IV Protonix  ordered. Calcium 6.7 with iv replacement ordered. Na bicarb ordered. Met with patient and his wife at bedside . Patient curently sleeping and information obtained from his spouse. Patient lives with his spouse in a 1 1/2 story home with set up on first level. Patient was working and independent PTA. He has a Cpap from [facility]. His PCP is Dr. Blanquita Brennan. Plan at this time is home . No h/o [facility] or HHC. . If HHC recommended the spouse would like [facility] for therapy. Will await therapy orders and recommendations. Referral called to Micaela at [facility]. Will need PT/OT ordered when appropriate Spouse will transport home when medically cleared.  CM/SW will continue to follow

## 2023-12-29 ENCOUNTER — APPOINTMENT (OUTPATIENT)
Age: 63
DRG: 682 | End: 2023-12-29
Payer: COMMERCIAL

## 2023-12-29 LAB
ALBUMIN SERPL-MCNC: 2.5 G/DL (ref 3.5–4.7)
ALBUMIN SERPL-MCNC: 3 G/DL (ref 3.5–5.2)
ALP SERPL-CCNC: 149 U/L (ref 40–129)
ALPHA1 GLOB SERPL ELPH-MCNC: 0.4 G/DL (ref 0.2–0.4)
ALPHA2 GLOB SERPL ELPH-MCNC: 1 G/DL (ref 0.5–1)
ALT SERPL-CCNC: 62 U/L (ref 0–40)
ANION GAP SERPL CALCULATED.3IONS-SCNC: 17 MMOL/L (ref 7–16)
AST SERPL-CCNC: 34 U/L (ref 0–39)
B-GLOBULIN SERPL ELPH-MCNC: 0.8 G/DL (ref 0.8–1.3)
BASOPHILS # BLD: 0.04 K/UL (ref 0–0.2)
BASOPHILS NFR BLD: 0 % (ref 0–2)
BILIRUB SERPL-MCNC: 0.5 MG/DL (ref 0–1.2)
BUN SERPL-MCNC: 96 MG/DL (ref 6–23)
CA-I BLD-SCNC: 1.1 MMOL/L (ref 1.15–1.33)
CALCIUM SERPL-MCNC: 7.9 MG/DL (ref 8.6–10.2)
CHLORIDE SERPL-SCNC: 100 MMOL/L (ref 98–107)
CO2 SERPL-SCNC: 17 MMOL/L (ref 22–29)
CREAT SERPL-MCNC: 2.8 MG/DL (ref 0.7–1.2)
ECHO AO ASC DIAM: 3.2 CM
ECHO AO ASCENDING AORTA INDEX: 1.38 CM/M2
ECHO AO ROOT DIAM: 3.3 CM
ECHO AO ROOT INDEX: 1.42 CM/M2
ECHO AR MAX VEL PISA: 3.4 M/S
ECHO AV AREA PEAK VELOCITY: 2.6 CM2
ECHO AV AREA VTI: 2.9 CM2
ECHO AV AREA/BSA PEAK VELOCITY: 1.1 CM2/M2
ECHO AV AREA/BSA VTI: 1.3 CM2/M2
ECHO AV CUSP MM: 2 CM
ECHO AV MEAN GRADIENT: 13 MMHG
ECHO AV MEAN VELOCITY: 1.7 M/S
ECHO AV PEAK GRADIENT: 25 MMHG
ECHO AV PEAK VELOCITY: 2.5 M/S
ECHO AV REGURGITANT PHT: 737.4 MILLISECOND
ECHO AV VELOCITY RATIO: 0.72
ECHO AV VTI: 44.7 CM
ECHO BSA: 2.38 M2
ECHO EST RA PRESSURE: 3 MMHG
ECHO LA DIAMETER INDEX: 1.85 CM/M2
ECHO LA DIAMETER: 4.3 CM
ECHO LA TO AORTIC ROOT RATIO: 1.3
ECHO LA VOL A-L A2C: 57 ML (ref 18–58)
ECHO LA VOL A-L A4C: 86 ML (ref 18–58)
ECHO LA VOL MOD A2C: 57 ML (ref 18–58)
ECHO LA VOL MOD A4C: 83 ML (ref 18–58)
ECHO LA VOLUME AREA LENGTH: 73 ML
ECHO LA VOLUME INDEX A-L A2C: 25 ML/M2 (ref 16–34)
ECHO LA VOLUME INDEX A-L A4C: 37 ML/M2 (ref 16–34)
ECHO LA VOLUME INDEX AREA LENGTH: 31 ML/M2 (ref 16–34)
ECHO LA VOLUME INDEX MOD A2C: 25 ML/M2 (ref 16–34)
ECHO LA VOLUME INDEX MOD A4C: 36 ML/M2 (ref 16–34)
ECHO LV FRACTIONAL SHORTENING: 33 % (ref 28–44)
ECHO LV INTERNAL DIMENSION DIASTOLE INDEX: 2.11 CM/M2
ECHO LV INTERNAL DIMENSION DIASTOLIC: 4.9 CM (ref 4.2–5.9)
ECHO LV INTERNAL DIMENSION SYSTOLIC INDEX: 1.42 CM/M2
ECHO LV INTERNAL DIMENSION SYSTOLIC: 3.3 CM
ECHO LV ISOVOLUMETRIC RELAXATION TIME (IVRT): 138.4 MS
ECHO LV IVSD: 1.6 CM (ref 0.6–1)
ECHO LV IVSS: 2.2 CM
ECHO LV MASS 2D: 297.5 G (ref 88–224)
ECHO LV MASS INDEX 2D: 128.3 G/M2 (ref 49–115)
ECHO LV POSTERIOR WALL DIASTOLIC: 1.3 CM (ref 0.6–1)
ECHO LV POSTERIOR WALL SYSTOLIC: 1.8 CM
ECHO LV RELATIVE WALL THICKNESS RATIO: 0.53
ECHO LVOT AREA: 3.8 CM2
ECHO LVOT AV VTI INDEX: 0.8
ECHO LVOT DIAM: 2.2 CM
ECHO LVOT MEAN GRADIENT: 8 MMHG
ECHO LVOT PEAK GRADIENT: 13 MMHG
ECHO LVOT PEAK VELOCITY: 1.8 M/S
ECHO LVOT STROKE VOLUME INDEX: 58.3 ML/M2
ECHO LVOT SV: 135.3 ML
ECHO LVOT VTI: 35.6 CM
ECHO MV AREA PHT: 2.5 CM2
ECHO MV AREA VTI: 2.8 CM2
ECHO MV E DECELERATION TIME (DT): 267.8 MS
ECHO MV LVOT VTI INDEX: 1.36
ECHO MV MAX VELOCITY: 2 M/S
ECHO MV MEAN GRADIENT: 6 MMHG
ECHO MV MEAN VELOCITY: 1.1 M/S
ECHO MV PEAK GRADIENT: 16 MMHG
ECHO MV PRESSURE HALF TIME (PHT): 89.2 MS
ECHO MV VTI: 48.3 CM
ECHO PV MAX VELOCITY: 0.9 M/S
ECHO PV MEAN GRADIENT: 2 MMHG
ECHO PV MEAN VELOCITY: 0.7 M/S
ECHO PV PEAK GRADIENT: 3 MMHG
ECHO PV VTI: 17.5 CM
ECHO RIGHT VENTRICULAR SYSTOLIC PRESSURE (RVSP): 27 MMHG
ECHO RV INTERNAL DIMENSION: 3.6 CM
ECHO RV TAPSE: 2.1 CM (ref 1.7–?)
ECHO TV REGURGITANT MAX VELOCITY: 2.45 M/S
ECHO TV REGURGITANT PEAK GRADIENT: 24 MMHG
EKG ATRIAL RATE: 84 BPM
EKG P AXIS: 60 DEGREES
EKG P-R INTERVAL: 220 MS
EKG Q-T INTERVAL: 388 MS
EKG QRS DURATION: 124 MS
EKG QTC CALCULATION (BAZETT): 458 MS
EKG R AXIS: 39 DEGREES
EKG T AXIS: 103 DEGREES
EKG VENTRICULAR RATE: 84 BPM
EOSINOPHIL # BLD: 0.49 K/UL (ref 0.05–0.5)
EOSINOPHILS RELATIVE PERCENT: 4 % (ref 0–6)
ERYTHROCYTE [DISTWIDTH] IN BLOOD BY AUTOMATED COUNT: 14.5 % (ref 11.5–15)
GAMMA GLOB SERPL ELPH-MCNC: 0.7 G/DL (ref 0.7–1.6)
GFR SERPL CREATININE-BSD FRML MDRD: 24 ML/MIN/1.73M2
GLUCOSE BLD-MCNC: 137 MG/DL (ref 74–99)
GLUCOSE BLD-MCNC: 147 MG/DL (ref 74–99)
GLUCOSE BLD-MCNC: 233 MG/DL (ref 74–99)
GLUCOSE SERPL-MCNC: 135 MG/DL (ref 74–99)
HCT VFR BLD AUTO: 28.4 % (ref 37–54)
HGB BLD-MCNC: 10.1 G/DL (ref 12.5–16.5)
IMM GRANULOCYTES # BLD AUTO: 0.42 K/UL (ref 0–0.58)
IMM GRANULOCYTES NFR BLD: 4 % (ref 0–5)
LYMPHOCYTES NFR BLD: 1.02 K/UL (ref 1.5–4)
LYMPHOCYTES RELATIVE PERCENT: 9 % (ref 20–42)
MAGNESIUM SERPL-MCNC: 2.4 MG/DL (ref 1.6–2.6)
MCH RBC QN AUTO: 31.7 PG (ref 26–35)
MCHC RBC AUTO-ENTMCNC: 35.6 G/DL (ref 32–34.5)
MCV RBC AUTO: 89 FL (ref 80–99.9)
MONOCYTES NFR BLD: 0.64 K/UL (ref 0.1–0.95)
MONOCYTES NFR BLD: 6 % (ref 2–12)
NEUTROPHILS NFR BLD: 77 % (ref 43–80)
NEUTS SEG NFR BLD: 8.7 K/UL (ref 1.8–7.3)
P E INTERPRETATION, U: NORMAL
PATHOLOGIST: ABNORMAL
PATHOLOGIST: NORMAL
PHOSPHATE SERPL-MCNC: 3.9 MG/DL (ref 2.5–4.5)
PLATELET # BLD AUTO: 258 K/UL (ref 130–450)
PMV BLD AUTO: 9.8 FL (ref 7–12)
POTASSIUM SERPL-SCNC: 3.3 MMOL/L (ref 3.5–5)
PROT PATTERN SERPL ELPH-IMP: ABNORMAL
PROT SERPL-MCNC: 5.4 G/DL (ref 6.4–8.3)
PROT SERPL-MCNC: 5.8 G/DL (ref 6.4–8.3)
RBC # BLD AUTO: 3.19 M/UL (ref 3.8–5.8)
SODIUM SERPL-SCNC: 134 MMOL/L (ref 132–146)
SPECIMEN TYPE: NORMAL
WBC OTHER # BLD: 11.3 K/UL (ref 4.5–11.5)

## 2023-12-29 PROCEDURE — 6370000000 HC RX 637 (ALT 250 FOR IP): Performed by: STUDENT IN AN ORGANIZED HEALTH CARE EDUCATION/TRAINING PROGRAM

## 2023-12-29 PROCEDURE — 6370000000 HC RX 637 (ALT 250 FOR IP): Performed by: INTERNAL MEDICINE

## 2023-12-29 PROCEDURE — 2580000003 HC RX 258: Performed by: STUDENT IN AN ORGANIZED HEALTH CARE EDUCATION/TRAINING PROGRAM

## 2023-12-29 PROCEDURE — 83735 ASSAY OF MAGNESIUM: CPT

## 2023-12-29 PROCEDURE — 2500000003 HC RX 250 WO HCPCS: Performed by: INTERNAL MEDICINE

## 2023-12-29 PROCEDURE — 93306 TTE W/DOPPLER COMPLETE: CPT

## 2023-12-29 PROCEDURE — 99233 SBSQ HOSP IP/OBS HIGH 50: CPT | Performed by: INTERNAL MEDICINE

## 2023-12-29 PROCEDURE — A4216 STERILE WATER/SALINE, 10 ML: HCPCS | Performed by: STUDENT IN AN ORGANIZED HEALTH CARE EDUCATION/TRAINING PROGRAM

## 2023-12-29 PROCEDURE — C9113 INJ PANTOPRAZOLE SODIUM, VIA: HCPCS | Performed by: STUDENT IN AN ORGANIZED HEALTH CARE EDUCATION/TRAINING PROGRAM

## 2023-12-29 PROCEDURE — 84100 ASSAY OF PHOSPHORUS: CPT

## 2023-12-29 PROCEDURE — 36415 COLL VENOUS BLD VENIPUNCTURE: CPT

## 2023-12-29 PROCEDURE — 6370000000 HC RX 637 (ALT 250 FOR IP)

## 2023-12-29 PROCEDURE — 2060000000 HC ICU INTERMEDIATE R&B

## 2023-12-29 PROCEDURE — 6360000002 HC RX W HCPCS: Performed by: STUDENT IN AN ORGANIZED HEALTH CARE EDUCATION/TRAINING PROGRAM

## 2023-12-29 PROCEDURE — 93010 ELECTROCARDIOGRAM REPORT: CPT | Performed by: INTERNAL MEDICINE

## 2023-12-29 PROCEDURE — 6370000000 HC RX 637 (ALT 250 FOR IP): Performed by: NURSE PRACTITIONER

## 2023-12-29 PROCEDURE — 85025 COMPLETE CBC W/AUTO DIFF WBC: CPT

## 2023-12-29 PROCEDURE — 2580000003 HC RX 258: Performed by: NURSE PRACTITIONER

## 2023-12-29 PROCEDURE — 80053 COMPREHEN METABOLIC PANEL: CPT

## 2023-12-29 PROCEDURE — 82962 GLUCOSE BLOOD TEST: CPT

## 2023-12-29 PROCEDURE — 82330 ASSAY OF CALCIUM: CPT

## 2023-12-29 PROCEDURE — 93306 TTE W/DOPPLER COMPLETE: CPT | Performed by: INTERNAL MEDICINE

## 2023-12-29 PROCEDURE — 2580000003 HC RX 258: Performed by: INTERNAL MEDICINE

## 2023-12-29 PROCEDURE — 6370000000 HC RX 637 (ALT 250 FOR IP): Performed by: PHYSICIAN ASSISTANT

## 2023-12-29 PROCEDURE — 2500000003 HC RX 250 WO HCPCS: Performed by: STUDENT IN AN ORGANIZED HEALTH CARE EDUCATION/TRAINING PROGRAM

## 2023-12-29 RX ORDER — DICYCLOMINE HYDROCHLORIDE 10 MG/1
20 CAPSULE ORAL 4 TIMES DAILY PRN
Status: DISCONTINUED | OUTPATIENT
Start: 2023-12-29 | End: 2023-12-31 | Stop reason: HOSPADM

## 2023-12-29 RX ORDER — SIMETHICONE 80 MG
80 TABLET,CHEWABLE ORAL 4 TIMES DAILY
Status: DISCONTINUED | OUTPATIENT
Start: 2023-12-29 | End: 2023-12-31 | Stop reason: HOSPADM

## 2023-12-29 RX ADMIN — SODIUM BICARBONATE: 84 INJECTION, SOLUTION INTRAVENOUS at 11:30

## 2023-12-29 RX ADMIN — AMIODARONE HYDROCHLORIDE 200 MG: 200 TABLET ORAL at 10:33

## 2023-12-29 RX ADMIN — SODIUM CHLORIDE, PRESERVATIVE FREE 40 MG: 5 INJECTION INTRAVENOUS at 21:30

## 2023-12-29 RX ADMIN — SUCRALFATE 1 G: 1 TABLET ORAL at 10:33

## 2023-12-29 RX ADMIN — SIMETHICONE 80 MG: 80 TABLET, CHEWABLE ORAL at 17:03

## 2023-12-29 RX ADMIN — SODIUM CHLORIDE, PRESERVATIVE FREE 10 ML: 5 INJECTION INTRAVENOUS at 10:39

## 2023-12-29 RX ADMIN — SODIUM CHLORIDE, PRESERVATIVE FREE 40 MG: 5 INJECTION INTRAVENOUS at 10:33

## 2023-12-29 RX ADMIN — FENOFIBRATE 160 MG: 160 TABLET ORAL at 10:33

## 2023-12-29 RX ADMIN — SUCRALFATE 1 G: 1 TABLET ORAL at 17:03

## 2023-12-29 RX ADMIN — SODIUM BICARBONATE 1300 MG: 650 TABLET ORAL at 10:33

## 2023-12-29 RX ADMIN — DOXYCYCLINE 100 MG: 100 INJECTION, POWDER, LYOPHILIZED, FOR SOLUTION INTRAVENOUS at 05:13

## 2023-12-29 RX ADMIN — SODIUM CHLORIDE, PRESERVATIVE FREE 10 ML: 5 INJECTION INTRAVENOUS at 21:36

## 2023-12-29 RX ADMIN — DICYCLOMINE HYDROCHLORIDE 20 MG: 10 CAPSULE ORAL at 13:28

## 2023-12-29 RX ADMIN — Medication 1000 UNITS: at 10:33

## 2023-12-29 RX ADMIN — SUCRALFATE 1 G: 1 TABLET ORAL at 21:35

## 2023-12-29 RX ADMIN — APIXABAN 5 MG: 5 TABLET, FILM COATED ORAL at 21:30

## 2023-12-29 RX ADMIN — INSULIN LISPRO 2 UNITS: 100 INJECTION, SOLUTION INTRAVENOUS; SUBCUTANEOUS at 17:03

## 2023-12-29 RX ADMIN — WATER 1000 MG: 1 INJECTION INTRAMUSCULAR; INTRAVENOUS; SUBCUTANEOUS at 21:30

## 2023-12-29 RX ADMIN — SIMETHICONE 80 MG: 80 TABLET, CHEWABLE ORAL at 13:28

## 2023-12-29 RX ADMIN — DOXYCYCLINE 100 MG: 100 INJECTION, POWDER, LYOPHILIZED, FOR SOLUTION INTRAVENOUS at 17:05

## 2023-12-29 RX ADMIN — METOPROLOL TARTRATE 25 MG: 25 TABLET, FILM COATED ORAL at 21:29

## 2023-12-29 RX ADMIN — SODIUM BICARBONATE 1300 MG: 650 TABLET ORAL at 13:28

## 2023-12-29 RX ADMIN — SIMETHICONE 80 MG: 80 TABLET, CHEWABLE ORAL at 21:29

## 2023-12-29 RX ADMIN — SODIUM CHLORIDE, PRESERVATIVE FREE 10 ML: 5 INJECTION INTRAVENOUS at 10:33

## 2023-12-29 RX ADMIN — SUCRALFATE 1 G: 1 TABLET ORAL at 13:28

## 2023-12-29 RX ADMIN — SODIUM BICARBONATE 1300 MG: 650 TABLET ORAL at 21:29

## 2023-12-29 RX ADMIN — METOPROLOL TARTRATE 25 MG: 25 TABLET, FILM COATED ORAL at 13:31

## 2023-12-29 RX ADMIN — SODIUM BICARBONATE: 84 INJECTION, SOLUTION INTRAVENOUS at 23:47

## 2023-12-29 RX ADMIN — ALLOPURINOL 100 MG: 100 TABLET ORAL at 10:33

## 2023-12-29 RX ADMIN — FLUTICASONE PROPIONATE 2 SPRAY: 50 SPRAY, METERED NASAL at 13:29

## 2023-12-29 ASSESSMENT — PAIN SCALES - GENERAL: PAINLEVEL_OUTOF10: 0

## 2023-12-29 NOTE — TELEPHONE ENCOUNTER
Result Care Coordination      Result Notes     Genevieve Terrance, DO  12/26/2023  2:31 PM EST Back to Top      Discussed results with pt and wife  Labs abnormal  Renal failure- will go to main [facility]/cami Gonzalez

## 2023-12-29 NOTE — ACP (ADVANCE CARE PLANNING)
Advance Care Planning   Healthcare Decision Maker:    Primary Decision Maker: Clif Shoulder - [facility] - [phone]    Click here to complete Healthcare Decision Makers including selection of the Healthcare Decision Maker Relationship (ie \"Primary\").

## 2023-12-29 NOTE — PLAN OF CARE
Problem: Discharge Planning  Goal: Discharge to home or other facility with appropriate resources  12/28/2023 2203 by Kacey Menendez RN  Outcome: Progressing  12/28/2023 1120 by Kacey Menendez RN  Outcome: Progressing  Flowsheets (Taken 12/28/2023 3822)  Discharge to home or other facility with appropriate resources: Identify barriers to discharge with patient and caregiver     Problem: Safety - Adult  Goal: Free from fall injury  12/28/2023 2203 by Kacey Menendez RN  Outcome: Progressing  Flowsheets (Taken 12/28/2023 2202)  Free From Fall Injury: Instruct family/caregiver on patient safety  12/28/2023 1120 by Kacey Menendez RN  Outcome: Progressing  [address] (Taken 12/28/2023 1120)  Free From Fall Injury: Instruct family/caregiver on patient safety     Problem: Metabolic/Fluid and Electrolytes - Adult  Goal: Electrolytes maintained within normal limits  12/28/2023 2203 by Kacey Menendez RN  Outcome: Progressing  12/28/2023 1120 by Kacey Menendez RN  Outcome: Progressing  Flowsheets (Taken 12/28/2023 7844)  Electrolytes maintained within normal limits: Monitor labs and assess patient for signs and symptoms of electrolyte imbalances  Goal: Hemodynamic stability and optimal renal function maintained  Outcome: Progressing  Flowsheets (Taken 12/28/2023 0906)  Hemodynamic stability and optimal renal function maintained: Monitor labs and assess for signs and symptoms of volume excess or deficit

## 2023-12-29 NOTE — PROGRESS NOTES
INPATIENT CARDIOLOGY FOLLOW-UP    Name: Melvin Villagran    Age: 58 y.o. Date of Admission: 12/26/2023  4:27 PM    Date of Service: 12/29/2023    Chief Complaint: Follow-up for A-fib     Interim History:  No new overnight cardiac complaints. Currently with no complaints of CP, SOB, palpitations, dizziness, or lightheadedness. AF on telemetry.     Review of Systems:   Cardiac: As per HPI  General: No fever, chills  Pulmonary: As per HPI  HEENT: No visual disturbances, difficult swallowing  GI: No nausea, vomiting  Endocrine: No thyroid disease or DM  Musculoskeletal: CHAVEZ x 4, no focal motor deficits  Skin: Intact, no rashes  Neuro/Psych: No headache or seizures    Problem List:  Patient Active Problem List   Diagnosis    Hypertension    Heart valve disease    Hyperlipidemia    MICHELLE (acute kidney injury) ([address])    Diabetes mellitus ([address])    UTI (urinary tract infection)       Allergies:  No Known Allergies    Current Medications:  Current Facility-Administered Medications   Medication Dose Route Frequency Provider Last Rate Last Admin    simethicone (MYLICON) chewable tablet 80 mg  80 mg Oral 4x Daily Hyun Edge MD        dicyclomine (BENTYL) capsule 20 mg  20 mg Oral 4x Daily PRN Hyun Edge MD        lidocaine PF 1 % injection 5 mL  5 mL IntraDERmal Once Hyun Edge MD        sodium chloride flush 0.9 % injection 5-40 mL  5-40 mL IntraVENous 2 times per day Hyun Edge MD   10 mL at 12/29/23 1039    sodium chloride flush 0.9 % injection 5-40 mL  5-40 mL IntraVENous PRN Hyun Edge MD        0.9 % sodium chloride infusion   IntraVENous PRN Hyun Edge MD        heparin (PF) 100 UNIT/ML injection 300 Units  3 mL IntraVENous 2 times per day Hyun Edge MD        heparin (PF) 100 UNIT/ML injection 300 Units  3 mL IntraVENous PRN Hyun Edge MD        allopurinol (ZYLOPRIM) tablet 100 mg  100 mg Oral Daily Woo Manning MD   100 mg at 12/29/23 1033    sodium bicarbonate 75 mEq in sodium

## 2023-12-29 NOTE — PROGRESS NOTES
Physician Progress Note      PATIENT:               Shira Dave  CSN #:                  [id number]  :                       1960  ADMIT DATE:       2023 4:27 PM  [facility] DATE:  RESPONDING  PROVIDER #:        Amanda Adkins MD          QUERY TEXT:    Pt admitted with MICHELLE, Viral infection and bacterial pneumonia. Pt noted to   have WBC 12.6 procalcitonin 1.62 with RR 20-23 and HR over 100. If possible,   please document in the progress notes and discharge summary if you are   evaluating and /or treating any of the following: The medical record reflects the following:  Risk Factors: Viral infection, MICHELLE  Clinical Indicators: VS findings 97.3, 76, 97/53, 99%RA  98.2, 88, 23, 124/52,   97%RA ---101, 22, 111/49, 95% Lab findings WBC 12.6 10.7 procal 1.62; per H&P   appears acute on chronically ill he tells me he presented to the ED because   he \"mis diagnosed himself\" with sinus issues. Was taking abx as an op for   presumed bacterial sinusitis. States he is very stuffy currently. Found to   have coronavirus infection with a significant MICHELLE; progress note  [place]   Virus OC infection with probable super imposed bacterial pneumonia  Treatment: Rocephin and Doxy x 7 days, supportive care    Thank you  Jo INFANTE, RN, CCDS  Clinical Documentation Improvement  Options provided:  -- Sepsis, present on admission  -- pneumonia without Sepsis  -- Other - I will add my own diagnosis  -- Disagree - Not applicable / Not valid  -- Disagree - Clinically unable to determine / Unknown  -- Refer to Clinical Documentation Reviewer    PROVIDER RESPONSE TEXT:    This patient has pneumonia without Sepsis.     Query created by: Jo Zaragoza on  58:49 PM      Electronically signed by:  Amanda Adkins MD 2023 2:48 PM

## 2023-12-29 NOTE — PLAN OF CARE
Problem: Discharge Planning  Goal: Discharge to home or other facility with appropriate resources  12/29/2023 0528 by Ramon Howell RN  Outcome: Progressing  12/28/2023 2203 by Abhinav Gibson RN  Outcome: Progressing     Problem: Safety - Adult  Goal: Free from fall injury  12/29/2023 0528 by Ramon Howell RN  Outcome: Progressing  12/28/2023 2203 by Abhinav Gibson RN  Outcome: Progressing  Flowsheets (Taken 12/28/2023 2202)  Free From Fall Injury: Instruct family/caregiver on patient safety     Problem: Metabolic/Fluid and Electrolytes - Adult  Goal: Electrolytes maintained within normal limits  12/29/2023 0528 by Ramon Howell RN  Outcome: Progressing  12/28/2023 2203 by Ahbinav Gibson RN  Outcome: Progressing  Goal: Hemodynamic stability and optimal renal function maintained  Outcome: Progressing

## 2023-12-29 NOTE — PROGRESS NOTES
Internal Medicine Progress Note    Patient's name: Kendall Allred  : 1960  Chief complaints (on day of admission): Other (Sent by [facility] for renal failure, ), Hiccups (X 4 days), Chills, and Eye Problem (Pain behind eyes)  Admission date: 2023  Date of service: 2023   Room: [address] PICU  Primary care physician: Chauncey Vazquez MD  Reason for visit: Follow-up for pneumonia MICHELLE     Subjective  Roxy Olivares was seen and examined at bedside     Doing ok  Still with intractable hiccups   Talked to Dr David Delaney appreciate recs  Otherwise reviewed cardio and nephro notes  Eventual CV needed     Review of Systems  There are no new complaints of chest pain, shortness of breath, abdominal pain, nausea, vomiting, diarrhea, constipation unless otherwise mentioned above.      Hospital Medications  Current Facility-Administered Medications   Medication Dose Route Frequency Provider Last Rate Last Admin    simethicone (MYLICON) chewable tablet 80 mg  80 mg Oral 4x Daily Pb Lee MD   80 mg at 23 1328    dicyclomine (BENTYL) capsule 20 mg  20 mg Oral 4x Daily PRN Pb Lee MD   20 mg at 23 1328    metoprolol tartrate (LOPRESSOR) tablet 25 mg  25 mg Oral BID Anthony BERNARD MD   25 mg at 23 1331    apixaban (ELIQUIS) tablet 5 mg  5 mg Oral BID Collette Montgomery MD        lidocaine PF 1 % injection 5 mL  5 mL IntraDERmal Once Pb Lee MD        sodium chloride flush 0.9 % injection 5-40 mL  5-40 mL IntraVENous 2 times per day Pb Lee MD   10 mL at 23 1039    sodium chloride flush 0.9 % injection 5-40 mL  5-40 mL IntraVENous PRN Pb Lee MD        0.9 % sodium chloride infusion   IntraVENous PRN Pb Lee MD        heparin (PF) 100 UNIT/ML injection 300 Units  3 mL IntraVENous PRN Pb Lee MD        allopurinol (ZYLOPRIM) tablet 100 mg  100 mg Oral Daily Madi Ryan MD   100 mg at 23 1033    sodium bicarbonate 75 mEq in sodium chloride 0.45 % 1,000 mL infusion   IntraVENous Continuous Jackson Majano  mL/hr at 12/29/23 1130 New Bag at 12/29/23 1130    cefTRIAXone (ROCEPHIN) 1,000 mg in sterile water 10 mL IV syringe  1,000 mg IntraVENous Q24H Duane Scales MD   1,000 mg at 12/28/23 1950    doxycycline (VIBRAMYCIN) 100 mg in sodium chloride 0.9 % 100 mL IVPB ([id number])  100 mg IntraVENous Q12H Duane Scales MD   Stopped at 12/29/23 0626    Vitamin D (CHOLECALCIFEROL) tablet 1,000 Units  1,000 Units Oral Daily Jackson Majano MD   1,000 Units at 12/29/23 1033    amiodarone (CORDARONE) tablet 200 mg  200 mg Oral Daily Pooja Purcell PA-C   200 mg at 12/29/23 1033    pantoprazole (PROTONIX) 40 mg in sodium chloride (PF) 0.9 % 10 mL injection  40 mg IntraVENous BID Duane Scales MD   40 mg at 12/29/23 1033    sodium bicarbonate tablet 1,300 mg  1,300 mg Oral TID Loly Guzman APRN - CNP   1,300 mg at 12/29/23 1328    sodium chloride (OCEAN, BABY AYR) 0.65 % nasal spray 1 spray  1 spray Each Nostril Q2H PRN Duane Scales MD        sucralfate (CARAFATE) tablet 1 g  1 g Oral 4x Daily  Crystal Adkins MD   1 g at 12/29/23 1328    [Held by provider] baclofen (LIORESAL) tablet 10 mg  10 mg Oral TID Kaitlin Adamson APRN - CNP        fenofibrate (TRIGLIDE) tablet 160 mg  160 mg Oral Daily Kaitlin Adamson APRN - CNP   160 mg at 12/29/23 1033    fluticasone (FLONASE) 50 MCG/ACT nasal spray 2 spray  2 spray Each Nostril Daily Kaitlin Adamson APRN - CNP   2 spray at 12/29/23 1329    [Held by provider] rosuvastatin (CRESTOR) tablet 10 mg  10 mg Oral QPM Kaitlin Adamson APRN - CNP   10 mg at 12/27/23 0150    insulin lispro (HUMALOG) injection vial 0-8 Units  0-8 Units SubCUTAneous TID WC Kaitlin Adamson APRN - CNP        insulin lispro (HUMALOG) injection vial 0-4 Units  0-4 Units SubCUTAneous Nightly Kaitlin Adamson APRN - CNP        glucose chewable tablet 16 g  4 tablet Oral PRN Kaitlin Adamson APRN - CNP        dextrose bolus 10% 125 mL

## 2023-12-29 NOTE — PROGRESS NOTES
[facility]  Nephrology Consult Note    Patient's Name: Cain Bennett    Reason for Consult: MICHELLE    Chief Complaint: Abnormal labs  History Obtained From:  patient, past medical records, and EMR    History of Present Illness:    Cain Bennett is a 63 y.o. male with a past medical history of diabetes mellitus, hypertension, sleep apnea, and hyperlipidemia.  He presented to the ED on 12/26 for abnormal labs, he was also reportedly having chills and fatigue.  Vital signs on 12/26 includes temperature 97.3, pulse 76, BP 97/53, and he was 99% SpO2.  Lab data on 12/26 includes sodium 121, BUN 82, CO2 14, creatinine 8.1, anion gap 25, calcium 7.7, and hemoglobin 12.8.  His lab data was repeated on 12/26 and displayed a BUN of 143 and a creatinine of 8.5.  He had a CT of the abdomen pelvis on 12/26 which showed diffuse mucosal thickening of the duodenum and proximal jejunum, hepatomegaly, diverticulosis, kidneys without hydronephrosis.  Nephrology was consulted to see the patient for MICHELLE.  He has a baseline creatinine of 1.1-1.2.  At present, patient was seen and examined.  He reports that he has been experiencing congestion.  He explained that his appetite has been less than normal.  He denies any chest pain or shortness of breath.  He denies any diarrhea or sore throat.  He denies any dizziness.  He denies any dysuria.  He does not wear oxygen at home.  He denies use of NSAIDs.    12/28: pt seen in room, continues wit hiccups and nasal congestion, headache, on 1/2ms with 75 hco3 at 100, cardiology seeing for afib. Wife present. She says he took 2 courses of augmentin and 10 days of bactrim pta. Also was taking motrin  6 for several weeks. Says he saw dr bae from ent on either 12/7 or 12/14 and was told his sinuses are open. He was having a lot of diarrhea at home. He is eating ok today. She says he never took baclofen or levaquin. She can't remember if she picked up the prescriptions.     12/29: pt seen and  Range Status   12/27/2023 486 ng/mL Final     Comment:           FERRITIN Reference Ranges:  Adult Males   20 - 60 years:    30 - 400 ng/mL  Adult females 16 - 61 years:    15 - 150 ng/mL  Adults greater than 60 years:   no established reference range  Pediatrics:  no established reference range       Iron   Date Value Ref Range Status   12/27/2023 91 59 - 158 ug/dL Final     TIBC   Date Value Ref Range Status   12/27/2023 265 250 - 450 ug/dL Final       Vitamin B-12   Date Value Ref Range Status   12/27/2023 524 211 - 946 pg/mL Final       Folate   Date Value Ref Range Status   12/27/2023 11.5 4.8 - 24.2 ng/mL Final       Lab Results   Component Value Date/Time    COLORU Yellow 12/26/2023 05:15 PM    NITRU NEGATIVE 12/26/2023 05:15 PM    GLUCOSEU NEGATIVE 12/26/2023 05:15 PM    KETUA NEGATIVE 12/26/2023 05:15 PM    UROBILINOGEN 0.2 12/26/2023 05:15 PM    BILIRUBINUR SMALL 12/26/2023 05:15 PM       Lab Results   Component Value Date/Time    CHANTAL 39 12/27/2023 12:41 PM    OSMOU 343 12/27/2023 12:41 PM       No components found for: \"URIC\"    No results found for: \"LIPIDPAN\"    Assessment and Plans:    MICHELLE stage III  MICHELLE presumed likely in the setting of decreased effective renal perfusion with decreased oral intake/diarrhea /intermittent hypotension and exacerbated by outpatient fosinopril-HCTZ and NSAIDS and bactrim  Baseline creatinine 1.1-1.2  Creatinine peaked at 8.5 with  on 12/26-->  and creatinine 8 > 133/5/8>2.8  CT abdomen 12/26 no hydronephrosis  UA specific gravity 1.025, 1+ bacteria  Fena <1  P/c ratio 25/88  Check FERNANDA, C3, C4, SPEP, UPEP  Bladder scan  Avoid nephrotoxins/NSAIDs  Strict I&O, daily weights  On IV fluids  Monitor labs  Peripheral eos neg (was on augmentin. Order urine eos)  Uo 3 L    2. Hypocalcemia  With hypoalbuminemia  Low vit d 11.5  Supplement calcium prn  Monitor labs  Pth 194    3.   High anion gap metabolic acidosis  Likely in setting of MICHELLE  On metformin as an

## 2023-12-30 LAB
ALBUMIN SERPL-MCNC: 2.9 G/DL (ref 3.5–5.2)
ALP SERPL-CCNC: 128 U/L (ref 40–129)
ALT SERPL-CCNC: 59 U/L (ref 0–40)
ANION GAP SERPL CALCULATED.3IONS-SCNC: 10 MMOL/L (ref 7–16)
AST SERPL-CCNC: 34 U/L (ref 0–39)
BASOPHILS # BLD: 0.03 K/UL (ref 0–0.2)
BASOPHILS NFR BLD: 0 % (ref 0–2)
BILIRUB SERPL-MCNC: 0.4 MG/DL (ref 0–1.2)
BUN SERPL-MCNC: 56 MG/DL (ref 6–23)
CA-I BLD-SCNC: 1.1 MMOL/L (ref 1.15–1.33)
CALCIUM SERPL-MCNC: 8.2 MG/DL (ref 8.6–10.2)
CHLORIDE SERPL-SCNC: 101 MMOL/L (ref 98–107)
CO2 SERPL-SCNC: 23 MMOL/L (ref 22–29)
CREAT SERPL-MCNC: 1.5 MG/DL (ref 0.7–1.2)
EOSINOPHIL # BLD: 0.54 K/UL (ref 0.05–0.5)
EOSINOPHILS RELATIVE PERCENT: 6 % (ref 0–6)
ERYTHROCYTE [DISTWIDTH] IN BLOOD BY AUTOMATED COUNT: 14.4 % (ref 11.5–15)
GFR SERPL CREATININE-BSD FRML MDRD: 52 ML/MIN/1.73M2
GLUCOSE BLD-MCNC: 108 MG/DL (ref 74–99)
GLUCOSE BLD-MCNC: 133 MG/DL (ref 74–99)
GLUCOSE BLD-MCNC: 168 MG/DL (ref 74–99)
GLUCOSE BLD-MCNC: 182 MG/DL (ref 74–99)
GLUCOSE SERPL-MCNC: 108 MG/DL (ref 74–99)
HCT VFR BLD AUTO: 27.8 % (ref 37–54)
HGB BLD-MCNC: 9.5 G/DL (ref 12.5–16.5)
IMM GRANULOCYTES # BLD AUTO: 0.3 K/UL (ref 0–0.58)
IMM GRANULOCYTES NFR BLD: 3 % (ref 0–5)
LYMPHOCYTES NFR BLD: 1.71 K/UL (ref 1.5–4)
LYMPHOCYTES RELATIVE PERCENT: 19 % (ref 20–42)
MCH RBC QN AUTO: 31 PG (ref 26–35)
MCHC RBC AUTO-ENTMCNC: 34.2 G/DL (ref 32–34.5)
MCV RBC AUTO: 90.8 FL (ref 80–99.9)
MICROORGANISM SPEC CULT: NORMAL
MONOCYTES NFR BLD: 0.74 K/UL (ref 0.1–0.95)
MONOCYTES NFR BLD: 8 % (ref 2–12)
NEUTROPHILS NFR BLD: 63 % (ref 43–80)
NEUTS SEG NFR BLD: 5.6 K/UL (ref 1.8–7.3)
PLATELET # BLD AUTO: 298 K/UL (ref 130–450)
PMV BLD AUTO: 9.1 FL (ref 7–12)
POTASSIUM SERPL-SCNC: 3.6 MMOL/L (ref 3.5–5)
PROT SERPL-MCNC: 5.6 G/DL (ref 6.4–8.3)
RBC # BLD AUTO: 3.06 M/UL (ref 3.8–5.8)
SODIUM SERPL-SCNC: 134 MMOL/L (ref 132–146)
SPECIMEN DESCRIPTION: NORMAL
WBC OTHER # BLD: 8.9 K/UL (ref 4.5–11.5)

## 2023-12-30 PROCEDURE — 6370000000 HC RX 637 (ALT 250 FOR IP): Performed by: INTERNAL MEDICINE

## 2023-12-30 PROCEDURE — A4216 STERILE WATER/SALINE, 10 ML: HCPCS | Performed by: STUDENT IN AN ORGANIZED HEALTH CARE EDUCATION/TRAINING PROGRAM

## 2023-12-30 PROCEDURE — 2580000003 HC RX 258: Performed by: NURSE PRACTITIONER

## 2023-12-30 PROCEDURE — 6370000000 HC RX 637 (ALT 250 FOR IP): Performed by: NURSE PRACTITIONER

## 2023-12-30 PROCEDURE — C9113 INJ PANTOPRAZOLE SODIUM, VIA: HCPCS | Performed by: STUDENT IN AN ORGANIZED HEALTH CARE EDUCATION/TRAINING PROGRAM

## 2023-12-30 PROCEDURE — 99233 SBSQ HOSP IP/OBS HIGH 50: CPT | Performed by: INTERNAL MEDICINE

## 2023-12-30 PROCEDURE — 2580000003 HC RX 258: Performed by: STUDENT IN AN ORGANIZED HEALTH CARE EDUCATION/TRAINING PROGRAM

## 2023-12-30 PROCEDURE — 82962 GLUCOSE BLOOD TEST: CPT

## 2023-12-30 PROCEDURE — 2060000000 HC ICU INTERMEDIATE R&B

## 2023-12-30 PROCEDURE — 6370000000 HC RX 637 (ALT 250 FOR IP): Performed by: STUDENT IN AN ORGANIZED HEALTH CARE EDUCATION/TRAINING PROGRAM

## 2023-12-30 PROCEDURE — 6360000002 HC RX W HCPCS: Performed by: STUDENT IN AN ORGANIZED HEALTH CARE EDUCATION/TRAINING PROGRAM

## 2023-12-30 PROCEDURE — 6370000000 HC RX 637 (ALT 250 FOR IP)

## 2023-12-30 PROCEDURE — 6370000000 HC RX 637 (ALT 250 FOR IP): Performed by: PHYSICIAN ASSISTANT

## 2023-12-30 PROCEDURE — 82330 ASSAY OF CALCIUM: CPT

## 2023-12-30 PROCEDURE — 2500000003 HC RX 250 WO HCPCS: Performed by: STUDENT IN AN ORGANIZED HEALTH CARE EDUCATION/TRAINING PROGRAM

## 2023-12-30 PROCEDURE — 80053 COMPREHEN METABOLIC PANEL: CPT

## 2023-12-30 PROCEDURE — 85025 COMPLETE CBC W/AUTO DIFF WBC: CPT

## 2023-12-30 RX ORDER — CHLORPROMAZINE HYDROCHLORIDE 25 MG/1
25 TABLET, FILM COATED ORAL EVERY 6 HOURS PRN
Status: DISCONTINUED | OUTPATIENT
Start: 2023-12-30 | End: 2023-12-31 | Stop reason: HOSPADM

## 2023-12-30 RX ADMIN — SIMETHICONE 80 MG: 80 TABLET, CHEWABLE ORAL at 16:43

## 2023-12-30 RX ADMIN — SUCRALFATE 1 G: 1 TABLET ORAL at 11:27

## 2023-12-30 RX ADMIN — SODIUM CHLORIDE, PRESERVATIVE FREE 40 MG: 5 INJECTION INTRAVENOUS at 20:40

## 2023-12-30 RX ADMIN — DOXYCYCLINE 100 MG: 100 INJECTION, POWDER, LYOPHILIZED, FOR SOLUTION INTRAVENOUS at 15:55

## 2023-12-30 RX ADMIN — DICYCLOMINE HYDROCHLORIDE 20 MG: 10 CAPSULE ORAL at 15:51

## 2023-12-30 RX ADMIN — AMIODARONE HYDROCHLORIDE 200 MG: 200 TABLET ORAL at 08:18

## 2023-12-30 RX ADMIN — SODIUM CHLORIDE, PRESERVATIVE FREE 10 ML: 5 INJECTION INTRAVENOUS at 08:19

## 2023-12-30 RX ADMIN — METOPROLOL TARTRATE 25 MG: 25 TABLET, FILM COATED ORAL at 20:40

## 2023-12-30 RX ADMIN — SUCRALFATE 1 G: 1 TABLET ORAL at 20:41

## 2023-12-30 RX ADMIN — SIMETHICONE 80 MG: 80 TABLET, CHEWABLE ORAL at 13:16

## 2023-12-30 RX ADMIN — SODIUM CHLORIDE, PRESERVATIVE FREE 10 ML: 5 INJECTION INTRAVENOUS at 11:27

## 2023-12-30 RX ADMIN — SODIUM BICARBONATE 1300 MG: 650 TABLET ORAL at 20:40

## 2023-12-30 RX ADMIN — DOXYCYCLINE 100 MG: 100 INJECTION, POWDER, LYOPHILIZED, FOR SOLUTION INTRAVENOUS at 04:40

## 2023-12-30 RX ADMIN — APIXABAN 5 MG: 5 TABLET, FILM COATED ORAL at 08:17

## 2023-12-30 RX ADMIN — SODIUM BICARBONATE 1300 MG: 650 TABLET ORAL at 13:16

## 2023-12-30 RX ADMIN — SODIUM BICARBONATE 1300 MG: 650 TABLET ORAL at 08:17

## 2023-12-30 RX ADMIN — SIMETHICONE 80 MG: 80 TABLET, CHEWABLE ORAL at 20:40

## 2023-12-30 RX ADMIN — SIMETHICONE 80 MG: 80 TABLET, CHEWABLE ORAL at 08:17

## 2023-12-30 RX ADMIN — APIXABAN 5 MG: 5 TABLET, FILM COATED ORAL at 20:40

## 2023-12-30 RX ADMIN — SUCRALFATE 1 G: 1 TABLET ORAL at 16:43

## 2023-12-30 RX ADMIN — SODIUM CHLORIDE, PRESERVATIVE FREE 40 MG: 5 INJECTION INTRAVENOUS at 08:17

## 2023-12-30 RX ADMIN — Medication 1000 UNITS: at 08:17

## 2023-12-30 RX ADMIN — FLUTICASONE PROPIONATE 2 SPRAY: 50 SPRAY, METERED NASAL at 08:19

## 2023-12-30 RX ADMIN — ROSUVASTATIN 10 MG: 10 TABLET, FILM COATED ORAL at 17:53

## 2023-12-30 RX ADMIN — DICYCLOMINE HYDROCHLORIDE 20 MG: 10 CAPSULE ORAL at 10:42

## 2023-12-30 RX ADMIN — ALLOPURINOL 100 MG: 100 TABLET ORAL at 08:17

## 2023-12-30 RX ADMIN — METOPROLOL TARTRATE 25 MG: 25 TABLET, FILM COATED ORAL at 08:17

## 2023-12-30 RX ADMIN — FENOFIBRATE 160 MG: 160 TABLET ORAL at 08:18

## 2023-12-30 RX ADMIN — SUCRALFATE 1 G: 1 TABLET ORAL at 08:17

## 2023-12-30 ASSESSMENT — PAIN SCALES - GENERAL
PAINLEVEL_OUTOF10: 0

## 2023-12-30 NOTE — PLAN OF CARE
Problem: Discharge Planning  Goal: Discharge to home or other facility with appropriate resources  Outcome: Progressing     Problem: Safety - Adult  Goal: Free from fall injury  Outcome: Progressing     Problem: Metabolic/Fluid and Electrolytes - Adult  Goal: Electrolytes maintained within normal limits  Outcome: Progressing  Goal: Glucose maintained within prescribed range  Outcome: Progressing     Problem: Chronic Conditions and Co-morbidities  Goal: Patient's chronic conditions and co-morbidity symptoms are monitored and maintained or improved  Outcome: Progressing

## 2023-12-30 NOTE — PROGRESS NOTES
INPATIENT CARDIOLOGY FOLLOW-UP    Name: Cain Bennett    Age: 63 y.o.    Date of Admission: 12/26/2023  4:27 PM    Date of Service: 12/30/2023    Chief Complaint: Follow-up for A-fib     Interim History:  No new overnight cardiac complaints. Still has intermittent hiccups. Currently with no complaints of CP, SOB, palpitations, dizziness, or lightheadedness. AF on telemetry.    Review of Systems:   Cardiac: As per HPI  General: No fever, chills  Pulmonary: As per HPI  HEENT: No visual disturbances, difficult swallowing  GI: No nausea, vomiting  Endocrine: No thyroid disease or DM  Musculoskeletal: CHAVEZ x 4, no focal motor deficits  Skin: Intact, no rashes  Neuro/Psych: No headache or seizures    Problem List:  Patient Active Problem List   Diagnosis    Hypertension    Heart valve disease    Hyperlipidemia    MICHELLE (acute kidney injury) (HCC)    Diabetes mellitus (HCC)    UTI (urinary tract infection)       Allergies:  No Known Allergies    Current Medications:  Current Facility-Administered Medications   Medication Dose Route Frequency Provider Last Rate Last Admin    simethicone (MYLICON) chewable tablet 80 mg  80 mg Oral 4x Daily Duane Scales MD   80 mg at 12/30/23 1316    dicyclomine (BENTYL) capsule 20 mg  20 mg Oral 4x Daily PRN Duane Scales MD   20 mg at 12/30/23 1042    metoprolol tartrate (LOPRESSOR) tablet 25 mg  25 mg Oral BID Aguilar Ram MD   25 mg at 12/30/23 0817    apixaban (ELIQUIS) tablet 5 mg  5 mg Oral BID Aguilar Ram MD   5 mg at 12/30/23 0817    lidocaine PF 1 % injection 5 mL  5 mL IntraDERmal Once Duane Scales MD        sodium chloride flush 0.9 % injection 5-40 mL  5-40 mL IntraVENous 2 times per day Duane Scales MD   10 mL at 12/30/23 0819    sodium chloride flush 0.9 % injection 5-40 mL  5-40 mL IntraVENous PRN Duane Scales MD        0.9 % sodium chloride infusion   IntraVENous PRN Duane Scales MD        heparin (PF) 100 UNIT/ML injection 300 Units  3 mL  No results found for: \"CHOLHDLRATIO\"    Cardiac Tests:  Telemetry findings reviewed: AF at rate 80s, no new tachy/bradyarrhythmias overnight     EKG: Atrial fibrillation/flutter with variable block, septal infarct age undetermined, abnormal EKG. Vitals and labs were reviewed: As above blood pressure 110/50 heart rate 78 sats 94% on CPAP     Labs-sodium 121 >>127>>134, BUN/creatinine 126/81>> 133/5.8>>96/2.8>>56/1.5, magnesium 2.5, AST/ALT 50/75, hemoglobin 10.4    TTE- 12/30/23:    Left Ventricle: Normal left ventricular systolic function with a visually estimated EF of 60 - 65%. Atrail fibrillation may affect the evaluation of LV systolic function. Left ventricle size is normal. Mild posterior thickening. Moderately increased ventricular mass. Findings consistent with moderate concentric hypertrophy. Normal wall motion. Right Ventricle: Right ventricle size is normal. Normal systolic function. TAPSE is 2.1 cm. Aortic Valve: Mild regurgitation. No stenosis. Mitral Valve: Mild regurgitation. Tricuspid Valve: Mild regurgitation. Normal RVSP. The estimated RVSP is 27 mmHg. Right Atrium: Right atrium is mildly dilated. Pericardium: No pericardial effusion. Image quality is adequate. Assessment:  New onset AF with RVR>> rate controlled. [id number] score-2  Hypertension, well controlled, /55  Hyperlipidemia, on crestor, currently on hold due to high LFTs  Abnormal liver function secondary to NSAID use, improving and close to normal   Type 2 diabetes  MICHELLE secondary to NSAID use and decreased oral intake, improving, creat 8.1>>5.8>>2.8>>1.5  Non coronavirus upper respiratory infection with bacterial sinusitis. Mild hypokalemia, K 4.2>>3.3 Mild anemia, Hb 10.1>>9.5  JINA on C-pap, compliant with C-pap use              Plan:   Continue amiodarone to 200 mg po for rate control for short term use only. Continue metoprolol 25 mg po bid for rate control.    BLAINE guided cardioversion on

## 2023-12-30 NOTE — PROGRESS NOTES
[facility]  Nephrology Consult Note    Patient's Name: Indira Ambrocio    Reason for Consult: MICHELLE    Chief Complaint: Abnormal labs  History Obtained From:  patient, past medical records, and EMR    History of Present Illness:    Indira Ambrocio is a 58 y.o. male with a past medical history of diabetes mellitus, hypertension, sleep apnea, and hyperlipidemia. He presented to the ED on 12/26 for abnormal labs, he was also reportedly having chills and fatigue. Vital signs on 12/26 includes temperature 97.3, pulse 76, BP 97/53, and he was 99% SpO2. Lab data on 12/26 includes sodium 121, BUN 82, CO2 14, creatinine 8.1, anion gap 25, calcium 7.7, and hemoglobin 12.8. His lab data was repeated on 12/26 and displayed a BUN of 143 and a creatinine of 8.5. He had a CT of the abdomen pelvis on 12/26 which showed diffuse mucosal thickening of the duodenum and proximal jejunum, hepatomegaly, diverticulosis, kidneys without hydronephrosis. Nephrology was consulted to see the patient for MICHELLE. He has a baseline creatinine of 1.1-1.2. At present, patient was seen and examined. He reports that he has been experiencing congestion. He explained that his appetite has been less than normal.  He denies any chest pain or shortness of breath. He denies any diarrhea or sore throat. He denies any dizziness. He denies any dysuria. He does not wear oxygen at home. He denies use of NSAIDs. 12/28: pt seen in room, continues wit hiccups and nasal congestion, headache, on 1/2ms with 75 hco3 at 100, cardiology seeing for afib. Wife present. She says he took 2 courses of augmentin and 10 days of bactrim pta. Also was taking motrin  6 for several weeks. Says he saw dr Radha Bo from ent on either 12/7 or 12/14 and was told his sinuses are open. He was having a lot of diarrhea at home. He is eating ok today. She says he never took baclofen or levaquin. She can't remember if she picked up the prescriptions.      12/29: pt seen and Axillary 76 18 96 % -- --   12/30/23 0745 113/68 97.9 °F (36.6 °C) Axillary 93 18 96 % -- --   12/30/23 0420 (!) 101/48 97.8 °F (36.6 °C) Axillary 76 18 98 % -- --   12/30/23 0000 (!) 109/56 98.1 °F (36.7 °C) Axillary 83 18 93 % -- --   12/29/23 2010 130/87 98.9 °F (37.2 °C) Axillary 81 18 98 % -- --   12/29/23 1641 (!) 104/45 97.9 °F (36.6 °C) Axillary 84 18 96 % -- --   12/29/23 1331 112/68 -- -- 91 -- -- -- --   12/29/23 1207 106/68 -- -- -- -- -- 1.829 m (6') 111.1 kg (245 lb)         Intake/Output Summary (Last 24 hours) at 12/30/2023 1156  Last data filed at 12/30/2023 1121  Gross per 24 hour   Intake 1764.9 ml   Output 2825 ml   Net -1060.1 ml     General: Awake, alert, no acute distress  Neck: No JVD noted  Lungs: Crackles bilaterally upper, diminished to the bases bilaterally.  Unlabored  CV: Regular rate and rhythm.  No rub  Abd: Soft, nontender, nondistended.  Active bowel sounds  Skin: Warm and dry.  No rash on exposed extremities  Ext: Trace BLE edema   Neuro: Awake, answers questions appropriately    Data:    Recent Labs     12/28/23 0437 12/29/23 0459 12/30/23  0540   WBC 10.9 11.3 8.9   HGB 10.4* 10.1* 9.5*   HCT 30.1* 28.4* 27.8*   MCV 91.5 89.0 90.8    258 298       Recent Labs     12/27/23 2045 12/28/23 0437 12/29/23 0459 12/30/23  0540   NA  --  127* 134 134   K  --  4.2 3.3* 3.6   CL  --  93* 100 101   CO2  --  11* 17* 23   CREATININE  --  5.8* 2.8* 1.5*   BUN  --  133* 96* 56*   LABGLOM  --  10* 24* 52*   GLUCOSE  --  106* 135* 108*   CALCIUM  --  7.1* 7.9* 8.2*   PHOS  --  6.9* 3.9  --    MG 2.2 2.5 2.4  --        Vit D, 25-Hydroxy   Date Value Ref Range Status   12/27/2023 11.5 (L) 30.0 - 100.0 ng/mL Final       No results found for: \"PTH\"    Recent Labs     12/28/23  0437 12/29/23  0459 12/30/23  0540   ALT 75* 62* 59*   AST 54* 34 34   ALKPHOS 182* 149* 128   BILITOT 0.4 0.5 0.4       Recent Labs     12/28/23  0437 12/29/23  0459 12/30/23  0540   LABALBU 3.1* 3.0* 2.9*

## 2023-12-30 NOTE — CONSULTS
[facility] ([facility])   Gastroenterology, Hepatology, &  Advanced Endoscopy    Consult       ASSESSMENT AND PLAN:    ***        HISTORY OF PRESENT ILLNESS:      ***    Past Medical History:        Diagnosis Date    Chronic sinusitis     disease - for surgical encounter 7/18/14    Diabetes mellitus ([address])     dx 10/13; type II    Environmental allergies     Heart murmur     Hyperlipidemia     Hypertension     stable per patient    Sleep apnea     uses c pap     Past Surgical History:        Procedure Laterality Date    COLONOSCOPY  10/13/11    COLONOSCOPY  11/09/2016    DR BARRAZA    ECHO COMPL W DOP COLOR FLOW  2/27/2013         NASAL SINUS SURGERY      times 4    SINUS ENDOSCOPY Bilateral 11/4/2022    LEFT ENDOSCOPIC MEDIAL ANTROSTOMY RIGHT MAXILLARY SINUSOTOMY WITH REMOVAL OF SOFT TISSUE BILATERALLY performed by Darinel Davey MD at [address] Bilateral 7/18/2014    TONSILLECTOMY       Social History:    TOBACCO:   reports that he has never smoked. He has never used smokeless tobacco.  ETOH:   reports no history of alcohol use. DRUGS:   reports no history of drug use. Family History:   History reviewed. No pertinent family history.       Current Facility-Administered Medications:     simethicone (MYLICON) chewable tablet 80 mg, 80 mg, Oral, 4x Daily, Brinda Goldman MD, 80 mg at 12/29/23 2129    dicyclomine (BENTYL) capsule 20 mg, 20 mg, Oral, 4x Daily PRN, Brinda Goldman MD, 20 mg at 12/29/23 1328    metoprolol tartrate (LOPRESSOR) tablet 25 mg, 25 mg, Oral, BID, hSan Subramanian MD, 25 mg at 12/29/23 2129    apixaban (ELIQUIS) tablet 5 mg, 5 mg, Oral, BID, Andrew BERNARD MD, 5 mg at 12/29/23 2130    lidocaine PF 1 % injection 5 mL, 5 mL, IntraDERmal, Once, Brinda Goldman MD    sodium chloride flush 0.9 % injection 5-40 mL, 5-40 mL, IntraVENous, 2 times per day, Brinda Goldman MD, 10 mL at 12/29/23 1039    sodium chloride flush 0.9 % injection 5-40 mL, 5-40 mL, IntraVENous, PRN, Brinda Goldman MD

## 2023-12-30 NOTE — PLAN OF CARE
Problem: Discharge Planning  Goal: Discharge to home or other facility with appropriate resources  12/29/2023 1919 by Maria Eugenia Olson RN  Outcome: Progressing  12/29/2023 0528 by Nereyda Taylor RN  Outcome: Progressing     Problem: Safety - Adult  Goal: Free from fall injury  12/29/2023 1919 by Maria Eugenia Olson RN  Outcome: Progressing  12/29/2023 0528 by Nereyda Taylor RN  Outcome: Progressing     Problem: Metabolic/Fluid and Electrolytes - Adult  Goal: Electrolytes maintained within normal limits  12/29/2023 1919 by Maria Eugenia Olson RN  Outcome: Progressing  12/29/2023 0528 by Nereyda Taylor RN  Outcome: Progressing  Goal: Hemodynamic stability and optimal renal function maintained  12/29/2023 0528 by Nereyda Taylor RN  Outcome: Progressing     Problem: Chronic Conditions and Co-morbidities  Goal: Patient's chronic conditions and co-morbidity symptoms are monitored and maintained or improved  Outcome: Progressing

## 2023-12-30 NOTE — PROGRESS NOTES
Internal Medicine Progress Note    Patient's name: Ketan Casanova  : 1960  Chief complaints (on day of admission): Other (Sent by [facility] for renal failure, ), Hiccups (X 4 days), Chills, and Eye Problem (Pain behind eyes)  Admission date: 2023  Date of service: 2023   Room: [address] PICU  Primary care physician: Elaine Saenz MD  Reason for visit: Follow-up for pneumonia MICHELLE     Subjective  Rose Mendoza was seen and examined at bedside     Issues with hiccups  Otherwise feeling ok  Wife by the bed side    Review of Systems  There are no new complaints of chest pain, shortness of breath, abdominal pain, nausea, vomiting, diarrhea, constipation unless otherwise mentioned above.      Hospital Medications  Current Facility-Administered Medications   Medication Dose Route Frequency Provider Last Rate Last Admin    simethicone (MYLICON) chewable tablet 80 mg  80 mg Oral 4x Daily Jaymie Ashley MD   80 mg at 23 1316    dicyclomine (BENTYL) capsule 20 mg  20 mg Oral 4x Daily PRN Jaymie Ashley MD   20 mg at 23 1042    metoprolol tartrate (LOPRESSOR) tablet 25 mg  25 mg Oral BID Hima Inman MD   25 mg at 23 0817    apixaban (ELIQUIS) tablet 5 mg  5 mg Oral BID Hima Inman MD   5 mg at 23 0817    lidocaine PF 1 % injection 5 mL  5 mL IntraDERmal Once Jaymie Ashley MD        sodium chloride flush 0.9 % injection 5-40 mL  5-40 mL IntraVENous 2 times per day Jaymie Ashley MD   10 mL at 23 0819    sodium chloride flush 0.9 % injection 5-40 mL  5-40 mL IntraVENous PRN Jaymie Ashley MD        0.9 % sodium chloride infusion   IntraVENous PRN Jaymie Ashley MD        heparin (PF) 100 UNIT/ML injection 300 Units  3 mL IntraVENous PRN Jaymie Ashley MD        allopurinol (ZYLOPRIM) tablet 100 mg  100 mg Oral Daily Shahram Lynne MD   100 mg at 23 0817    doxycycline (VIBRAMYCIN) 100 mg in sodium chloride 0.9 % 100 mL IVPB ([id number])  100 mg IntraVENous Q12H

## 2023-12-30 NOTE — PROGRESS NOTES
Patient's wife at patient bedside and inquiring if patient has yet been seen by gastroenterology. Per patient and his spouse he has had continuous hiccups for 7 days now. Patient states he believes he saw the gastroenterology doctor last night and thought a new medication was going to be ordered for him. Perfect serve message sent to Dr. Marisela Faith to confirm if patient was seen and if there should be a new order for something to help with his hiccups. Awaiting response. Message received back from Dr. Marisela Faith that he saw the patient yesterday and orders were placed through Dr. Jarrod Chilel for simethicone, PPI, and bentyl PRN. Notified that patient has been receiving PPI and simethicone with no relief yet, and received one does of bentyl yesterday. Will give PRN dose of bentyl now but inquiring when to reach out if hiccups continue to persist despite these medications. Per Dr. Marisela Faith \"It's a frustrating symptom but certainly does not require hospitalization. Can always consider a dose of Ativan to see if it responds. Can also see if Physical Therapy can do breathing exercises with him\".

## 2023-12-31 VITALS
HEIGHT: 72 IN | BODY MASS INDEX: 33.18 KG/M2 | OXYGEN SATURATION: 96 % | WEIGHT: 245 LBS | HEART RATE: 77 BPM | DIASTOLIC BLOOD PRESSURE: 59 MMHG | RESPIRATION RATE: 18 BRPM | SYSTOLIC BLOOD PRESSURE: 106 MMHG | TEMPERATURE: 98.3 F

## 2023-12-31 LAB
ALBUMIN SERPL-MCNC: 2.9 G/DL (ref 3.5–5.2)
ALP SERPL-CCNC: 109 U/L (ref 40–129)
ALT SERPL-CCNC: 54 U/L (ref 0–40)
ANION GAP SERPL CALCULATED.3IONS-SCNC: 12 MMOL/L (ref 7–16)
AST SERPL-CCNC: 33 U/L (ref 0–39)
BASOPHILS # BLD: 0.04 K/UL (ref 0–0.2)
BASOPHILS NFR BLD: 1 % (ref 0–2)
BILIRUB SERPL-MCNC: 0.3 MG/DL (ref 0–1.2)
BUN SERPL-MCNC: 36 MG/DL (ref 6–23)
CALCIUM SERPL-MCNC: 8.1 MG/DL (ref 8.6–10.2)
CHLORIDE SERPL-SCNC: 100 MMOL/L (ref 98–107)
CO2 SERPL-SCNC: 23 MMOL/L (ref 22–29)
CREAT SERPL-MCNC: 1.3 MG/DL (ref 0.7–1.2)
EOSINOPHIL # BLD: 0.69 K/UL (ref 0.05–0.5)
EOSINOPHILS RELATIVE PERCENT: 8 % (ref 0–6)
ERYTHROCYTE [DISTWIDTH] IN BLOOD BY AUTOMATED COUNT: 14.3 % (ref 11.5–15)
GFR SERPL CREATININE-BSD FRML MDRD: >60 ML/MIN/1.73M2
GLUCOSE BLD-MCNC: 126 MG/DL (ref 74–99)
GLUCOSE SERPL-MCNC: 107 MG/DL (ref 74–99)
HCT VFR BLD AUTO: 26.3 % (ref 37–54)
HGB BLD-MCNC: 8.8 G/DL (ref 12.5–16.5)
IMM GRANULOCYTES # BLD AUTO: 0.34 K/UL (ref 0–0.58)
IMM GRANULOCYTES NFR BLD: 4 % (ref 0–5)
LYMPHOCYTES NFR BLD: 2.09 K/UL (ref 1.5–4)
LYMPHOCYTES RELATIVE PERCENT: 25 % (ref 20–42)
MCH RBC QN AUTO: 31 PG (ref 26–35)
MCHC RBC AUTO-ENTMCNC: 33.5 G/DL (ref 32–34.5)
MCV RBC AUTO: 92.6 FL (ref 80–99.9)
MICROORGANISM SPEC CULT: NORMAL
MICROORGANISM SPEC CULT: NORMAL
MONOCYTES NFR BLD: 0.78 K/UL (ref 0.1–0.95)
MONOCYTES NFR BLD: 9 % (ref 2–12)
NEUTROPHILS NFR BLD: 54 % (ref 43–80)
NEUTS SEG NFR BLD: 4.55 K/UL (ref 1.8–7.3)
PLATELET # BLD AUTO: 325 K/UL (ref 130–450)
PMV BLD AUTO: 8.8 FL (ref 7–12)
POTASSIUM SERPL-SCNC: 3.8 MMOL/L (ref 3.5–5)
PROT SERPL-MCNC: 5.5 G/DL (ref 6.4–8.3)
RBC # BLD AUTO: 2.84 M/UL (ref 3.8–5.8)
SERVICE CMNT-IMP: NORMAL
SERVICE CMNT-IMP: NORMAL
SODIUM SERPL-SCNC: 135 MMOL/L (ref 132–146)
SPECIMEN DESCRIPTION: NORMAL
SPECIMEN DESCRIPTION: NORMAL
WBC OTHER # BLD: 8.5 K/UL (ref 4.5–11.5)

## 2023-12-31 PROCEDURE — 99232 SBSQ HOSP IP/OBS MODERATE 35: CPT | Performed by: INTERNAL MEDICINE

## 2023-12-31 PROCEDURE — 2580000003 HC RX 258: Performed by: NURSE PRACTITIONER

## 2023-12-31 PROCEDURE — 85025 COMPLETE CBC W/AUTO DIFF WBC: CPT

## 2023-12-31 PROCEDURE — 6370000000 HC RX 637 (ALT 250 FOR IP): Performed by: INTERNAL MEDICINE

## 2023-12-31 PROCEDURE — C9113 INJ PANTOPRAZOLE SODIUM, VIA: HCPCS | Performed by: STUDENT IN AN ORGANIZED HEALTH CARE EDUCATION/TRAINING PROGRAM

## 2023-12-31 PROCEDURE — 6370000000 HC RX 637 (ALT 250 FOR IP): Performed by: STUDENT IN AN ORGANIZED HEALTH CARE EDUCATION/TRAINING PROGRAM

## 2023-12-31 PROCEDURE — 6370000000 HC RX 637 (ALT 250 FOR IP): Performed by: NURSE PRACTITIONER

## 2023-12-31 PROCEDURE — 6360000002 HC RX W HCPCS: Performed by: STUDENT IN AN ORGANIZED HEALTH CARE EDUCATION/TRAINING PROGRAM

## 2023-12-31 PROCEDURE — 6370000000 HC RX 637 (ALT 250 FOR IP)

## 2023-12-31 PROCEDURE — A4216 STERILE WATER/SALINE, 10 ML: HCPCS | Performed by: STUDENT IN AN ORGANIZED HEALTH CARE EDUCATION/TRAINING PROGRAM

## 2023-12-31 PROCEDURE — 80053 COMPREHEN METABOLIC PANEL: CPT

## 2023-12-31 PROCEDURE — 2500000003 HC RX 250 WO HCPCS: Performed by: STUDENT IN AN ORGANIZED HEALTH CARE EDUCATION/TRAINING PROGRAM

## 2023-12-31 PROCEDURE — 82962 GLUCOSE BLOOD TEST: CPT

## 2023-12-31 PROCEDURE — 2580000003 HC RX 258: Performed by: STUDENT IN AN ORGANIZED HEALTH CARE EDUCATION/TRAINING PROGRAM

## 2023-12-31 PROCEDURE — 6370000000 HC RX 637 (ALT 250 FOR IP): Performed by: PHYSICIAN ASSISTANT

## 2023-12-31 RX ORDER — AMIODARONE HYDROCHLORIDE 200 MG/1
200 TABLET ORAL DAILY
Qty: 30 TABLET | Refills: 0 | Status: SHIPPED | OUTPATIENT
Start: 2024-01-01 | End: 2023-12-31

## 2023-12-31 RX ORDER — CHLORPROMAZINE HYDROCHLORIDE 25 MG/1
25 TABLET, FILM COATED ORAL EVERY 6 HOURS PRN
Qty: 30 TABLET | Refills: 0 | Status: SHIPPED | OUTPATIENT
Start: 2023-12-31 | End: 2023-12-31

## 2023-12-31 RX ORDER — DOXYCYCLINE HYCLATE 100 MG
100 TABLET ORAL 2 TIMES DAILY
Qty: 8 TABLET | Refills: 0 | Status: SHIPPED | OUTPATIENT
Start: 2023-12-31 | End: 2024-01-04

## 2023-12-31 RX ORDER — SUCRALFATE 1 G/1
1 TABLET ORAL
Qty: 120 TABLET | Refills: 3 | Status: SHIPPED | OUTPATIENT
Start: 2023-12-31

## 2023-12-31 RX ORDER — SODIUM BICARBONATE 650 MG/1
1300 TABLET ORAL 3 TIMES DAILY
Qty: 120 TABLET | Refills: 0 | Status: SHIPPED | OUTPATIENT
Start: 2023-12-31 | End: 2023-12-31

## 2023-12-31 RX ORDER — CHLORPROMAZINE HYDROCHLORIDE 25 MG/1
25 TABLET, FILM COATED ORAL EVERY 6 HOURS PRN
Qty: 30 TABLET | Refills: 0 | Status: SHIPPED | OUTPATIENT
Start: 2023-12-31

## 2023-12-31 RX ORDER — ROSUVASTATIN CALCIUM 10 MG/1
10 TABLET, COATED ORAL EVERY EVENING
Qty: 30 TABLET | Refills: 3 | Status: SHIPPED | OUTPATIENT
Start: 2023-12-31

## 2023-12-31 RX ORDER — PANTOPRAZOLE SODIUM 40 MG/1
40 TABLET, DELAYED RELEASE ORAL
Qty: 30 TABLET | Refills: 5 | Status: SHIPPED | OUTPATIENT
Start: 2023-12-31

## 2023-12-31 RX ORDER — CHOLECALCIFEROL (VITAMIN D3) 25 MCG
1000 TABLET ORAL DAILY
Qty: 60 TABLET | Refills: 0 | Status: SHIPPED | OUTPATIENT
Start: 2024-01-01 | End: 2023-12-31

## 2023-12-31 RX ORDER — DOXYCYCLINE HYCLATE 100 MG
100 TABLET ORAL 2 TIMES DAILY
Qty: 8 TABLET | Refills: 0 | Status: SHIPPED | OUTPATIENT
Start: 2023-12-31 | End: 2023-12-31

## 2023-12-31 RX ORDER — SUCRALFATE 1 G/1
1 TABLET ORAL
Qty: 120 TABLET | Refills: 3 | Status: SHIPPED | OUTPATIENT
Start: 2023-12-31 | End: 2023-12-31

## 2023-12-31 RX ORDER — ALLOPURINOL 100 MG/1
100 TABLET ORAL DAILY
Qty: 30 TABLET | Refills: 3 | Status: SHIPPED | OUTPATIENT
Start: 2024-01-01

## 2023-12-31 RX ORDER — SODIUM BICARBONATE 650 MG/1
1300 TABLET ORAL 3 TIMES DAILY
Qty: 120 TABLET | Refills: 0 | Status: SHIPPED | OUTPATIENT
Start: 2023-12-31

## 2023-12-31 RX ORDER — PANTOPRAZOLE SODIUM 40 MG/1
40 TABLET, DELAYED RELEASE ORAL
Qty: 30 TABLET | Refills: 5 | Status: SHIPPED | OUTPATIENT
Start: 2023-12-31 | End: 2023-12-31

## 2023-12-31 RX ORDER — CHOLECALCIFEROL (VITAMIN D3) 25 MCG
1000 TABLET ORAL DAILY
Qty: 60 TABLET | Refills: 0 | Status: SHIPPED | OUTPATIENT
Start: 2024-01-01

## 2023-12-31 RX ORDER — ROSUVASTATIN CALCIUM 10 MG/1
10 TABLET, COATED ORAL EVERY EVENING
Qty: 30 TABLET | Refills: 3 | Status: SHIPPED | OUTPATIENT
Start: 2023-12-31 | End: 2023-12-31

## 2023-12-31 RX ORDER — AMIODARONE HYDROCHLORIDE 200 MG/1
200 TABLET ORAL DAILY
Qty: 30 TABLET | Refills: 0 | Status: SHIPPED | OUTPATIENT
Start: 2024-01-01

## 2023-12-31 RX ORDER — ALLOPURINOL 100 MG/1
100 TABLET ORAL DAILY
Qty: 30 TABLET | Refills: 3 | Status: SHIPPED | OUTPATIENT
Start: 2024-01-01 | End: 2023-12-31

## 2023-12-31 RX ADMIN — SODIUM BICARBONATE 1300 MG: 650 TABLET ORAL at 08:05

## 2023-12-31 RX ADMIN — SODIUM CHLORIDE, PRESERVATIVE FREE 40 MG: 5 INJECTION INTRAVENOUS at 08:10

## 2023-12-31 RX ADMIN — ALLOPURINOL 100 MG: 100 TABLET ORAL at 08:05

## 2023-12-31 RX ADMIN — AMIODARONE HYDROCHLORIDE 200 MG: 200 TABLET ORAL at 08:04

## 2023-12-31 RX ADMIN — DOXYCYCLINE 100 MG: 100 INJECTION, POWDER, LYOPHILIZED, FOR SOLUTION INTRAVENOUS at 03:29

## 2023-12-31 RX ADMIN — METOPROLOL TARTRATE 25 MG: 25 TABLET, FILM COATED ORAL at 08:04

## 2023-12-31 RX ADMIN — SODIUM CHLORIDE, PRESERVATIVE FREE 10 ML: 5 INJECTION INTRAVENOUS at 08:05

## 2023-12-31 RX ADMIN — APIXABAN 5 MG: 5 TABLET, FILM COATED ORAL at 08:05

## 2023-12-31 RX ADMIN — SIMETHICONE 80 MG: 80 TABLET, CHEWABLE ORAL at 08:05

## 2023-12-31 RX ADMIN — FLUTICASONE PROPIONATE 2 SPRAY: 50 SPRAY, METERED NASAL at 08:06

## 2023-12-31 RX ADMIN — CHLORPROMAZINE HYDROCHLORIDE 25 MG: 25 TABLET, FILM COATED ORAL at 00:05

## 2023-12-31 RX ADMIN — SODIUM CHLORIDE, PRESERVATIVE FREE 10 ML: 5 INJECTION INTRAVENOUS at 08:10

## 2023-12-31 RX ADMIN — FENOFIBRATE 160 MG: 160 TABLET ORAL at 08:05

## 2023-12-31 RX ADMIN — CHLORPROMAZINE HYDROCHLORIDE 25 MG: 25 TABLET, FILM COATED ORAL at 08:05

## 2023-12-31 RX ADMIN — SUCRALFATE 1 G: 1 TABLET ORAL at 08:04

## 2023-12-31 RX ADMIN — Medication 1000 UNITS: at 08:05

## 2023-12-31 ASSESSMENT — PAIN SCALES - GENERAL
PAINLEVEL_OUTOF10: 0

## 2023-12-31 NOTE — PROGRESS NOTES
Internal Medicine Progress Note    Patient's name: Cain Bennett  : 1960  Chief complaints (on day of admission): Other (Sent by [facility] for renal failure, ), Hiccups (X 4 days), Chills, and Eye Problem (Pain behind eyes)  Admission date: 2023  Date of service: 2023   Room: [address] PICU  Primary care physician: Reza Limon MD  Reason for visit: Follow-up for pneumonia MICHELLE     Subjective  Cain was seen and examined at bedside     Hiccups better with thorazine last pm but now worse    Review of Systems  There are no new complaints of chest pain, shortness of breath, abdominal pain, nausea, vomiting, diarrhea, constipation unless otherwise mentioned above.     Hospital Medications  Current Facility-Administered Medications   Medication Dose Route Frequency Provider Last Rate Last Admin    chlorproMAZINE (THORAZINE) tablet 25 mg  25 mg Oral Q6H PRN Sugar Rimma A, APRN - CNP   25 mg at 23 0805    simethicone (MYLICON) chewable tablet 80 mg  80 mg Oral 4x Daily Duane Scales MD   80 mg at 23 0805    dicyclomine (BENTYL) capsule 20 mg  20 mg Oral 4x Daily PRN Duane Scales MD   20 mg at 23 1551    metoprolol tartrate (LOPRESSOR) tablet 25 mg  25 mg Oral BID Aguilar Ram MD   25 mg at 23 0804    apixaban (ELIQUIS) tablet 5 mg  5 mg Oral BID Aguilar Ram MD   5 mg at 23 0805    lidocaine PF 1 % injection 5 mL  5 mL IntraDERmal Once Duane Scales MD        sodium chloride flush 0.9 % injection 5-40 mL  5-40 mL IntraVENous 2 times per day Duane Scales MD   10 mL at 23 0810    sodium chloride flush 0.9 % injection 5-40 mL  5-40 mL IntraVENous PRN Duane Scales MD        0.9 % sodium chloride infusion   IntraVENous PRN Duane Scales MD        heparin (PF) 100 UNIT/ML injection 300 Units  3 mL IntraVENous PRN Duane Scales MD        allopurinol (ZYLOPRIM) tablet 100 mg  100 mg Oral Daily Jackson Majano MD   100 mg at 23 0805

## 2023-12-31 NOTE — PROGRESS NOTES
Nursing requested dc be done before noon  Patient was seen and dc med rec done  Message sent after dc to change pharmacy to another pharmacy  I did notify nurse supervisor franklyn regarding this recurrent issue

## 2023-12-31 NOTE — DISCHARGE INSTRUCTIONS
Your information:  Name: Cain Bennett  : 1960    Your instructions:    Follow up with your physicians.  Dr. Ram wants you to get blood work - CBC completed on 2023.   Call to schedule your outpatient cardioversion.     What to do after you leave the hospital:    Recommended diet: diabetic diet    Recommended activity: activity as tolerated        The following personal items were collected during your admission and were returned to you:    Belongings  Dental Appliances: None  Vision - Corrective Lenses: Eyeglasses  Hearing Aid: None  Clothing: Shirt, Pants, Undergarments, Footwear  Jewelry: None  Electronic Devices: Cell Phone  Weapons (Notify Protective Services/Security): None  Other Valuables: Suitcase  Home Medications: None  Valuables Given To: Patient  Provide Name(s) of Who Valuable(s) Were Given To: na    Information obtained by:  By signing below, I understand that if any problems occur once I leave the hospital I am to contact my primary care provider.  I understand and acknowledge receipt of the instructions indicated above.

## 2023-12-31 NOTE — PROGRESS NOTES
Spoke with Dr. Ram at bedside this morning regarding patient inquiring about possible discharge today. Per Dr. Ram he would be okay with discharge today should patient choose to instead have cardioversion done outpatient instead of cardioversion planned for Tuesday. He asked that we stress to patient and family that outpatient cardioversion would possibly take up to a month to schedule outpatient. Patient notified who stated he would speak with his wife.

## 2023-12-31 NOTE — PROGRESS NOTES
[facility]  Nephrology Consult Note    Patient's Name: Cain Bennett    Reason for Consult: MICHELLE    Chief Complaint: Abnormal labs  History Obtained From:  patient, past medical records, and EMR    History of Present Illness:    Cain Bennett is a 63 y.o. male with a past medical history of diabetes mellitus, hypertension, sleep apnea, and hyperlipidemia.  He presented to the ED on 12/26 for abnormal labs, he was also reportedly having chills and fatigue.  Vital signs on 12/26 includes temperature 97.3, pulse 76, BP 97/53, and he was 99% SpO2.  Lab data on 12/26 includes sodium 121, BUN 82, CO2 14, creatinine 8.1, anion gap 25, calcium 7.7, and hemoglobin 12.8.  His lab data was repeated on 12/26 and displayed a BUN of 143 and a creatinine of 8.5.  He had a CT of the abdomen pelvis on 12/26 which showed diffuse mucosal thickening of the duodenum and proximal jejunum, hepatomegaly, diverticulosis, kidneys without hydronephrosis.  Nephrology was consulted to see the patient for MICHELLE.  He has a baseline creatinine of 1.1-1.2.  At present, patient was seen and examined.  He reports that he has been experiencing congestion.  He explained that his appetite has been less than normal.  He denies any chest pain or shortness of breath.  He denies any diarrhea or sore throat.  He denies any dizziness.  He denies any dysuria.  He does not wear oxygen at home.  He denies use of NSAIDs.    12/28: pt seen in room, continues wit hiccups and nasal congestion, headache, on 1/2ms with 75 hco3 at 100, cardiology seeing for afib. Wife present. She says he took 2 courses of augmentin and 10 days of bactrim pta. Also was taking motrin  6 for several weeks. Says he saw dr bae from ent on either 12/7 or 12/14 and was told his sinuses are open. He was having a lot of diarrhea at home. He is eating ok today. She says he never took baclofen or levaquin. She can't remember if she picked up the prescriptions.     12/29: pt seen and

## 2023-12-31 NOTE — PROGRESS NOTES
Perfect serve message sent to Dr. English to inquire about discharge orders for patient today. Patient and his spouse currently at bedside would like to D/C today and already spoke with Dr. Ram this morning and have chosen to have cardioversion outpatient. Notified that patient and his wife are hoping to leave by noon. Cardiology and nephrology okay with discharge.

## 2023-12-31 NOTE — PROGRESS NOTES
INPATIENT CARDIOLOGY FOLLOW-UP    Name: Cain Bennett    Age: 63 y.o.    Date of Admission: 12/26/2023  4:27 PM    Date of Service: 12/31/2023    Chief Complaint: Follow-up for A-fib     Interim History:  No new overnight cardiac complaints. Still has intermittent hiccups and felt better with thorazine. Patient wants to go home and get cardioversion as an OP.  Currently with no complaints of CP, SOB, palpitations, dizziness, or lightheadedness. AF on telemetry.    Review of Systems:   Cardiac: As per HPI  General: No fever, chills  Pulmonary: As per HPI  HEENT: No visual disturbances, difficult swallowing  GI: No nausea, vomiting  Endocrine: No thyroid disease or DM  Musculoskeletal: CHAVEZ x 4, no focal motor deficits  Skin: Intact, no rashes  Neuro/Psych: No headache or seizures    Problem List:  Patient Active Problem List   Diagnosis    Hypertension    Heart valve disease    Hyperlipidemia    MICHELLE (acute kidney injury) (HCC)    Diabetes mellitus (HCC)    UTI (urinary tract infection)       Allergies:  No Known Allergies    Current Medications:  Current Facility-Administered Medications   Medication Dose Route Frequency Provider Last Rate Last Admin    chlorproMAZINE (THORAZINE) tablet 25 mg  25 mg Oral Q6H PRN Sugar, Rimma A, APRN - CNP   25 mg at 12/31/23 0805    simethicone (MYLICON) chewable tablet 80 mg  80 mg Oral 4x Daily Duane Scales MD   80 mg at 12/31/23 0805    dicyclomine (BENTYL) capsule 20 mg  20 mg Oral 4x Daily PRN Duane Scales MD   20 mg at 12/30/23 1551    metoprolol tartrate (LOPRESSOR) tablet 25 mg  25 mg Oral BID Aguilar Ram MD   25 mg at 12/31/23 0804    apixaban (ELIQUIS) tablet 5 mg  5 mg Oral BID Aguilar Ram MD   5 mg at 12/31/23 0805    lidocaine PF 1 % injection 5 mL  5 mL IntraDERmal Once Duane Scales MD        sodium chloride flush 0.9 % injection 5-40 mL  5-40 mL IntraVENous 2 times per day Duane Scales MD   10 mL at 12/31/23 0810    sodium chloride flush 0.9 %

## 2023-12-31 NOTE — PLAN OF CARE
Problem: Discharge Planning  Goal: Discharge to home or other facility with appropriate resources  Outcome: Completed     Problem: Safety - Adult  Goal: Free from fall injury  Outcome: Completed     Problem: Metabolic/Fluid and Electrolytes - Adult  Goal: Electrolytes maintained within normal limits  Outcome: Completed  Goal: Hemodynamic stability and optimal renal function maintained  Outcome: Completed  Goal: Glucose maintained within prescribed range  Outcome: Completed     Problem: Chronic Conditions and Co-morbidities  Goal: Patient's chronic conditions and co-morbidity symptoms are monitored and maintained or improved  Outcome: Completed

## 2023-12-31 NOTE — PROGRESS NOTES
Call placed to inpatient pharmacy per advisement from nursing supervisor to see if they could transfer outpatient pharmacy prescriptions to [facility]. Inpatient pharmacy able to transfer prescriptions as a curtesy. Supervisor Yanelis updated and relayed message to Dr. English.

## 2023-12-31 NOTE — PROGRESS NOTES
Discharge paperwork completed. Patient discharge medications were sent to outpatient pharmacy. Call placed to outpatient pharmacy who's message stated they would be closed until Tuesday January 2. Spoke with patient and his wife at bedside who state they would prefer the [facility] pharmacy in [place] which they state they already told Dr. English when he asked today.     Perfect serve message sent to Dr. English who responded \"You need to transmit it. Ask ovary to do it or ask your supervisor. We don’t do dc paperwork multiple times. As you know dc orders were only placed after a request sent from nursing\". Spoke with charge nurse for advisement who reached out to supervisor as instructed.

## 2024-01-02 ENCOUNTER — TELEPHONE (OUTPATIENT)
Dept: CARDIOLOGY CLINIC | Age: 64
End: 2024-01-02

## 2024-01-02 NOTE — TELEPHONE ENCOUNTER
Pt was seen by Dr aRm for a new onset Afib, renal failure, needs HFU.  Please call Pina back with appt info, wants to RTW as soon as possible [phone] or [phone]

## 2024-01-03 ENCOUNTER — TELEPHONE (OUTPATIENT)
Dept: GASTROENTEROLOGY | Age: 64
End: 2024-01-03

## 2024-01-03 NOTE — TELEPHONE ENCOUNTER
Pt's wife calling again for a HFU apt/timing - she states he saw PCP yesterday and his is back in sinus rhythm.  Please return her call for an apt.  Thank you.

## 2024-01-04 NOTE — TELEPHONE ENCOUNTER
Patient's wife called again today. Along with F/U, she is inquiring whether or not patient will need to remain on Amiodarone and Eliquis since PCP advised he is back in sinus rhythm.

## 2024-01-05 NOTE — TELEPHONE ENCOUNTER
Ask him to stay on amiodarone and Eliquis , amiodarone will stop after I see him, Andrea for long time. Follow up with me in one month, will discuss further on follow up

## 2024-01-12 ENCOUNTER — OFFICE VISIT (OUTPATIENT)
Age: 64
End: 2024-01-12
Payer: COMMERCIAL

## 2024-01-12 VITALS
TEMPERATURE: 97.3 F | OXYGEN SATURATION: 97 % | DIASTOLIC BLOOD PRESSURE: 66 MMHG | BODY MASS INDEX: 32.1 KG/M2 | RESPIRATION RATE: 18 BRPM | HEIGHT: 72 IN | HEART RATE: 73 BPM | WEIGHT: 237 LBS | SYSTOLIC BLOOD PRESSURE: 128 MMHG

## 2024-01-12 DIAGNOSIS — D64.9 ANEMIA, UNSPECIFIED TYPE: Primary | ICD-10-CM

## 2024-01-12 DIAGNOSIS — R93.89 ABNORMAL CT SCAN: ICD-10-CM

## 2024-01-12 PROBLEM — H40.009 GLAUCOMA SUSPECT: Status: ACTIVE | Noted: 2024-01-12

## 2024-01-12 PROBLEM — I11.9 HYPERTENSIVE HEART DISEASE WITHOUT CONGESTIVE HEART FAILURE: Status: ACTIVE | Noted: 2024-01-12

## 2024-01-12 PROBLEM — N40.0 BENIGN PROSTATIC HYPERPLASIA: Status: ACTIVE | Noted: 2024-01-12

## 2024-01-12 PROBLEM — N18.31 STAGE 3A CHRONIC KIDNEY DISEASE (HCC): Status: ACTIVE | Noted: 2024-01-05

## 2024-01-12 PROBLEM — I12.9 BENIGN HYPERTENSIVE KIDNEY DISEASE: Status: ACTIVE | Noted: 2024-01-05

## 2024-01-12 PROBLEM — J01.90 ACUTE SINUSITIS: Status: ACTIVE | Noted: 2024-01-12

## 2024-01-12 PROBLEM — N17.9 ACUTE RENAL FAILURE (HCC): Status: ACTIVE | Noted: 2024-01-12

## 2024-01-12 PROBLEM — F32.A DEPRESSIVE DISORDER: Status: ACTIVE | Noted: 2024-01-12

## 2024-01-12 PROBLEM — I48.0 PAROXYSMAL ATRIAL FIBRILLATION (HCC): Status: ACTIVE | Noted: 2024-01-12

## 2024-01-12 PROBLEM — J18.9 PNEUMONIA: Status: ACTIVE | Noted: 2024-01-12

## 2024-01-12 PROBLEM — E44.0 MODERATE PROTEIN-CALORIE MALNUTRITION (HCC): Status: ACTIVE | Noted: 2024-01-12

## 2024-01-12 PROCEDURE — 1036F TOBACCO NON-USER: CPT | Performed by: NURSE PRACTITIONER

## 2024-01-12 PROCEDURE — 99202 OFFICE O/P NEW SF 15 MIN: CPT | Performed by: NURSE PRACTITIONER

## 2024-01-12 PROCEDURE — G8484 FLU IMMUNIZE NO ADMIN: HCPCS | Performed by: NURSE PRACTITIONER

## 2024-01-12 PROCEDURE — 1111F DSCHRG MED/CURRENT MED MERGE: CPT | Performed by: NURSE PRACTITIONER

## 2024-01-12 PROCEDURE — 3078F DIAST BP <80 MM HG: CPT | Performed by: NURSE PRACTITIONER

## 2024-01-12 PROCEDURE — 3074F SYST BP LT 130 MM HG: CPT | Performed by: NURSE PRACTITIONER

## 2024-01-12 PROCEDURE — G8417 CALC BMI ABV UP PARAM F/U: HCPCS | Performed by: NURSE PRACTITIONER

## 2024-01-12 PROCEDURE — G8427 DOCREV CUR MEDS BY ELIG CLIN: HCPCS | Performed by: NURSE PRACTITIONER

## 2024-01-12 PROCEDURE — 3017F COLORECTAL CA SCREEN DOC REV: CPT | Performed by: NURSE PRACTITIONER

## 2024-01-12 NOTE — PROGRESS NOTES
12/26/23 130/80     Pulse Readings from Last 3 Encounters:   01/12/24 73   12/31/23 77   12/26/23 76        Physical Exam  Constitutional:       Appearance: Normal appearance.   Cardiovascular:      Heart sounds: Normal heart sounds.   Pulmonary:      Breath sounds: Normal breath sounds.   Abdominal:      General: Bowel sounds are normal.      Palpations: Abdomen is soft.   Neurological:      Mental Status: He is alert.         Past Medical History:   Diagnosis Date    Atrial fibrillation (HCC)     Chronic sinusitis     disease - for surgical encounter 7/18/14    Diabetes mellitus (HCC)     dx 10/13; type II    Environmental allergies     Heart murmur     Hyperlipidemia     Hypertension     stable per patient    Sleep apnea     uses c pap      Past Surgical History:   Procedure Laterality Date    COLONOSCOPY  10/13/11    COLONOSCOPY  11/09/2016    DR BARRAZA    ECHO COMPL W DOP COLOR FLOW  2/27/2013         NASAL SINUS SURGERY      times 4    SINUS ENDOSCOPY Bilateral 11/4/2022    LEFT ENDOSCOPIC MEDIAL ANTROSTOMY RIGHT MAXILLARY SINUSOTOMY WITH REMOVAL OF SOFT TISSUE BILATERALLY performed by Chele Amezcua Jr., MD at [facility] OR    SINUS SURGERY Bilateral 7/18/2014    TONSILLECTOMY        Family History   Problem Relation Age of Onset    Cancer Mother         hodgkins    Other Father         blood clot    Cancer Brother         hodgkins        Lab Results   Component Value Date    WBC 8.5 12/31/2023    HGB 8.8 (L) 12/31/2023    HCT 26.3 (L) 12/31/2023    MCV 92.6 12/31/2023     12/31/2023      Lab Results   Component Value Date     12/31/2023    K 3.8 12/31/2023     12/31/2023    CO2 23 12/31/2023    BUN 36 (H) 12/31/2023    CREATININE 1.3 (H) 12/31/2023    GLUCOSE 107 (H) 12/31/2023    CALCIUM 8.1 (L) 12/31/2023    PROT 5.5 (L) 12/31/2023    LABALBU 2.9 (L) 12/31/2023    BILITOT 0.3 12/31/2023    ALKPHOS 109 12/31/2023    AST 33 12/31/2023    ALT 54 (H) 12/31/2023    LABGLOM >60 12/31/2023    GFRAA

## 2024-01-14 PROBLEM — R06.6 HICCUPS: Status: ACTIVE | Noted: 2024-01-14

## 2024-01-14 PROBLEM — R93.3 ABNORMAL FINDING ON GI TRACT IMAGING: Status: ACTIVE | Noted: 2024-01-14

## 2024-01-15 ENCOUNTER — HOSPITAL ENCOUNTER (INPATIENT)
Age: 64
LOS: 2 days | Discharge: HOME | DRG: 379 | End: 2024-01-17
Attending: STUDENT IN AN ORGANIZED HEALTH CARE EDUCATION/TRAINING PROGRAM | Admitting: STUDENT IN AN ORGANIZED HEALTH CARE EDUCATION/TRAINING PROGRAM
Payer: COMMERCIAL

## 2024-01-15 ENCOUNTER — APPOINTMENT (OUTPATIENT)
Dept: GENERAL RADIOLOGY | Age: 64
DRG: 379 | End: 2024-01-15
Payer: COMMERCIAL

## 2024-01-15 DIAGNOSIS — R42 DIZZINESS: ICD-10-CM

## 2024-01-15 DIAGNOSIS — K92.2 UPPER GI BLEED: Primary | ICD-10-CM

## 2024-01-15 DIAGNOSIS — D62 ACUTE BLOOD LOSS ANEMIA: ICD-10-CM

## 2024-01-15 DIAGNOSIS — D64.9 ANEMIA: ICD-10-CM

## 2024-01-15 DIAGNOSIS — K92.2 GASTROINTESTINAL HEMORRHAGE, UNSPECIFIED GASTROINTESTINAL HEMORRHAGE TYPE: ICD-10-CM

## 2024-01-15 LAB
ALBUMIN SERPL-MCNC: 3.5 G/DL (ref 3.5–5.2)
ALP SERPL-CCNC: 48 U/L (ref 40–129)
ALT SERPL-CCNC: 26 U/L (ref 0–40)
ANION GAP SERPL CALCULATED.3IONS-SCNC: 12 MMOL/L (ref 7–16)
AST SERPL-CCNC: 20 U/L (ref 0–39)
BASOPHILS # BLD: 0.13 K/UL (ref 0–0.2)
BASOPHILS NFR BLD: 1 % (ref 0–2)
BILIRUB SERPL-MCNC: 0.2 MG/DL (ref 0–1.2)
BNP SERPL-MCNC: 414 PG/ML (ref 0–125)
BUN SERPL-MCNC: 54 MG/DL (ref 6–23)
CALCIUM SERPL-MCNC: 8.9 MG/DL (ref 8.6–10.2)
CHLORIDE SERPL-SCNC: 106 MMOL/L (ref 98–107)
CO2 SERPL-SCNC: 21 MMOL/L (ref 22–29)
CREAT SERPL-MCNC: 1.1 MG/DL (ref 0.7–1.2)
EOSINOPHIL # BLD: 0 K/UL (ref 0.05–0.5)
EOSINOPHILS RELATIVE PERCENT: 0 % (ref 0–6)
ERYTHROCYTE [DISTWIDTH] IN BLOOD BY AUTOMATED COUNT: 14.9 % (ref 11.5–15)
FLUAV RNA RESP QL NAA+PROBE: NOT DETECTED
FLUBV RNA RESP QL NAA+PROBE: NOT DETECTED
GFR SERPL CREATININE-BSD FRML MDRD: >60 ML/MIN/1.73M2
GLUCOSE BLD-MCNC: 154 MG/DL (ref 74–99)
GLUCOSE BLD-MCNC: 172 MG/DL (ref 74–99)
GLUCOSE SERPL-MCNC: 167 MG/DL (ref 74–99)
HCT VFR BLD AUTO: 19.9 % (ref 37–54)
HCT VFR BLD AUTO: 20 % (ref 37–54)
HCT VFR BLD AUTO: 20.4 % (ref 37–54)
HGB BLD-MCNC: 6.4 G/DL (ref 12.5–16.5)
HGB BLD-MCNC: 6.5 G/DL (ref 12.5–16.5)
HGB BLD-MCNC: 6.6 G/DL (ref 12.5–16.5)
LACTATE BLDV-SCNC: 3 MMOL/L (ref 0.5–2.2)
LIPASE SERPL-CCNC: 122 U/L (ref 13–60)
LYMPHOCYTES NFR BLD: 3.1 K/UL (ref 1.5–4)
LYMPHOCYTES RELATIVE PERCENT: 24 % (ref 20–42)
MCH RBC QN AUTO: 31.7 PG (ref 26–35)
MCHC RBC AUTO-ENTMCNC: 33 G/DL (ref 32–34.5)
MCV RBC AUTO: 96.2 FL (ref 80–99.9)
MONOCYTES NFR BLD: 1.94 K/UL (ref 0.1–0.95)
MONOCYTES NFR BLD: 15 % (ref 2–12)
NEUTROPHILS NFR BLD: 60 % (ref 43–80)
NEUTS SEG NFR BLD: 7.74 K/UL (ref 1.8–7.3)
PLATELET # BLD AUTO: 495 K/UL (ref 130–450)
PMV BLD AUTO: 8.9 FL (ref 7–12)
POTASSIUM SERPL-SCNC: 4.2 MMOL/L (ref 3.5–5)
PROT SERPL-MCNC: 5.8 G/DL (ref 6.4–8.3)
RBC # BLD AUTO: 2.08 M/UL (ref 3.8–5.8)
SARS-COV-2 RNA RESP QL NAA+PROBE: NOT DETECTED
SODIUM SERPL-SCNC: 139 MMOL/L (ref 132–146)
SOURCE: NORMAL
SPECIMEN DESCRIPTION: NORMAL
TROPONIN I SERPL HS-MCNC: 19 NG/L (ref 0–11)
WBC OTHER # BLD: 12.9 K/UL (ref 4.5–11.5)

## 2024-01-15 PROCEDURE — 83880 ASSAY OF NATRIURETIC PEPTIDE: CPT

## 2024-01-15 PROCEDURE — 85014 HEMATOCRIT: CPT

## 2024-01-15 PROCEDURE — 96375 TX/PRO/DX INJ NEW DRUG ADDON: CPT

## 2024-01-15 PROCEDURE — 6370000000 HC RX 637 (ALT 250 FOR IP): Performed by: STUDENT IN AN ORGANIZED HEALTH CARE EDUCATION/TRAINING PROGRAM

## 2024-01-15 PROCEDURE — C9113 INJ PANTOPRAZOLE SODIUM, VIA: HCPCS | Performed by: STUDENT IN AN ORGANIZED HEALTH CARE EDUCATION/TRAINING PROGRAM

## 2024-01-15 PROCEDURE — 80053 COMPREHEN METABOLIC PANEL: CPT

## 2024-01-15 PROCEDURE — 96374 THER/PROPH/DIAG INJ IV PUSH: CPT

## 2024-01-15 PROCEDURE — 86850 RBC ANTIBODY SCREEN: CPT

## 2024-01-15 PROCEDURE — 83605 ASSAY OF LACTIC ACID: CPT

## 2024-01-15 PROCEDURE — 2580000003 HC RX 258: Performed by: STUDENT IN AN ORGANIZED HEALTH CARE EDUCATION/TRAINING PROGRAM

## 2024-01-15 PROCEDURE — P9016 RBC LEUKOCYTES REDUCED: HCPCS

## 2024-01-15 PROCEDURE — 96361 HYDRATE IV INFUSION ADD-ON: CPT

## 2024-01-15 PROCEDURE — 82962 GLUCOSE BLOOD TEST: CPT

## 2024-01-15 PROCEDURE — 85025 COMPLETE CBC W/AUTO DIFF WBC: CPT

## 2024-01-15 PROCEDURE — A4216 STERILE WATER/SALINE, 10 ML: HCPCS | Performed by: STUDENT IN AN ORGANIZED HEALTH CARE EDUCATION/TRAINING PROGRAM

## 2024-01-15 PROCEDURE — 1200000000 HC SEMI PRIVATE

## 2024-01-15 PROCEDURE — 36430 TRANSFUSION BLD/BLD COMPNT: CPT

## 2024-01-15 PROCEDURE — 99285 EMERGENCY DEPT VISIT HI MDM: CPT

## 2024-01-15 PROCEDURE — 83690 ASSAY OF LIPASE: CPT

## 2024-01-15 PROCEDURE — 30233N1 TRANSFUSION OF NONAUTOLOGOUS RED BLOOD CELLS INTO PERIPHERAL VEIN, PERCUTANEOUS APPROACH: ICD-10-PCS | Performed by: STUDENT IN AN ORGANIZED HEALTH CARE EDUCATION/TRAINING PROGRAM

## 2024-01-15 PROCEDURE — 6360000002 HC RX W HCPCS: Performed by: STUDENT IN AN ORGANIZED HEALTH CARE EDUCATION/TRAINING PROGRAM

## 2024-01-15 PROCEDURE — 86901 BLOOD TYPING SEROLOGIC RH(D): CPT

## 2024-01-15 PROCEDURE — 36415 COLL VENOUS BLD VENIPUNCTURE: CPT

## 2024-01-15 PROCEDURE — 93005 ELECTROCARDIOGRAM TRACING: CPT | Performed by: STUDENT IN AN ORGANIZED HEALTH CARE EDUCATION/TRAINING PROGRAM

## 2024-01-15 PROCEDURE — 84484 ASSAY OF TROPONIN QUANT: CPT

## 2024-01-15 PROCEDURE — 71045 X-RAY EXAM CHEST 1 VIEW: CPT

## 2024-01-15 PROCEDURE — 87636 SARSCOV2 & INF A&B AMP PRB: CPT

## 2024-01-15 PROCEDURE — 85018 HEMOGLOBIN: CPT

## 2024-01-15 PROCEDURE — 86923 COMPATIBILITY TEST ELECTRIC: CPT

## 2024-01-15 PROCEDURE — 86900 BLOOD TYPING SEROLOGIC ABO: CPT

## 2024-01-15 RX ORDER — CHLORPROMAZINE HYDROCHLORIDE 25 MG/1
25 TABLET, FILM COATED ORAL EVERY 6 HOURS PRN
Status: DISCONTINUED | OUTPATIENT
Start: 2024-01-15 | End: 2024-01-17 | Stop reason: HOSPADM

## 2024-01-15 RX ORDER — SUCRALFATE 1 G/1
1 TABLET ORAL EVERY 6 HOURS SCHEDULED
Status: DISCONTINUED | OUTPATIENT
Start: 2024-01-15 | End: 2024-01-17 | Stop reason: HOSPADM

## 2024-01-15 RX ORDER — AMIODARONE HYDROCHLORIDE 200 MG/1
200 TABLET ORAL DAILY
Status: DISCONTINUED | OUTPATIENT
Start: 2024-01-15 | End: 2024-01-17 | Stop reason: HOSPADM

## 2024-01-15 RX ORDER — SODIUM CHLORIDE 0.9 % (FLUSH) 0.9 %
10 SYRINGE (ML) INJECTION EVERY 12 HOURS SCHEDULED
Status: DISCONTINUED | OUTPATIENT
Start: 2024-01-15 | End: 2024-01-17 | Stop reason: HOSPADM

## 2024-01-15 RX ORDER — ACETAMINOPHEN 325 MG/1
650 TABLET ORAL EVERY 6 HOURS PRN
Status: DISCONTINUED | OUTPATIENT
Start: 2024-01-15 | End: 2024-01-17 | Stop reason: HOSPADM

## 2024-01-15 RX ORDER — PROCHLORPERAZINE EDISYLATE 5 MG/ML
10 INJECTION INTRAMUSCULAR; INTRAVENOUS ONCE
Status: COMPLETED | OUTPATIENT
Start: 2024-01-15 | End: 2024-01-15

## 2024-01-15 RX ORDER — 0.9 % SODIUM CHLORIDE 0.9 %
500 INTRAVENOUS SOLUTION INTRAVENOUS ONCE
Status: COMPLETED | OUTPATIENT
Start: 2024-01-15 | End: 2024-01-15

## 2024-01-15 RX ORDER — DEXTROSE MONOHYDRATE 100 MG/ML
INJECTION, SOLUTION INTRAVENOUS CONTINUOUS PRN
Status: DISCONTINUED | OUTPATIENT
Start: 2024-01-15 | End: 2024-01-17 | Stop reason: HOSPADM

## 2024-01-15 RX ORDER — ONDANSETRON 2 MG/ML
4 INJECTION INTRAMUSCULAR; INTRAVENOUS EVERY 6 HOURS PRN
Status: DISCONTINUED | OUTPATIENT
Start: 2024-01-15 | End: 2024-01-17 | Stop reason: HOSPADM

## 2024-01-15 RX ORDER — ACETAMINOPHEN 650 MG/1
650 SUPPOSITORY RECTAL EVERY 6 HOURS PRN
Status: DISCONTINUED | OUTPATIENT
Start: 2024-01-15 | End: 2024-01-17 | Stop reason: HOSPADM

## 2024-01-15 RX ORDER — INSULIN LISPRO 100 [IU]/ML
0-8 INJECTION, SOLUTION INTRAVENOUS; SUBCUTANEOUS
Status: DISCONTINUED | OUTPATIENT
Start: 2024-01-15 | End: 2024-01-17 | Stop reason: HOSPADM

## 2024-01-15 RX ORDER — INSULIN LISPRO 100 [IU]/ML
0-4 INJECTION, SOLUTION INTRAVENOUS; SUBCUTANEOUS NIGHTLY
Status: DISCONTINUED | OUTPATIENT
Start: 2024-01-15 | End: 2024-01-17 | Stop reason: HOSPADM

## 2024-01-15 RX ORDER — SODIUM CHLORIDE 9 MG/ML
INJECTION, SOLUTION INTRAVENOUS PRN
Status: DISCONTINUED | OUTPATIENT
Start: 2024-01-15 | End: 2024-01-17 | Stop reason: HOSPADM

## 2024-01-15 RX ORDER — ONDANSETRON 4 MG/1
4 TABLET, ORALLY DISINTEGRATING ORAL EVERY 8 HOURS PRN
Status: DISCONTINUED | OUTPATIENT
Start: 2024-01-15 | End: 2024-01-17 | Stop reason: HOSPADM

## 2024-01-15 RX ORDER — SODIUM CHLORIDE, SODIUM LACTATE, POTASSIUM CHLORIDE, CALCIUM CHLORIDE 600; 310; 30; 20 MG/100ML; MG/100ML; MG/100ML; MG/100ML
INJECTION, SOLUTION INTRAVENOUS CONTINUOUS
Status: ACTIVE | OUTPATIENT
Start: 2024-01-15 | End: 2024-01-16

## 2024-01-15 RX ORDER — SODIUM CHLORIDE 0.9 % (FLUSH) 0.9 %
10 SYRINGE (ML) INJECTION PRN
Status: DISCONTINUED | OUTPATIENT
Start: 2024-01-15 | End: 2024-01-17 | Stop reason: HOSPADM

## 2024-01-15 RX ORDER — POTASSIUM CHLORIDE 7.45 MG/ML
10 INJECTION INTRAVENOUS PRN
Status: DISCONTINUED | OUTPATIENT
Start: 2024-01-15 | End: 2024-01-17 | Stop reason: HOSPADM

## 2024-01-15 RX ORDER — SENNOSIDES A AND B 8.6 MG/1
1 TABLET, FILM COATED ORAL DAILY PRN
Status: DISCONTINUED | OUTPATIENT
Start: 2024-01-15 | End: 2024-01-17 | Stop reason: HOSPADM

## 2024-01-15 RX ORDER — POTASSIUM CHLORIDE 20 MEQ/1
40 TABLET, EXTENDED RELEASE ORAL PRN
Status: DISCONTINUED | OUTPATIENT
Start: 2024-01-15 | End: 2024-01-17 | Stop reason: HOSPADM

## 2024-01-15 RX ADMIN — PROCHLORPERAZINE EDISYLATE 10 MG: 5 INJECTION INTRAMUSCULAR; INTRAVENOUS at 08:54

## 2024-01-15 RX ADMIN — SODIUM CHLORIDE, POTASSIUM CHLORIDE, SODIUM LACTATE AND CALCIUM CHLORIDE: 600; 310; 30; 20 INJECTION, SOLUTION INTRAVENOUS at 15:39

## 2024-01-15 RX ADMIN — METOPROLOL TARTRATE 25 MG: 25 TABLET, FILM COATED ORAL at 20:42

## 2024-01-15 RX ADMIN — PANTOPRAZOLE SODIUM 40 MG: 40 INJECTION, POWDER, FOR SOLUTION INTRAVENOUS at 09:21

## 2024-01-15 RX ADMIN — SUCRALFATE 1 G: 1 TABLET ORAL at 23:18

## 2024-01-15 RX ADMIN — SODIUM CHLORIDE, POTASSIUM CHLORIDE, SODIUM LACTATE AND CALCIUM CHLORIDE: 600; 310; 30; 20 INJECTION, SOLUTION INTRAVENOUS at 20:46

## 2024-01-15 RX ADMIN — SODIUM CHLORIDE 500 ML: 9 INJECTION, SOLUTION INTRAVENOUS at 09:03

## 2024-01-15 RX ADMIN — AMIODARONE HYDROCHLORIDE 200 MG: 200 TABLET ORAL at 15:38

## 2024-01-15 RX ADMIN — PANTOPRAZOLE SODIUM 40 MG: 40 INJECTION, POWDER, FOR SOLUTION INTRAVENOUS at 20:41

## 2024-01-15 ASSESSMENT — PAIN SCALES - GENERAL: PAINLEVEL_OUTOF10: 0

## 2024-01-15 ASSESSMENT — PAIN - FUNCTIONAL ASSESSMENT: PAIN_FUNCTIONAL_ASSESSMENT: NONE - DENIES PAIN

## 2024-01-15 NOTE — ED NOTES
Patient arrived from [facility] ED , patient denies any complaints , vitals updated

## 2024-01-15 NOTE — ED PROVIDER NOTES
[facility] EMERGENCY DEPARTMENT  EMERGENCY DEPARTMENT ENCOUNTER      Pt Name: Cain Bennett  MRN: [id number]  Birthdate 1960  Date of evaluation: 1/15/2024  Provider: Rigoberto Frost DO  PCP: Reza Limon MD  Note Started: 8:53 AM EST 1/15/24    CHIEF COMPLAINT       Chief Complaint   Patient presents with    Dizziness     Ongoing dizziness for 3 days       HISTORY OF PRESENT ILLNESS: 1 or more Elements   History From: Patient  Limitations to history : None    Cain Bennett is a 63 y.o. male Past medical history of hypertension, hyperlipidemia, diabetes as well as A-fib anticoagulated on Eliquis.  Patient presents with chief complaint of dizziness.  Patient states that he has had gradual worsening dizziness for the last 3 days.  Patient describes it as a lightheadedness sensation.  Patient states that symptoms have been moderate in severity constant onset.  He notes that symptoms are worsened with standing he denies any relieving factors.  Patient denies any fevers, chills, chest pain, abdominal pain, constipation or diarrhea.    Nursing Notes were all reviewed and agreed with or any disagreements were addressed in the HPI.    REVIEW OF SYSTEMS :    Positives and Pertinent negatives as per HPI.     PAST MEDICAL HISTORY/Chronic Conditions Affecting Care    has a past medical history of Atrial fibrillation (HCC), Chronic sinusitis, Diabetes mellitus (HCC), Environmental allergies, Heart murmur, Hyperlipidemia, Hypertension, and Sleep apnea.     SURGICAL HISTORY     Past Surgical History:   Procedure Laterality Date    COLONOSCOPY  10/13/11    COLONOSCOPY  11/09/2016    DR BARRAZA    ECHO COMPL W DOP COLOR FLOW  2/27/2013         NASAL SINUS SURGERY      times 4    SINUS ENDOSCOPY Bilateral 11/4/2022    LEFT ENDOSCOPIC MEDIAL ANTROSTOMY RIGHT MAXILLARY SINUSOTOMY WITH REMOVAL OF SOFT TISSUE BILATERALLY performed by Chele Amezcua Jr., MD at [facility] OR    SINUS SURGERY Bilateral 
P Axis 67 degrees    R Axis 19 degrees    T Axis 50 degrees   TYPE AND SCREEN   Result Value Ref Range    Blood Bank Sample Expiration 01/18/2024,2359     Arm Band Number [id number]     ABO/Rh A POSITIVE     Antibody Screen NEGATIVE     Unit Number [id number]     Component Leukocyte Reduced Red Cell     Unit Divison 00     Dispense Status Blood Bank TRANSFUSED     Unit Issue Date/Time 599184331052     Product Code Blood Bank [id number]     Blood Bank Unit Type and Rh A NEG     Blood Bank ISBT Product Blood Type 0600     Blood Bank Blood Product Expiration Date 202401212359     Transfusion Status OK TO TRANSFUSE     Crossmatch Result Electronically Compatible     Unit Number [id number]     Component Leukocyte Reduced Red Cell     Unit Divison 00     Dispense Status Blood Bank TRANSFUSED     Unit Issue Date/Time 897230933445     Product Code Blood Bank [id number]     Blood Bank Unit Type and Rh A POS     Blood Bank ISBT Product Blood Type 6200     Blood Bank Blood Product Expiration Date 202401232359     Transfusion Status OK TO TRANSFUSE     Crossmatch Result Electronically Compatible     Unit Number [id number]     Component Leukocyte Reduced Red Cell     Unit Divison 00     Dispense Status Blood Bank ALLOCATED     Transfusion Status OK TO TRANSFUSE     Crossmatch Result Electronically Compatible        RADIOLOGY:  XR CHEST PORTABLE   Final Result   Borderline cardiomegaly and patchy left basilar infiltrate.                   ------------------------- NURSING NOTES AND VITALS REVIEWED ---------------------------  Date / Time Roomed:  1/15/2024  8:32 AM  ED Bed Assignment:  [id number]    The nursing notes within the ED encounter and vital signs as below have been reviewed.     Patient Vitals for the past 24 hrs:   BP Temp Temp src Pulse Resp SpO2   01/17/24 0851 132/69 97.1 °F (36.2 °C) Temporal 78 18 95 %   01/16/24 1900 125/65 97.8 °F (36.6 °C) Temporal 78 18 97 %       Oxygen Saturation Interpretation:

## 2024-01-16 ENCOUNTER — ANESTHESIA (OUTPATIENT)
Dept: ENDOSCOPY | Age: 64
DRG: 379 | End: 2024-01-16
Payer: COMMERCIAL

## 2024-01-16 ENCOUNTER — ANESTHESIA EVENT (OUTPATIENT)
Dept: ENDOSCOPY | Age: 64
DRG: 379 | End: 2024-01-16
Payer: COMMERCIAL

## 2024-01-16 LAB
ALBUMIN SERPL-MCNC: 3.3 G/DL (ref 3.5–5.2)
ALP SERPL-CCNC: 43 U/L (ref 40–129)
ALT SERPL-CCNC: 21 U/L (ref 0–40)
ANION GAP SERPL CALCULATED.3IONS-SCNC: 11 MMOL/L (ref 7–16)
AST SERPL-CCNC: 16 U/L (ref 0–39)
BASOPHILS # BLD: 0.11 K/UL (ref 0–0.2)
BASOPHILS NFR BLD: 1 % (ref 0–2)
BILIRUB SERPL-MCNC: 0.7 MG/DL (ref 0–1.2)
BUN SERPL-MCNC: 40 MG/DL (ref 6–23)
CALCIUM SERPL-MCNC: 8.5 MG/DL (ref 8.6–10.2)
CHLORIDE SERPL-SCNC: 108 MMOL/L (ref 98–107)
CO2 SERPL-SCNC: 20 MMOL/L (ref 22–29)
CREAT SERPL-MCNC: 1.2 MG/DL (ref 0.7–1.2)
EKG ATRIAL RATE: 99 BPM
EKG P AXIS: 67 DEGREES
EKG P-R INTERVAL: 212 MS
EKG Q-T INTERVAL: 394 MS
EKG QRS DURATION: 106 MS
EKG QTC CALCULATION (BAZETT): 505 MS
EKG R AXIS: 19 DEGREES
EKG T AXIS: 50 DEGREES
EKG VENTRICULAR RATE: 99 BPM
EOSINOPHIL # BLD: 0.11 K/UL (ref 0.05–0.5)
EOSINOPHILS RELATIVE PERCENT: 1 % (ref 0–6)
ERYTHROCYTE [DISTWIDTH] IN BLOOD BY AUTOMATED COUNT: 17.1 % (ref 11.5–15)
GFR SERPL CREATININE-BSD FRML MDRD: >60 ML/MIN/1.73M2
GLUCOSE BLD-MCNC: 100 MG/DL (ref 74–99)
GLUCOSE BLD-MCNC: 127 MG/DL (ref 74–99)
GLUCOSE BLD-MCNC: 147 MG/DL (ref 74–99)
GLUCOSE SERPL-MCNC: 110 MG/DL (ref 74–99)
HCT VFR BLD AUTO: 23.2 % (ref 37–54)
HCT VFR BLD AUTO: 24.1 % (ref 37–54)
HGB BLD-MCNC: 7.2 G/DL (ref 12.5–16.5)
HGB BLD-MCNC: 7.9 G/DL (ref 12.5–16.5)
LYMPHOCYTES NFR BLD: 3.24 K/UL (ref 1.5–4)
LYMPHOCYTES RELATIVE PERCENT: 26 % (ref 20–42)
MCH RBC QN AUTO: 30.7 PG (ref 26–35)
MCHC RBC AUTO-ENTMCNC: 32.8 G/DL (ref 32–34.5)
MCV RBC AUTO: 93.8 FL (ref 80–99.9)
METAMYELOCYTES ABSOLUTE COUNT: 0.11 K/UL (ref 0–0.12)
METAMYELOCYTES: 1 % (ref 0–1)
MONOCYTES NFR BLD: 1.29 K/UL (ref 0.1–0.95)
MONOCYTES NFR BLD: 11 % (ref 2–12)
MYELOCYTES ABSOLUTE COUNT: 0.22 K/UL
MYELOCYTES: 2 %
NEUTROPHILS NFR BLD: 59 % (ref 43–80)
NEUTS SEG NFR BLD: 7.23 K/UL (ref 1.8–7.3)
NUCLEATED RED BLOOD CELLS: 3 PER 100 WBC
PLATELET # BLD AUTO: 392 K/UL (ref 130–450)
PMV BLD AUTO: 8.6 FL (ref 7–12)
POTASSIUM SERPL-SCNC: 4.1 MMOL/L (ref 3.5–5)
PROT SERPL-MCNC: 5.2 G/DL (ref 6.4–8.3)
RBC # BLD AUTO: 2.57 M/UL (ref 3.8–5.8)
RBC # BLD: ABNORMAL 10*6/UL
SODIUM SERPL-SCNC: 139 MMOL/L (ref 132–146)
WBC OTHER # BLD: 12.3 K/UL (ref 4.5–11.5)

## 2024-01-16 PROCEDURE — 85018 HEMOGLOBIN: CPT

## 2024-01-16 PROCEDURE — 6360000002 HC RX W HCPCS: Performed by: STUDENT IN AN ORGANIZED HEALTH CARE EDUCATION/TRAINING PROGRAM

## 2024-01-16 PROCEDURE — 2500000003 HC RX 250 WO HCPCS

## 2024-01-16 PROCEDURE — A4216 STERILE WATER/SALINE, 10 ML: HCPCS | Performed by: STUDENT IN AN ORGANIZED HEALTH CARE EDUCATION/TRAINING PROGRAM

## 2024-01-16 PROCEDURE — 2580000003 HC RX 258: Performed by: STUDENT IN AN ORGANIZED HEALTH CARE EDUCATION/TRAINING PROGRAM

## 2024-01-16 PROCEDURE — 7100000001 HC PACU RECOVERY - ADDTL 15 MIN: Performed by: STUDENT IN AN ORGANIZED HEALTH CARE EDUCATION/TRAINING PROGRAM

## 2024-01-16 PROCEDURE — 3700000000 HC ANESTHESIA ATTENDED CARE: Performed by: STUDENT IN AN ORGANIZED HEALTH CARE EDUCATION/TRAINING PROGRAM

## 2024-01-16 PROCEDURE — 36430 TRANSFUSION BLD/BLD COMPNT: CPT

## 2024-01-16 PROCEDURE — 3609012400 HC EGD TRANSORAL BIOPSY SINGLE/MULTIPLE: Performed by: STUDENT IN AN ORGANIZED HEALTH CARE EDUCATION/TRAINING PROGRAM

## 2024-01-16 PROCEDURE — 93010 ELECTROCARDIOGRAM REPORT: CPT | Performed by: INTERNAL MEDICINE

## 2024-01-16 PROCEDURE — C9113 INJ PANTOPRAZOLE SODIUM, VIA: HCPCS | Performed by: STUDENT IN AN ORGANIZED HEALTH CARE EDUCATION/TRAINING PROGRAM

## 2024-01-16 PROCEDURE — 85014 HEMATOCRIT: CPT

## 2024-01-16 PROCEDURE — 82962 GLUCOSE BLOOD TEST: CPT

## 2024-01-16 PROCEDURE — 0DB98ZX EXCISION OF DUODENUM, VIA NATURAL OR ARTIFICIAL OPENING ENDOSCOPIC, DIAGNOSTIC: ICD-10-PCS | Performed by: STUDENT IN AN ORGANIZED HEALTH CARE EDUCATION/TRAINING PROGRAM

## 2024-01-16 PROCEDURE — P9016 RBC LEUKOCYTES REDUCED: HCPCS

## 2024-01-16 PROCEDURE — 88305 TISSUE EXAM BY PATHOLOGIST: CPT

## 2024-01-16 PROCEDURE — 2709999900 HC NON-CHARGEABLE SUPPLY: Performed by: STUDENT IN AN ORGANIZED HEALTH CARE EDUCATION/TRAINING PROGRAM

## 2024-01-16 PROCEDURE — 1200000000 HC SEMI PRIVATE

## 2024-01-16 PROCEDURE — 6370000000 HC RX 637 (ALT 250 FOR IP): Performed by: STUDENT IN AN ORGANIZED HEALTH CARE EDUCATION/TRAINING PROGRAM

## 2024-01-16 PROCEDURE — 6360000002 HC RX W HCPCS

## 2024-01-16 PROCEDURE — 36415 COLL VENOUS BLD VENIPUNCTURE: CPT

## 2024-01-16 PROCEDURE — 85025 COMPLETE CBC W/AUTO DIFF WBC: CPT

## 2024-01-16 PROCEDURE — 0DB78ZX EXCISION OF STOMACH, PYLORUS, VIA NATURAL OR ARTIFICIAL OPENING ENDOSCOPIC, DIAGNOSTIC: ICD-10-PCS | Performed by: STUDENT IN AN ORGANIZED HEALTH CARE EDUCATION/TRAINING PROGRAM

## 2024-01-16 PROCEDURE — 7100000000 HC PACU RECOVERY - FIRST 15 MIN: Performed by: STUDENT IN AN ORGANIZED HEALTH CARE EDUCATION/TRAINING PROGRAM

## 2024-01-16 PROCEDURE — 3700000001 HC ADD 15 MINUTES (ANESTHESIA): Performed by: STUDENT IN AN ORGANIZED HEALTH CARE EDUCATION/TRAINING PROGRAM

## 2024-01-16 PROCEDURE — 80053 COMPREHEN METABOLIC PANEL: CPT

## 2024-01-16 RX ORDER — LIDOCAINE HYDROCHLORIDE 20 MG/ML
INJECTION, SOLUTION INFILTRATION; PERINEURAL PRN
Status: DISCONTINUED | OUTPATIENT
Start: 2024-01-16 | End: 2024-01-16 | Stop reason: SDUPTHER

## 2024-01-16 RX ORDER — PROPOFOL 10 MG/ML
INJECTION, EMULSION INTRAVENOUS PRN
Status: DISCONTINUED | OUTPATIENT
Start: 2024-01-16 | End: 2024-01-16 | Stop reason: SDUPTHER

## 2024-01-16 RX ORDER — SODIUM CHLORIDE 9 MG/ML
INJECTION, SOLUTION INTRAVENOUS PRN
Status: DISCONTINUED | OUTPATIENT
Start: 2024-01-16 | End: 2024-01-16

## 2024-01-16 RX ORDER — SODIUM CHLORIDE 9 MG/ML
INJECTION, SOLUTION INTRAVENOUS PRN
Status: DISCONTINUED | OUTPATIENT
Start: 2024-01-16 | End: 2024-01-17 | Stop reason: HOSPADM

## 2024-01-16 RX ADMIN — LIDOCAINE HYDROCHLORIDE 40 MG: 20 INJECTION, SOLUTION INFILTRATION; PERINEURAL at 09:00

## 2024-01-16 RX ADMIN — AMIODARONE HYDROCHLORIDE 200 MG: 200 TABLET ORAL at 10:44

## 2024-01-16 RX ADMIN — METOPROLOL TARTRATE 25 MG: 25 TABLET, FILM COATED ORAL at 19:56

## 2024-01-16 RX ADMIN — SUCRALFATE 1 G: 1 TABLET ORAL at 05:34

## 2024-01-16 RX ADMIN — SODIUM CHLORIDE, POTASSIUM CHLORIDE, SODIUM LACTATE AND CALCIUM CHLORIDE: 600; 310; 30; 20 INJECTION, SOLUTION INTRAVENOUS at 05:38

## 2024-01-16 RX ADMIN — PANTOPRAZOLE SODIUM 40 MG: 40 INJECTION, POWDER, FOR SOLUTION INTRAVENOUS at 19:55

## 2024-01-16 RX ADMIN — SUCRALFATE 1 G: 1 TABLET ORAL at 23:04

## 2024-01-16 RX ADMIN — SODIUM CHLORIDE, PRESERVATIVE FREE 10 ML: 5 INJECTION INTRAVENOUS at 19:56

## 2024-01-16 RX ADMIN — PROPOFOL 180 MG: 10 INJECTION, EMULSION INTRAVENOUS at 09:00

## 2024-01-16 RX ADMIN — METOPROLOL TARTRATE 25 MG: 25 TABLET, FILM COATED ORAL at 10:45

## 2024-01-16 RX ADMIN — SUCRALFATE 1 G: 1 TABLET ORAL at 10:44

## 2024-01-16 RX ADMIN — PANTOPRAZOLE SODIUM 40 MG: 40 INJECTION, POWDER, FOR SOLUTION INTRAVENOUS at 08:22

## 2024-01-16 RX ADMIN — SUCRALFATE 1 G: 1 TABLET ORAL at 18:05

## 2024-01-16 ASSESSMENT — PAIN SCALES - GENERAL: PAINLEVEL_OUTOF10: 0

## 2024-01-16 ASSESSMENT — PAIN - FUNCTIONAL ASSESSMENT: PAIN_FUNCTIONAL_ASSESSMENT: 0-10

## 2024-01-16 NOTE — ACP (ADVANCE CARE PLANNING)
Advance Care Planning   The patient has the following advanced directives on file:  Advance Directives       Power of  Living Will ACP-Advance Directive ACP-Power of     Not on File Not on File Not on File Not on File            The patient has appointed the following active healthcare agents:    Primary Decision Maker: Pina Bennett - Spouse - [phone], [phone]    The Patient has the following current code status:    Code Status: Full Code        MAYRA Rodrigues  1/16/2024

## 2024-01-16 NOTE — ANESTHESIA PRE PROCEDURE
Department of Anesthesiology  Preprocedure Note       Name:  Cain Bennett   Age:  63 y.o.  :  1960                                          MRN:  [id number]         Date:  2024      Surgeon: Surgeon(s):  Herbert Branch MD    Procedure: Procedure(s):  EGD DIAGNOSTIC ONLY    Medications prior to admission:   Prior to Admission medications    Medication Sig Start Date End Date Taking? Authorizing Provider   amiodarone (CORDARONE) 200 MG tablet Take 1 tablet by mouth daily 24   Gage English MD   apixaban (ELIQUIS) 5 MG TABS tablet Take 1 tablet by mouth 2 times daily 23   Gage English MD   chlorproMAZINE (THORAZINE) 25 MG tablet Take 1 tablet by mouth every 6 hours as needed (hickups) 23   Gage English MD   Cholecalciferol (VITAMIN D3) 25 MCG TABS Take 1 tablet by mouth daily 24   Gage English MD   metoprolol tartrate (LOPRESSOR) 25 MG tablet Take 1 tablet by mouth 2 times daily 23   Gage English MD   pantoprazole (PROTONIX) 40 MG tablet Take 1 tablet by mouth every morning (before breakfast) 23   Gage English MD   rosuvastatin (CRESTOR) 10 MG tablet Take 1 tablet by mouth every evening 23   Gage English MD   sucralfate (CARAFATE) 1 GM tablet Take 1 tablet by mouth 4 times daily (with meals and nightly) 23   Gage English MD   aspirin 81 MG EC tablet Take 1 tablet by mouth daily    Mali Bragg MD   fenofibrate (TRIGLIDE) 160 MG tablet Take 1 tablet by mouth daily    Mali Bragg MD   mometasone (NASONEX) 50 MCG/ACT nasal spray 2 sprays by Nasal route daily as needed    Mali Bragg MD   Multiple Vitamins-Minerals (THERAPEUTIC MULTIVITAMIN-MINERALS) tablet Take 1 tablet by mouth daily    Mali Bragg MD   metFORMIN (GLUCOPHAGE) 500 MG tablet Take 1 tablet by mouth 2 times daily (with meals)    Mali Bragg MD       Current medications:

## 2024-01-16 NOTE — PLAN OF CARE
Problem: Safety - Adult  Goal: Free from fall injury  1/16/2024 1034 by Nidhi Blankenship, RN  Outcome: Progressing  1/15/2024 2221 by Harrison Quiñones, RN  Outcome: Progressing     Problem: Chronic Conditions and Co-morbidities  Goal: Patient's chronic conditions and co-morbidity symptoms are monitored and maintained or improved  Outcome: Progressing     Problem: Pain  Goal: Verbalizes/displays adequate comfort level or baseline comfort level  Outcome: Progressing

## 2024-01-16 NOTE — ANESTHESIA POSTPROCEDURE EVALUATION
Department of Anesthesiology  Postprocedure Note    Patient: Cain Bennett  MRN: [id number]  YOB: 1960  Date of evaluation: 1/16/2024    Procedure Summary       Date: 01/16/24 Room / Location: [place] [address] / [facility]    Anesthesia Start: 0853 Anesthesia Stop: 0924    Procedure: EGD BIOPSY Diagnosis:       Anemia      (Anemia [D64.9])    Surgeons: Herbert Branch MD Responsible Provider: Rimma Vasquez MD    Anesthesia Type: MAC ASA Status: 3            Anesthesia Type: MAC    Darrel Phase I: Darrel Score: 10    Darrel Phase II:      Anesthesia Post Evaluation    Patient location during evaluation: PACU  Patient participation: complete - patient participated  Level of consciousness: awake and alert  Airway patency: patent  Nausea & Vomiting: no nausea and no vomiting  Cardiovascular status: blood pressure returned to baseline and hemodynamically stable  Respiratory status: acceptable and spontaneous ventilation  Hydration status: euvolemic  Multimodal analgesia pain management approach  Pain management: adequate    No notable events documented.

## 2024-01-16 NOTE — H&P
Heart valve disease 03/21/2013     A. Echo 02/2013 (JS): Mild AS/AI/MR -  Aortic valve mean gradient 11      Hyperlipidemia 03/21/2013       Plan   Acute on chronic anemia 2/2 GI bleeding   From recent admission 2 weeks ago Iron panel Ferritin 486, Iron 91, Iron sat 34, TIBC 265, B12 524, Folate 11.5   Transfuse to maintain Hb >7   Recently CT a/p last admission showing duodenitis   PPI IV BID   Carafate   GI consult   EGD 1/16/24 has confirmed recent CT findings of duodenitis, no other acute abnormality or lesions noted   Check HH today 2 pm     Atrial fibrillation   Continue home regimen   Monitor   Hold OAC     Intractable Hiccups   Appreciate GI recs   Improved / resolved     PT/OT  Home medications to be reconciled and confirmed prior to being ordered  DVT prophylaxis   Code Status   Discharge plan: TBD pending clinical improvement     Duane Scales MD  Internal medicine   1/16/2024  9:54 AM    I can be reached through [employer].    NOTE:  This report was transcribed using voice recognition software.  Every effort was made to ensure accuracy; however, inadvertent computerized transcription errors may be present.

## 2024-01-17 VITALS
HEIGHT: 72 IN | HEART RATE: 78 BPM | WEIGHT: 237 LBS | RESPIRATION RATE: 18 BRPM | BODY MASS INDEX: 32.1 KG/M2 | SYSTOLIC BLOOD PRESSURE: 132 MMHG | DIASTOLIC BLOOD PRESSURE: 69 MMHG | TEMPERATURE: 97.1 F | OXYGEN SATURATION: 95 %

## 2024-01-17 LAB
ALBUMIN SERPL-MCNC: 3.5 G/DL (ref 3.5–5.2)
ALP SERPL-CCNC: 47 U/L (ref 40–129)
ALT SERPL-CCNC: 21 U/L (ref 0–40)
ANION GAP SERPL CALCULATED.3IONS-SCNC: 12 MMOL/L (ref 7–16)
AST SERPL-CCNC: 18 U/L (ref 0–39)
BASOPHILS # BLD: 0 K/UL (ref 0–0.2)
BASOPHILS NFR BLD: 0 % (ref 0–2)
BILIRUB SERPL-MCNC: 0.4 MG/DL (ref 0–1.2)
BUN SERPL-MCNC: 23 MG/DL (ref 6–23)
CALCIUM SERPL-MCNC: 8.4 MG/DL (ref 8.6–10.2)
CHLORIDE SERPL-SCNC: 110 MMOL/L (ref 98–107)
CO2 SERPL-SCNC: 20 MMOL/L (ref 22–29)
CREAT SERPL-MCNC: 1.3 MG/DL (ref 0.7–1.2)
EOSINOPHIL # BLD: 0.68 K/UL (ref 0.05–0.5)
EOSINOPHILS RELATIVE PERCENT: 7 % (ref 0–6)
ERYTHROCYTE [DISTWIDTH] IN BLOOD BY AUTOMATED COUNT: 18.6 % (ref 11.5–15)
GFR SERPL CREATININE-BSD FRML MDRD: >60 ML/MIN/1.73M2
GLUCOSE BLD-MCNC: 106 MG/DL (ref 74–99)
GLUCOSE BLD-MCNC: 113 MG/DL (ref 74–99)
GLUCOSE SERPL-MCNC: 103 MG/DL (ref 74–99)
HCT VFR BLD AUTO: 24.1 % (ref 37–54)
HCT VFR BLD AUTO: 24.5 % (ref 37–54)
HGB BLD-MCNC: 7.7 G/DL (ref 12.5–16.5)
HGB BLD-MCNC: 7.8 G/DL (ref 12.5–16.5)
LYMPHOCYTES NFR BLD: 2.63 K/UL (ref 1.5–4)
LYMPHOCYTES RELATIVE PERCENT: 27 % (ref 20–42)
MCH RBC QN AUTO: 30.7 PG (ref 26–35)
MCHC RBC AUTO-ENTMCNC: 31.4 G/DL (ref 32–34.5)
MCV RBC AUTO: 97.6 FL (ref 80–99.9)
MONOCYTES NFR BLD: 0.68 K/UL (ref 0.1–0.95)
MONOCYTES NFR BLD: 7 % (ref 2–12)
MYELOCYTES ABSOLUTE COUNT: 0.34 K/UL
MYELOCYTES: 4 %
NEUTROPHILS NFR BLD: 55 % (ref 43–80)
NEUTS SEG NFR BLD: 5.27 K/UL (ref 1.8–7.3)
NUCLEATED RED BLOOD CELLS: 3 PER 100 WBC
PLATELET # BLD AUTO: 415 K/UL (ref 130–450)
PMV BLD AUTO: 8.6 FL (ref 7–12)
POTASSIUM SERPL-SCNC: 3.9 MMOL/L (ref 3.5–5)
PROT SERPL-MCNC: 5.6 G/DL (ref 6.4–8.3)
RBC # BLD AUTO: 2.51 M/UL (ref 3.8–5.8)
RBC # BLD: ABNORMAL 10*6/UL
SODIUM SERPL-SCNC: 142 MMOL/L (ref 132–146)
WBC # BLD: ABNORMAL 10*3/UL
WBC OTHER # BLD: 9.6 K/UL (ref 4.5–11.5)

## 2024-01-17 PROCEDURE — 2580000003 HC RX 258: Performed by: STUDENT IN AN ORGANIZED HEALTH CARE EDUCATION/TRAINING PROGRAM

## 2024-01-17 PROCEDURE — 85018 HEMOGLOBIN: CPT

## 2024-01-17 PROCEDURE — 6360000002 HC RX W HCPCS: Performed by: STUDENT IN AN ORGANIZED HEALTH CARE EDUCATION/TRAINING PROGRAM

## 2024-01-17 PROCEDURE — 85025 COMPLETE CBC W/AUTO DIFF WBC: CPT

## 2024-01-17 PROCEDURE — 99232 SBSQ HOSP IP/OBS MODERATE 35: CPT | Performed by: NURSE PRACTITIONER

## 2024-01-17 PROCEDURE — 80053 COMPREHEN METABOLIC PANEL: CPT

## 2024-01-17 PROCEDURE — 36415 COLL VENOUS BLD VENIPUNCTURE: CPT

## 2024-01-17 PROCEDURE — 85014 HEMATOCRIT: CPT

## 2024-01-17 PROCEDURE — 6370000000 HC RX 637 (ALT 250 FOR IP): Performed by: STUDENT IN AN ORGANIZED HEALTH CARE EDUCATION/TRAINING PROGRAM

## 2024-01-17 PROCEDURE — C9113 INJ PANTOPRAZOLE SODIUM, VIA: HCPCS | Performed by: STUDENT IN AN ORGANIZED HEALTH CARE EDUCATION/TRAINING PROGRAM

## 2024-01-17 PROCEDURE — A4216 STERILE WATER/SALINE, 10 ML: HCPCS | Performed by: STUDENT IN AN ORGANIZED HEALTH CARE EDUCATION/TRAINING PROGRAM

## 2024-01-17 PROCEDURE — 82962 GLUCOSE BLOOD TEST: CPT

## 2024-01-17 RX ADMIN — PANTOPRAZOLE SODIUM 40 MG: 40 INJECTION, POWDER, FOR SOLUTION INTRAVENOUS at 09:19

## 2024-01-17 RX ADMIN — METOPROLOL TARTRATE 25 MG: 25 TABLET, FILM COATED ORAL at 09:18

## 2024-01-17 RX ADMIN — SODIUM CHLORIDE, PRESERVATIVE FREE 10 ML: 5 INJECTION INTRAVENOUS at 09:19

## 2024-01-17 RX ADMIN — SUCRALFATE 1 G: 1 TABLET ORAL at 05:25

## 2024-01-17 RX ADMIN — SUCRALFATE 1 G: 1 TABLET ORAL at 12:14

## 2024-01-17 RX ADMIN — AMIODARONE HYDROCHLORIDE 200 MG: 200 TABLET ORAL at 09:18

## 2024-01-17 ASSESSMENT — ENCOUNTER SYMPTOMS
PHOTOPHOBIA: 0
ABDOMINAL PAIN: 0
COUGH: 0
ABDOMINAL DISTENTION: 0
SHORTNESS OF BREATH: 0
BACK PAIN: 0
NAUSEA: 0
DIARRHEA: 0
VOMITING: 0
CHEST TIGHTNESS: 0

## 2024-01-17 NOTE — PLAN OF CARE
Problem: Safety - Adult  Goal: Free from fall injury  1/17/2024 1050 by Nidhi Blankenship RN  Outcome: Progressing  1/16/2024 2112 by Harrison Quiñones RN  Outcome: Progressing     Problem: Chronic Conditions and Co-morbidities  Goal: Patient's chronic conditions and co-morbidity symptoms are monitored and maintained or improved  1/17/2024 1050 by Nidhi Blankenship RN  Outcome: Progressing  1/16/2024 2112 by Harrison Quiñones RN  Outcome: Progressing     Problem: Pain  Goal: Verbalizes/displays adequate comfort level or baseline comfort level  1/17/2024 1050 by Nidhi Blankenship RN  Outcome: Progressing  1/16/2024 2112 by Harrison Quiñones RN  Outcome: Progressing

## 2024-01-17 NOTE — PLAN OF CARE
Problem: Safety - Adult  Goal: Free from fall injury  1/16/2024 2112 by Harrison Quiñones RN  Outcome: Progressing  1/16/2024 1034 by Nidhi Blankenship RN  Outcome: Progressing     Problem: Chronic Conditions and Co-morbidities  Goal: Patient's chronic conditions and co-morbidity symptoms are monitored and maintained or improved  1/16/2024 2112 by Harrison Quiñones RN  Outcome: Progressing  1/16/2024 1034 by Nidhi Blankenship RN  Outcome: Progressing     Problem: Pain  Goal: Verbalizes/displays adequate comfort level or baseline comfort level  1/16/2024 2112 by Harrison Quiñones RN  Outcome: Progressing  1/16/2024 1034 by Nidhi Blankenship RN  Outcome: Progressing

## 2024-01-17 NOTE — PROGRESS NOTES
4 Eyes Skin Assessment     NAME:  Cain Bennett  YOB: 1960  MEDICAL RECORD NUMBER:  [id number]    The patient is being assessed for  Admission    I agree that at least one RN has performed a thorough Head to Toe Skin Assessment on the patient. ALL assessment sites listed below have been assessed.      Areas assessed by both nurses:    Head, Face, Ears, Shoulders, Back, Chest, Arms, Elbows, Hands, Sacrum. Buttock, Coccyx, Ischium, Legs. Feet and Heels, Under Medical Devices , and Other          Does the Patient have a Wound? No noted wound(s)       Ky Prevention initiated by RN: Yes  Wound Care Orders initiated by RN: No    Pressure Injury (Stage 3,4, Unstageable, DTI, NWPT, and Complex wounds) if present, place Wound referral order by RN under : No    New Ostomies, if present place, Ostomy referral order under : No     Nurse 1 eSignature: Electronically signed by Fabienne Nunez RN on 1/15/24 at 6:15 PM EST    **SHARE this note so that the co-signing nurse can place an eSignature**    Nurse 2 eSignature: Electronically signed by Pily Gordon RN on 1/15/24 at 6:16 PM EST   
Blood administration complete, no reaction suspected, will get post transfusion H & H in 1 hr  
Internal Medicine Progress Note    Patient's name: Cain Bennett  : 1960  Chief complaints (on day of admission): Dizziness (Ongoing dizziness for 3 days)  Admission date: 1/15/2024  Date of service: 2024   Room: [address] MED SURG ONC  Primary care physician: Reza Limon MD  Reason for visit: Follow-up for dizziness melena     Subjective  Cain was seen and examined at bedside     Doing well  No new issues   No more melena or bleeding noted  Comfortable NAD  HH stable so far  Talked to him about watchmen   Spoke with cardiology, he also has an apt with them coming up   Explained his risk of tia and stroke off of OAC however also explained risk of bleeding and anemia with OAC right now, talked to Dr branch agree 48-72 hour off of OAC is best course of action at this time, patient agrees to this given the risks above     Review of Systems  There are no new complaints of chest pain, shortness of breath, abdominal pain, nausea, vomiting, diarrhea, constipation unless otherwise mentioned above.     Hospital Medications  Current Facility-Administered Medications   Medication Dose Route Frequency Provider Last Rate Last Admin    0.9 % sodium chloride infusion   IntraVENous PRN Herbert Branch MD        pantoprazole (PROTONIX) 40 mg in sodium chloride (PF) 0.9 % 10 mL injection  40 mg IntraVENous Q12H Herbert Branch MD   40 mg at 24 0919    0.9 % sodium chloride infusion   IntraVENous PRN Soheila Garcia MD        sodium chloride flush 0.9 % injection 10 mL  10 mL IntraVENous 2 times per day Duane Scales MD   10 mL at 24 0919    sodium chloride flush 0.9 % injection 10 mL  10 mL IntraVENous PRN Duane Scales MD        0.9 % sodium chloride infusion   IntraVENous PRN Duane Scales MD        potassium chloride (KLOR-CON M) extended release tablet 40 mEq  40 mEq Oral PRN Duane Scales MD        Or    potassium bicarb-citric acid (EFFER-K) effervescent tablet 40 mEq  40 mEq Oral PRN 
Lab called about 1300 H&H - still not drawn, stated they would be here as soon as possible    Perfect served Dr Branch about H&H and possible DC today    Per Dr Branch pt may DC - No eliquis for 3 days and follow up H&H next with with PCP  
Left message via answering service to KITTY Adamson regarding Hgb 6.5 after 1 unit of blood  
Notified Dr. Branch of 6.5 Hgb, 1 unit blood ordered  
Second unit of blood infusing, pt. Monitored for 15 min, no suspected reaction, will continue to monitor.  
Second unit of blood transfusion complete, no reaction suspected, vitals stable, will draw post transfusion H & H.  
Q8H PRN **OR** ondansetron (ZOFRAN) injection 4 mg, 4 mg, IntraVENous, Q6H PRN, Duane Scales MD    senna (SENOKOT) tablet 8.6 mg, 1 tablet, Oral, Daily PRN, Duane Scales MD    acetaminophen (TYLENOL) tablet 650 mg, 650 mg, Oral, Q6H PRN **OR** acetaminophen (TYLENOL) suppository 650 mg, 650 mg, Rectal, Q6H PRN, Duane Scales MD    sucralfate (CARAFATE) tablet 1 g, 1 g, Oral, 4 times per day, Duane Scales MD, 1 g at 01/17/24 0525    amiodarone (CORDARONE) tablet 200 mg, 200 mg, Oral, Daily, Duane Scales MD, 200 mg at 01/16/24 1044    insulin lispro (HUMALOG) injection vial 0-8 Units, 0-8 Units, SubCUTAneous, TID WC, Duane Scales MD    insulin lispro (HUMALOG) injection vial 0-4 Units, 0-4 Units, SubCUTAneous, Nightly, Duane Scales MD    glucose chewable tablet 16 g, 4 tablet, Oral, PRN, Duane Scales MD    dextrose bolus 10% 125 mL, 125 mL, IntraVENous, PRN **OR** dextrose bolus 10% 250 mL, 250 mL, IntraVENous, PRN, Duane Scales MD    glucagon injection 1 mg, 1 mg, SubCUTAneous, PRN, Duane Scales MD    dextrose 10 % infusion, , IntraVENous, Continuous PRN, Duane Scales MD    chlorproMAZINE (THORAZINE) tablet 25 mg, 25 mg, Oral, Q6H PRN, Duane Scales MD    metoprolol tartrate (LOPRESSOR) tablet 25 mg, 25 mg, Oral, BID, Duane Scales MD, 25 mg at 01/16/24 1956     Allergies:  Patient has no known allergies.    ROS:  Aside from what was mentioned in the past medical history and history of present illness, essentially unremarkable, all others are negative.      Recent Labs     01/15/24  0856 01/16/24  0452 01/17/24  0512   ALT 26 21 21   AST 20 16 18   ALKPHOS 48 43 47   BILITOT 0.2 0.7 0.4   LABALBU 3.5 3.3* 3.5       Lab Results   Component Value Date    WBC 9.6 01/17/2024    HGB 7.7 (L) 01/17/2024    HCT 24.5 (L) 01/17/2024     01/17/2024     01/17/2024    K 3.9 01/17/2024     (H) 01/17/2024    CREATININE 1.3 (H) 01/17/2024    BUN 23 01/17/2024    CO2 20 (L) 01/17/2024

## 2024-01-17 NOTE — DISCHARGE SUMMARY
Internal Medicine Discharge Summary    NAME: Cain Bennett :  1960  MRN:  [id number] PCP:Reza Limon MD    ADMITTED: 1/15/2024   DISCHARGED: 2024  5:42 PM    ADMITTING PHYSICIAN: No att. providers found    PCP: Reza Limon MD    CONSULTANT(S):   IP CONSULT TO INTERNAL MEDICINE  IP CONSULT TO SOCIAL WORK  IP CONSULT TO GI  IP CONSULT TO SOCIAL WORK     ADMITTING DIAGNOSIS:   Acute blood loss anemia [D62]  Dizziness [R42]  Upper GI bleed [K92.2]  Gastrointestinal hemorrhage, unspecified gastrointestinal hemorrhage type [K92.2]     Please see H&P for further details    DISCHARGE DIAGNOSES:   Active Hospital Problems    Diagnosis     Upper GI bleed [K92.2]        BRIEF HISTORY OF PRESENT ILLNESS: Cain Bennett is a 63 y.o. male patient of Reza Limon MD who  has a past medical history of Atrial fibrillation (HCC), Chronic sinusitis, Diabetes mellitus (HCC), Environmental allergies, Heart murmur, Hyperlipidemia, Hypertension, and Sleep apnea. who originally had concerns including Dizziness (Ongoing dizziness for 3 days). at presentation on 1/15/2024, and was found to have Acute blood loss anemia [D62]  Dizziness [R42]  Upper GI bleed [K92.2]  Gastrointestinal hemorrhage, unspecified gastrointestinal hemorrhage type [K92.2] after workup.    Please see H&P for further details.    HOSPITAL COURSE:   The patient presented to the hospital with the chief complaint of Dizziness (Ongoing dizziness for 3 days)  . The patient was admitted to the hospital.     Acute on chronic anemia 2/2 GI bleeding   From recent admission 2 weeks ago Iron panel Ferritin 486, Iron 91, Iron sat 34, TIBC 265, B12 524, Folate 11.5   Transfuse to maintain Hb >7   Recently CT a/p last admission showing duodenitis   PPI IV BID   Carafate   GI consult   EGD 24 has confirmed recent CT findings of duodenitis, no other acute abnormality or lesions noted   Check HH today 1 pm stable so far      Atrial fibrillation   Continue

## 2024-01-17 NOTE — CARE COORDINATION
Social Work/Case Management Transition of Care Planning (Nataliia Davis [phone]):  Per report and chart review, patient had EGD with biopsy on 1/16.  GI is following.  Patient is to remain on IV Protonix while inpatient and transition to oral upon discharge.  Patient to have colonoscopy as an outpatient. Discharge order noted.  Met with patient at bedside.  Discharge plan is to home with no needs. Wife will transport. Nataliia Davis, MAYRA  1/17/2024    
[phone]      Notes:    Factors facilitating achievement of predicted outcomes: Family support    Barriers to discharge: Limited insight into deficits    Additional Case Management Notes:     The Plan for Transition of Care is related to the following treatment goals of Acute blood loss anemia [D62]  Dizziness [R42]  Upper GI bleed [K92.2]  Gastrointestinal hemorrhage, unspecified gastrointestinal hemorrhage type [K92.2]    IF APPLICABLE: The Patient and/or patient representative Cain and his family were provided with a choice of provider and agrees with the discharge plan. Freedom of choice list with basic dialogue that supports the patient's individualized plan of care/goals and shares the quality data associated with the providers was provided to:     Patient Representative Name:       The Patient and/or Patient Representative Agree with the Discharge Plan      MAYRA Rodrigues  Case Management Department  Ph: [phone]

## 2024-01-17 NOTE — PLAN OF CARE
Problem: Safety - Adult  Goal: Free from fall injury  1/17/2024 1629 by Nidhi Blankenship RN  Outcome: Adequate for Discharge  1/17/2024 1050 by Nidhi Blankenship RN  Outcome: Progressing     Problem: Chronic Conditions and Co-morbidities  Goal: Patient's chronic conditions and co-morbidity symptoms are monitored and maintained or improved  1/17/2024 1629 by Nidhi Blankenship RN  Outcome: Adequate for Discharge  1/17/2024 1050 by Nidhi Blankenship RN  Outcome: Progressing     Problem: Pain  Goal: Verbalizes/displays adequate comfort level or baseline comfort level  1/17/2024 1629 by Nidhi Blankenship RN  Outcome: Adequate for Discharge  1/17/2024 1050 by Nidhi Blankenship RN  Outcome: Progressing

## 2024-01-19 ENCOUNTER — HOSPITAL ENCOUNTER (OUTPATIENT)
Age: 64
Discharge: HOME | End: 2024-01-19
Payer: COMMERCIAL

## 2024-01-19 DIAGNOSIS — K92.2 UPPER GI BLEED: ICD-10-CM

## 2024-01-19 LAB
ABO/RH: NORMAL
ANTIBODY SCREEN: NEGATIVE
ARM BAND NUMBER: NORMAL
BASOPHILS # BLD: 0.06 K/UL (ref 0–0.2)
BASOPHILS NFR BLD: 1 % (ref 0–2)
BLOOD BANK BLOOD PRODUCT EXPIRATION DATE: NORMAL
BLOOD BANK BLOOD PRODUCT EXPIRATION DATE: NORMAL
BLOOD BANK DISPENSE STATUS: NORMAL
BLOOD BANK ISBT PRODUCT BLOOD TYPE: 600
BLOOD BANK ISBT PRODUCT BLOOD TYPE: 6200
BLOOD BANK PRODUCT CODE: NORMAL
BLOOD BANK PRODUCT CODE: NORMAL
BLOOD BANK SAMPLE EXPIRATION: NORMAL
BLOOD BANK UNIT TYPE AND RH: NORMAL
BLOOD BANK UNIT TYPE AND RH: NORMAL
BPU ID: NORMAL
COMPONENT: NORMAL
CROSSMATCH RESULT: NORMAL
EOSINOPHIL # BLD: 0.27 K/UL (ref 0.05–0.5)
EOSINOPHILS RELATIVE PERCENT: 4 % (ref 0–6)
ERYTHROCYTE [DISTWIDTH] IN BLOOD BY AUTOMATED COUNT: 18.8 % (ref 11.5–15)
HCT VFR BLD AUTO: 26.5 % (ref 37–54)
HGB BLD-MCNC: 8.4 G/DL (ref 12.5–16.5)
IMM GRANULOCYTES # BLD AUTO: 0.29 K/UL (ref 0–0.58)
IMM GRANULOCYTES NFR BLD: 4 % (ref 0–5)
LYMPHOCYTES NFR BLD: 2.4 K/UL (ref 1.5–4)
LYMPHOCYTES RELATIVE PERCENT: 31 % (ref 20–42)
MCH RBC QN AUTO: 30.8 PG (ref 26–35)
MCHC RBC AUTO-ENTMCNC: 31.7 G/DL (ref 32–34.5)
MCV RBC AUTO: 97.1 FL (ref 80–99.9)
MONOCYTES NFR BLD: 0.55 K/UL (ref 0.1–0.95)
MONOCYTES NFR BLD: 7 % (ref 2–12)
NEUTROPHILS NFR BLD: 54 % (ref 43–80)
NEUTS SEG NFR BLD: 4.17 K/UL (ref 1.8–7.3)
PLATELET # BLD AUTO: 425 K/UL (ref 130–450)
PMV BLD AUTO: 8.6 FL (ref 7–12)
RBC # BLD AUTO: 2.73 M/UL (ref 3.8–5.8)
SURGICAL PATHOLOGY REPORT: NORMAL
TRANSFUSION STATUS: NORMAL
UNIT DIVISION: 0
UNIT ISSUE DATE/TIME: NORMAL
UNIT ISSUE DATE/TIME: NORMAL
WBC OTHER # BLD: 7.7 K/UL (ref 4.5–11.5)

## 2024-01-19 PROCEDURE — 85025 COMPLETE CBC W/AUTO DIFF WBC: CPT

## 2024-01-19 PROCEDURE — 36415 COLL VENOUS BLD VENIPUNCTURE: CPT

## 2024-01-25 ENCOUNTER — HOSPITAL ENCOUNTER (OUTPATIENT)
Age: 64
Discharge: HOME | End: 2024-01-25
Payer: COMMERCIAL

## 2024-01-25 LAB
ANION GAP SERPL CALCULATED.3IONS-SCNC: 10 MMOL/L (ref 7–16)
BUN SERPL-MCNC: 21 MG/DL (ref 6–23)
CALCIUM SERPL-MCNC: 9.7 MG/DL (ref 8.6–10.2)
CHLORIDE SERPL-SCNC: 106 MMOL/L (ref 98–107)
CO2 SERPL-SCNC: 26 MMOL/L (ref 22–29)
CREAT SERPL-MCNC: 1.4 MG/DL (ref 0.7–1.2)
ERYTHROCYTE [DISTWIDTH] IN BLOOD BY AUTOMATED COUNT: 16.3 % (ref 11.5–15)
GFR SERPL CREATININE-BSD FRML MDRD: 59 ML/MIN/1.73M2
GLUCOSE SERPL-MCNC: 155 MG/DL (ref 74–99)
HCT VFR BLD AUTO: 30.3 % (ref 37–54)
HGB BLD-MCNC: 9.4 G/DL (ref 12.5–16.5)
MCH RBC QN AUTO: 30.2 PG (ref 26–35)
MCHC RBC AUTO-ENTMCNC: 31 G/DL (ref 32–34.5)
MCV RBC AUTO: 97.4 FL (ref 80–99.9)
PLATELET # BLD AUTO: 406 K/UL (ref 130–450)
PMV BLD AUTO: 8.7 FL (ref 7–12)
POTASSIUM SERPL-SCNC: 4.3 MMOL/L (ref 3.5–5)
RBC # BLD AUTO: 3.11 M/UL (ref 3.8–5.8)
SODIUM SERPL-SCNC: 142 MMOL/L (ref 132–146)
WBC OTHER # BLD: 5.7 K/UL (ref 4.5–11.5)

## 2024-01-25 PROCEDURE — 36415 COLL VENOUS BLD VENIPUNCTURE: CPT

## 2024-01-25 PROCEDURE — 80048 BASIC METABOLIC PNL TOTAL CA: CPT

## 2024-01-25 PROCEDURE — 85027 COMPLETE CBC AUTOMATED: CPT

## 2024-01-29 RX ORDER — AMIODARONE HYDROCHLORIDE 200 MG/1
200 TABLET ORAL DAILY
Qty: 30 TABLET | Refills: 11 | Status: SHIPPED | OUTPATIENT
Start: 2024-01-29

## 2024-01-31 ENCOUNTER — PREP FOR PROCEDURE (OUTPATIENT)
Age: 64
End: 2024-01-31

## 2024-01-31 ENCOUNTER — TELEPHONE (OUTPATIENT)
Age: 64
End: 2024-01-31

## 2024-01-31 ENCOUNTER — HOSPITAL ENCOUNTER (OUTPATIENT)
Age: 64
Discharge: HOME | End: 2024-01-31
Payer: COMMERCIAL

## 2024-01-31 ENCOUNTER — OFFICE VISIT (OUTPATIENT)
Age: 64
End: 2024-01-31
Payer: COMMERCIAL

## 2024-01-31 VITALS
WEIGHT: 239 LBS | RESPIRATION RATE: 16 BRPM | HEART RATE: 66 BPM | BODY MASS INDEX: 31.68 KG/M2 | SYSTOLIC BLOOD PRESSURE: 126 MMHG | DIASTOLIC BLOOD PRESSURE: 70 MMHG | OXYGEN SATURATION: 98 % | HEIGHT: 73 IN | TEMPERATURE: 97 F

## 2024-01-31 DIAGNOSIS — D64.9 ANEMIA, UNSPECIFIED TYPE: ICD-10-CM

## 2024-01-31 DIAGNOSIS — D64.9 ANEMIA, UNSPECIFIED TYPE: Primary | ICD-10-CM

## 2024-01-31 DIAGNOSIS — Z12.11 SCREEN FOR COLON CANCER: ICD-10-CM

## 2024-01-31 DIAGNOSIS — K92.2 UPPER GI BLEED: ICD-10-CM

## 2024-01-31 DIAGNOSIS — Z12.11 COLON CANCER SCREENING: ICD-10-CM

## 2024-01-31 PROBLEM — K25.9 GASTRIC ULCER: Status: ACTIVE | Noted: 2024-01-31

## 2024-01-31 LAB
BASOPHILS # BLD: 0.06 K/UL (ref 0–0.2)
BASOPHILS NFR BLD: 1 % (ref 0–2)
EOSINOPHIL # BLD: 0.3 K/UL (ref 0.05–0.5)
EOSINOPHILS RELATIVE PERCENT: 4 % (ref 0–6)
ERYTHROCYTE [DISTWIDTH] IN BLOOD BY AUTOMATED COUNT: 15 % (ref 11.5–15)
HCT VFR BLD AUTO: 30.7 % (ref 37–54)
HGB BLD-MCNC: 9.9 G/DL (ref 12.5–16.5)
IMM GRANULOCYTES # BLD AUTO: 0.03 K/UL (ref 0–0.58)
IMM GRANULOCYTES NFR BLD: 0 % (ref 0–5)
LYMPHOCYTES NFR BLD: 2.03 K/UL (ref 1.5–4)
LYMPHOCYTES RELATIVE PERCENT: 30 % (ref 20–42)
MCH RBC QN AUTO: 30.7 PG (ref 26–35)
MCHC RBC AUTO-ENTMCNC: 32.2 G/DL (ref 32–34.5)
MCV RBC AUTO: 95 FL (ref 80–99.9)
MONOCYTES NFR BLD: 0.71 K/UL (ref 0.1–0.95)
MONOCYTES NFR BLD: 11 % (ref 2–12)
NEUTROPHILS NFR BLD: 54 % (ref 43–80)
NEUTS SEG NFR BLD: 3.66 K/UL (ref 1.8–7.3)
PLATELET # BLD AUTO: 311 K/UL (ref 130–450)
PMV BLD AUTO: 8.1 FL (ref 7–12)
RBC # BLD AUTO: 3.23 M/UL (ref 3.8–5.8)
WBC OTHER # BLD: 6.8 K/UL (ref 4.5–11.5)

## 2024-01-31 PROCEDURE — 3078F DIAST BP <80 MM HG: CPT | Performed by: NURSE PRACTITIONER

## 2024-01-31 PROCEDURE — 3074F SYST BP LT 130 MM HG: CPT | Performed by: NURSE PRACTITIONER

## 2024-01-31 PROCEDURE — 99212 OFFICE O/P EST SF 10 MIN: CPT | Performed by: NURSE PRACTITIONER

## 2024-01-31 PROCEDURE — 36415 COLL VENOUS BLD VENIPUNCTURE: CPT

## 2024-01-31 PROCEDURE — 85025 COMPLETE CBC W/AUTO DIFF WBC: CPT

## 2024-01-31 RX ORDER — POLYETHYLENE GLYCOL 3350, SODIUM SULFATE ANHYDROUS, SODIUM BICARBONATE, SODIUM CHLORIDE, POTASSIUM CHLORIDE 236; 22.74; 6.74; 5.86; 2.97 G/4L; G/4L; G/4L; G/4L; G/4L
3350 POWDER, FOR SOLUTION ORAL DAILY
Qty: 3350 ML | Refills: 0 | Status: SHIPPED | OUTPATIENT
Start: 2024-01-31

## 2024-01-31 NOTE — TELEPHONE ENCOUNTER
Prior Authorization Form:      DEMOGRAPHICS:                     Patient Name:  Cain Bennett  Patient :  1960            Insurance:  Payor: [employer] / Plan: [employer] TRADITIONAL / Product Type: *No Product type* /   Insurance ID Number:    Payer/Plan Subscr  Sex Relation Sub. Ins. ID Effective Group Num   1. MEDICAL CAIN BARRON 1960 Male Self [id number]                                   [address]   2. MEDICAL WOODROW BARRON 1960 Female Spouse [id number]                                   [address]         DIAGNOSIS & PROCEDURE:                       Procedure/Operation: EGD, SCREENING COLONOSCOPY           CPT Code: 72275, 79797    Diagnosis:  GASTRIC ULCER, SCREEN FOR COLON CANCER    ICD10 Code: K25.9, Z12.11    Location:  [facility]    Surgeon:  KATHRYN    SCHEDULING INFORMATION:                          Date: 24    Time: 730AM              Anesthesia:  MAC/TIVA                                                       Status:  Outpatient        Special Comments:         Electronically signed by Angelina Briscoe MA on 2024 at 11:25 AM

## 2024-01-31 NOTE — PROGRESS NOTES
Cain Bennett (:  1960) is a 63 y.o. male, here for evaluation of the following chief complaint(s):  Follow-up (2 month follow up )      SUBJECTIVE/OBJECTIVE:  HPI:    Cain is a very pleasant 63 year old gentleman that presents today for hospital follow up for anemia and dark tarry stools    Patient presented to the ER for dizziness  On admission 1/15/24, patients HGB was 6.6  Had 2 blood transfusions  Dr. Branch was consulted.    EGD-    Impression: Normal esophagus. Z-line regular, 41 cm from the incisors. Normal stomach. Biopsied for H pylori. Duodenitis, characterized by erythema, congestion (edema), friability and granularity with the appearance of the start of duodenal cratered ulceration. Biopsied.     -- Diagnosis --   A.          Duodenum: Chronic peptic duodenitis   B.     Stomach: Mild chronic gastritis without intestinal metaplasia.     Last HGB was 9.4 (24)    Patient tells me the dizziness has resolved  Taking Pantoprazole 40 mg daily since being released from the hospital  Has stopped Ibuprofen; admits to taking it daily  Taking Carafate with meals     Patient will need colon screening and repeat EGD in August of this year       ROS:  General: Patient denies n/v/f/c or weight loss.  HEENT: Patient denies persistent postnasal drip, scleral icterus, drooling, persistent bleeding from nose/mouth.  Resp: Patient denies SOB, wheezing, productive cough.  Cards: Patient denies CP, palpitations, significant edema  GI: As above.  Derm: Patient denies jaundice/rashes.   Musc: Patient denies diffuse/irregular joint swelling or myalgias.      Objective   Wt Readings from Last 3 Encounters:   24 108.4 kg (239 lb)   01/15/24 107.5 kg (237 lb)   24 107.5 kg (237 lb)     Temp Readings from Last 3 Encounters:   24 97 °F (36.1 °C) (Temporal)   24 97.1 °F (36.2 °C) (Temporal)   24 97.3 °F (36.3 °C) (Infrared)     BP Readings from Last 3 Encounters:   24 126/70

## 2024-02-05 ENCOUNTER — OFFICE VISIT (OUTPATIENT)
Dept: CARDIOLOGY CLINIC | Age: 64
End: 2024-02-05
Payer: COMMERCIAL

## 2024-02-05 VITALS
HEIGHT: 72 IN | BODY MASS INDEX: 33 KG/M2 | HEART RATE: 65 BPM | DIASTOLIC BLOOD PRESSURE: 70 MMHG | RESPIRATION RATE: 18 BRPM | WEIGHT: 243.6 LBS | SYSTOLIC BLOOD PRESSURE: 120 MMHG

## 2024-02-05 DIAGNOSIS — I48.0 PAROXYSMAL ATRIAL FIBRILLATION (HCC): Primary | ICD-10-CM

## 2024-02-05 PROCEDURE — 3078F DIAST BP <80 MM HG: CPT | Performed by: INTERNAL MEDICINE

## 2024-02-05 PROCEDURE — 99215 OFFICE O/P EST HI 40 MIN: CPT | Performed by: INTERNAL MEDICINE

## 2024-02-05 PROCEDURE — 3074F SYST BP LT 130 MM HG: CPT | Performed by: INTERNAL MEDICINE

## 2024-02-05 PROCEDURE — 93000 ELECTROCARDIOGRAM COMPLETE: CPT | Performed by: INTERNAL MEDICINE

## 2024-02-05 NOTE — PATIENT INSTRUCTIONS
No need to take aspirin and there is no indication for ASA at this time.   Continue amiodarone 200 mg for another one month  Continue Eliquis 5 mg po bid for anticoagulation, if he is not able to obtain Eliquis through his insurance then he take warfarin for anticoagulation   Will plan for stress test down the road for antiarrhythmic selection.   Continue metoprolol 25 mg po twice a day for rate control  Continue rosuvastatin 10 mg po daily and fenofibrate   Advised to avoid otc NSAIDS   Continue rest of the medications as per Dr. Ulrich  Follow up with me in 3 months.

## 2024-02-05 NOTE — PROGRESS NOTES
meals)       No current facility-administered medications for this visit.       Physical Exam:  /70   Pulse 65   Resp 18   Ht 1.829 m (6')   Wt 110.5 kg (243 lb 9.6 oz)   BMI 33.04 kg/m²   Wt Readings from Last 3 Encounters:   02/05/24 110.5 kg (243 lb 9.6 oz)   01/31/24 108.4 kg (239 lb)   01/15/24 107.5 kg (237 lb)     Appearance: Awake, alert and oriented x 3, no acute respiratory distress  Skin: Intact, no rash  Head: Normocephalic, atraumatic  Eyes: EOMI, no conjunctival erythema  ENMT: No pharyngeal erythema, MMM, no rhinorrhea  Neck: Supple, no elevated JVP, no carotid bruits  Lungs: Clear to auscultation bilaterally. No wheezes, rales, or rhonchi.  Cardiac: Regular rate and rhythm, +S1S2, no murmurs apparent  Abdomen: Soft, nontender, +bowel sounds  Extremities: Moves all extremities x 4, trace  lower extremity edema  Neurologic: No focal motor deficits apparent, normal mood and affect, alert and oriented x 3  Peripheral Pulses: Intact posterior tibial pulses bilaterally    Laboratory Tests:  Lab Results   Component Value Date    CREATININE 1.4 (H) 01/25/2024    BUN 21 01/25/2024     01/25/2024    K 4.3 01/25/2024     01/25/2024    CO2 26 01/25/2024     Lab Results   Component Value Date/Time    MG 2.4 12/29/2023 04:59 AM     Lab Results   Component Value Date    WBC 6.8 01/31/2024    HGB 9.9 (L) 01/31/2024    HCT 30.7 (L) 01/31/2024    MCV 95.0 01/31/2024     01/31/2024     Lab Results   Component Value Date    ALT 21 01/17/2024    AST 18 01/17/2024    ALKPHOS 47 01/17/2024    BILITOT 0.4 01/17/2024     Lab Results   Component Value Date    CKTOTAL 534 (H) 12/27/2023     No results found for: \"INR\", \"PROTIME\"  Lab Results   Component Value Date    TSH 0.52 12/28/2023     Lab Results   Component Value Date    LABA1C 6.6 (H) 12/27/2023     No results found for: \"EAG\"  Lab Results   Component Value Date    CHOL 151 12/02/2023    CHOL 144 05/26/2023    CHOL 152 10/07/2022     Lab

## 2024-03-01 PROBLEM — Z12.11 SCREEN FOR COLON CANCER: Status: RESOLVED | Noted: 2024-01-31 | Resolved: 2024-03-01

## 2024-03-04 ENCOUNTER — HOSPITAL ENCOUNTER (OUTPATIENT)
Age: 64
Discharge: HOME | End: 2024-03-04
Payer: COMMERCIAL

## 2024-03-04 DIAGNOSIS — D64.9 ANEMIA, UNSPECIFIED TYPE: ICD-10-CM

## 2024-03-04 LAB
BASOPHILS # BLD: 0.07 K/UL (ref 0–0.2)
BASOPHILS NFR BLD: 1 % (ref 0–2)
EOSINOPHIL # BLD: 0.48 K/UL (ref 0.05–0.5)
EOSINOPHILS RELATIVE PERCENT: 6 % (ref 0–6)
ERYTHROCYTE [DISTWIDTH] IN BLOOD BY AUTOMATED COUNT: 14.1 % (ref 11.5–15)
HCT VFR BLD AUTO: 31.7 % (ref 37–54)
HGB BLD-MCNC: 10.2 G/DL (ref 12.5–16.5)
IMM GRANULOCYTES # BLD AUTO: 0.04 K/UL (ref 0–0.58)
IMM GRANULOCYTES NFR BLD: 1 % (ref 0–5)
LYMPHOCYTES NFR BLD: 2.6 K/UL (ref 1.5–4)
LYMPHOCYTES RELATIVE PERCENT: 35 % (ref 20–42)
MCH RBC QN AUTO: 27.7 PG (ref 26–35)
MCHC RBC AUTO-ENTMCNC: 32.2 G/DL (ref 32–34.5)
MCV RBC AUTO: 86.1 FL (ref 80–99.9)
MONOCYTES NFR BLD: 0.8 K/UL (ref 0.1–0.95)
MONOCYTES NFR BLD: 11 % (ref 2–12)
NEUTROPHILS NFR BLD: 47 % (ref 43–80)
NEUTS SEG NFR BLD: 3.46 K/UL (ref 1.8–7.3)
PLATELET # BLD AUTO: 348 K/UL (ref 130–450)
PMV BLD AUTO: 8.8 FL (ref 7–12)
RBC # BLD AUTO: 3.68 M/UL (ref 3.8–5.8)
WBC OTHER # BLD: 7.5 K/UL (ref 4.5–11.5)

## 2024-03-04 PROCEDURE — 36415 COLL VENOUS BLD VENIPUNCTURE: CPT

## 2024-03-04 PROCEDURE — 85025 COMPLETE CBC W/AUTO DIFF WBC: CPT

## 2024-03-28 ENCOUNTER — HOSPITAL ENCOUNTER (OUTPATIENT)
Age: 64
Discharge: HOME | End: 2024-03-28
Payer: COMMERCIAL

## 2024-03-28 ENCOUNTER — OFFICE VISIT (OUTPATIENT)
Dept: FAMILY MEDICINE CLINIC | Age: 64
End: 2024-03-28
Payer: COMMERCIAL

## 2024-03-28 VITALS
WEIGHT: 247 LBS | TEMPERATURE: 99.3 F | BODY MASS INDEX: 33.46 KG/M2 | HEART RATE: 91 BPM | DIASTOLIC BLOOD PRESSURE: 78 MMHG | OXYGEN SATURATION: 96 % | HEIGHT: 72 IN | SYSTOLIC BLOOD PRESSURE: 136 MMHG

## 2024-03-28 DIAGNOSIS — U07.1 COVID-19: Primary | ICD-10-CM

## 2024-03-28 DIAGNOSIS — R05.9 COUGH, UNSPECIFIED TYPE: ICD-10-CM

## 2024-03-28 LAB
ANION GAP SERPL CALCULATED.3IONS-SCNC: 12 MMOL/L (ref 7–16)
BASOPHILS # BLD: 0.08 K/UL (ref 0–0.2)
BASOPHILS NFR BLD: 1 % (ref 0–2)
BUN SERPL-MCNC: 25 MG/DL (ref 6–23)
CALCIUM SERPL-MCNC: 10.4 MG/DL (ref 8.6–10.2)
CHLORIDE SERPL-SCNC: 103 MMOL/L (ref 98–107)
CO2 SERPL-SCNC: 26 MMOL/L (ref 22–29)
CREAT SERPL-MCNC: 1.5 MG/DL (ref 0.7–1.2)
EOSINOPHIL # BLD: 0.62 K/UL (ref 0.05–0.5)
EOSINOPHILS RELATIVE PERCENT: 6 % (ref 0–6)
ERYTHROCYTE [DISTWIDTH] IN BLOOD BY AUTOMATED COUNT: 15.1 % (ref 11.5–15)
GFR SERPL CREATININE-BSD FRML MDRD: 53 ML/MIN/1.73M2
GLUCOSE SERPL-MCNC: 103 MG/DL (ref 74–99)
HCT VFR BLD AUTO: 35.5 % (ref 37–54)
HGB BLD-MCNC: 11.2 G/DL (ref 12.5–16.5)
IMM GRANULOCYTES # BLD AUTO: 0.05 K/UL (ref 0–0.58)
IMM GRANULOCYTES NFR BLD: 1 % (ref 0–5)
INFLUENZA A ANTIBODY: NORMAL
INFLUENZA B ANTIBODY: NORMAL
LYMPHOCYTES NFR BLD: 1.95 K/UL (ref 1.5–4)
LYMPHOCYTES RELATIVE PERCENT: 18 % (ref 20–42)
Lab: ABNORMAL
MCH RBC QN AUTO: 26 PG (ref 26–35)
MCHC RBC AUTO-ENTMCNC: 31.5 G/DL (ref 32–34.5)
MCV RBC AUTO: 82.6 FL (ref 80–99.9)
MONOCYTES NFR BLD: 1.66 K/UL (ref 0.1–0.95)
MONOCYTES NFR BLD: 16 % (ref 2–12)
NEUTROPHILS NFR BLD: 59 % (ref 43–80)
NEUTS SEG NFR BLD: 6.25 K/UL (ref 1.8–7.3)
PERFORMING INSTRUMENT: ABNORMAL
PLATELET # BLD AUTO: 347 K/UL (ref 130–450)
PMV BLD AUTO: 8.6 FL (ref 7–12)
POTASSIUM SERPL-SCNC: 4.7 MMOL/L (ref 3.5–5)
QC PASS/FAIL: ABNORMAL
RBC # BLD AUTO: 4.3 M/UL (ref 3.8–5.8)
RSV ANTIGEN: NORMAL
SARS-COV-2, POC: DETECTED
SODIUM SERPL-SCNC: 141 MMOL/L (ref 132–146)
WBC OTHER # BLD: 10.6 K/UL (ref 4.5–11.5)

## 2024-03-28 PROCEDURE — 99204 OFFICE O/P NEW MOD 45 MIN: CPT | Performed by: PHYSICIAN ASSISTANT

## 2024-03-28 PROCEDURE — 86756 RESPIRATORY VIRUS ANTIBODY: CPT | Performed by: PHYSICIAN ASSISTANT

## 2024-03-28 PROCEDURE — 3006F CXR DOC REV: CPT | Performed by: PHYSICIAN ASSISTANT

## 2024-03-28 PROCEDURE — 85025 COMPLETE CBC W/AUTO DIFF WBC: CPT

## 2024-03-28 PROCEDURE — 36415 COLL VENOUS BLD VENIPUNCTURE: CPT

## 2024-03-28 PROCEDURE — 3075F SYST BP GE 130 - 139MM HG: CPT | Performed by: PHYSICIAN ASSISTANT

## 2024-03-28 PROCEDURE — 80048 BASIC METABOLIC PNL TOTAL CA: CPT

## 2024-03-28 PROCEDURE — 87804 INFLUENZA ASSAY W/OPTIC: CPT | Performed by: PHYSICIAN ASSISTANT

## 2024-03-28 PROCEDURE — 3078F DIAST BP <80 MM HG: CPT | Performed by: PHYSICIAN ASSISTANT

## 2024-03-28 PROCEDURE — 87426 SARSCOV CORONAVIRUS AG IA: CPT | Performed by: PHYSICIAN ASSISTANT

## 2024-03-28 NOTE — PROGRESS NOTES
3/28/24  Cain Bennett : 1960 Sex: male  Age 63 y.o.      Subjective:  Chief Complaint   Patient presents with    Sinus Problem         HPI:   HPI  Cain Bennett , 63 y.o. male presents to express care for evaluation of sinus congestion, drainage, cough.  The patient has had the symptoms ongoing since about Saturday or  of this week.  No fevers.  The patient has had some increased congestion, drainage.  The patient does have a history of sinus infection.  The patient was recently admitted to different times in December and in January for upper respiratory symptoms.  In January he had been diagnosed with coronavirus, not COVID-19.  The patient also had renal failure and anemia at that time.  The patient has been following with nephrology as well as PCP and has continued to monitor his hemoglobin which does seem to be improving.  The patient also had creatinine that was significantly abnormal and had returned to baseline but now seems to be slowly increasing.      ROS:   Unless otherwise stated in this report the patient's positive and negative responses for review of systems for constitutional, eyes, ENT, cardiovascular, respiratory, gastrointestinal, neurological, , musculoskeletal, and integument systems and related systems to the presenting problem are either stated in the history of present illness or were not pertinent or were negative for the symptoms and/or complaints related to the presenting medical problem.  Positives and pertinent negatives as per HPI.  All others reviewed and are negative.      PMH:     Past Medical History:   Diagnosis Date    Atrial fibrillation (HCC)     Chronic sinusitis     disease - for surgical encounter 14    Diabetes mellitus (HCC)     dx 10/13; type II    Environmental allergies     Heart murmur     Hyperlipidemia     Hypertension     stable per patient    Sleep apnea     uses c pap       Past Surgical History:   Procedure Laterality Date

## 2024-03-29 NOTE — RESULT ENCOUNTER NOTE
Laboratory studies showed elevation of creatinine to 1.5 from 1.4 about 2 months ago.    BUN was elevated.  It may be more result flexion of hydration status.  Please have the patient push the fluids but would have him follow-up with his nephrologist.    His hemoglobin does seem to be increasing to 11.2 from 10.23 weeks ago.  Normal white blood cell count.  Please have the patient follow-up with PCP.

## 2024-04-14 ENCOUNTER — APPOINTMENT (OUTPATIENT)
Dept: CT IMAGING | Age: 64
End: 2024-04-14
Payer: COMMERCIAL

## 2024-04-14 ENCOUNTER — APPOINTMENT (OUTPATIENT)
Dept: GENERAL RADIOLOGY | Age: 64
End: 2024-04-14
Payer: COMMERCIAL

## 2024-04-14 ENCOUNTER — HOSPITAL ENCOUNTER (EMERGENCY)
Age: 64
Discharge: HOME | End: 2024-04-14
Attending: EMERGENCY MEDICINE
Payer: COMMERCIAL

## 2024-04-14 VITALS
TEMPERATURE: 98.1 F | OXYGEN SATURATION: 96 % | HEART RATE: 88 BPM | DIASTOLIC BLOOD PRESSURE: 72 MMHG | SYSTOLIC BLOOD PRESSURE: 134 MMHG | RESPIRATION RATE: 17 BRPM

## 2024-04-14 DIAGNOSIS — R10.9 ABDOMINAL PAIN, UNSPECIFIED ABDOMINAL LOCATION: ICD-10-CM

## 2024-04-14 DIAGNOSIS — R19.7 DIARRHEA, UNSPECIFIED TYPE: Primary | ICD-10-CM

## 2024-04-14 DIAGNOSIS — E86.0 DEHYDRATION: ICD-10-CM

## 2024-04-14 LAB
ALBUMIN SERPL-MCNC: 5.2 G/DL (ref 3.5–5.2)
ALP SERPL-CCNC: 60 U/L (ref 40–129)
ALT SERPL-CCNC: 18 U/L (ref 0–40)
ANION GAP SERPL CALCULATED.3IONS-SCNC: 18 MMOL/L (ref 7–16)
AST SERPL-CCNC: 19 U/L (ref 0–39)
BACTERIA URNS QL MICRO: ABNORMAL
BASOPHILS # BLD: 0.05 K/UL (ref 0–0.2)
BASOPHILS NFR BLD: 1 % (ref 0–2)
BILIRUB SERPL-MCNC: 0.4 MG/DL (ref 0–1.2)
BILIRUB UR QL STRIP: ABNORMAL
BUN SERPL-MCNC: 35 MG/DL (ref 6–23)
CALCIUM SERPL-MCNC: 9.6 MG/DL (ref 8.6–10.2)
CASTS #/AREA URNS LPF: ABNORMAL /LPF
CHLORIDE SERPL-SCNC: 102 MMOL/L (ref 98–107)
CLARITY UR: CLEAR
CO2 SERPL-SCNC: 17 MMOL/L (ref 22–29)
COLOR UR: YELLOW
CREAT SERPL-MCNC: 1.5 MG/DL (ref 0.7–1.2)
EOSINOPHIL # BLD: 0.14 K/UL (ref 0.05–0.5)
EOSINOPHILS RELATIVE PERCENT: 1 % (ref 0–6)
ERYTHROCYTE [DISTWIDTH] IN BLOOD BY AUTOMATED COUNT: 15.7 % (ref 11.5–15)
GFR SERPL CREATININE-BSD FRML MDRD: 53 ML/MIN/1.73M2
GLUCOSE SERPL-MCNC: 168 MG/DL (ref 74–99)
GLUCOSE UR STRIP-MCNC: NEGATIVE MG/DL
HCT VFR BLD AUTO: 41.3 % (ref 37–54)
HGB BLD-MCNC: 12.7 G/DL (ref 12.5–16.5)
HGB UR QL STRIP.AUTO: NEGATIVE
IMM GRANULOCYTES # BLD AUTO: 0.03 K/UL (ref 0–0.58)
IMM GRANULOCYTES NFR BLD: 0 % (ref 0–5)
KETONES UR STRIP-MCNC: NEGATIVE MG/DL
LACTATE BLDV-SCNC: 1.8 MMOL/L (ref 0.5–2.2)
LACTATE BLDV-SCNC: 2.8 MMOL/L (ref 0.5–2.2)
LEUKOCYTE ESTERASE UR QL STRIP: NEGATIVE
LIPASE SERPL-CCNC: 41 U/L (ref 13–60)
LYMPHOCYTES NFR BLD: 0.85 K/UL (ref 1.5–4)
LYMPHOCYTES RELATIVE PERCENT: 8 % (ref 20–42)
MCH RBC QN AUTO: 25.3 PG (ref 26–35)
MCHC RBC AUTO-ENTMCNC: 30.8 G/DL (ref 32–34.5)
MCV RBC AUTO: 82.4 FL (ref 80–99.9)
MONOCYTES NFR BLD: 0.97 K/UL (ref 0.1–0.95)
MONOCYTES NFR BLD: 9 % (ref 2–12)
NEUTROPHILS NFR BLD: 81 % (ref 43–80)
NEUTS SEG NFR BLD: 8.63 K/UL (ref 1.8–7.3)
NITRITE UR QL STRIP: NEGATIVE
PH UR STRIP: 6 [PH] (ref 5–9)
PLATELET # BLD AUTO: 555 K/UL (ref 130–450)
PMV BLD AUTO: 8.8 FL (ref 7–12)
POTASSIUM SERPL-SCNC: 3.8 MMOL/L (ref 3.5–5)
PROT SERPL-MCNC: 8.6 G/DL (ref 6.4–8.3)
PROT UR STRIP-MCNC: 100 MG/DL
RBC # BLD AUTO: 5.01 M/UL (ref 3.8–5.8)
RBC #/AREA URNS HPF: ABNORMAL /HPF
SODIUM SERPL-SCNC: 137 MMOL/L (ref 132–146)
SP GR UR STRIP: >1.03 (ref 1–1.03)
TROPONIN I SERPL HS-MCNC: 17 NG/L (ref 0–11)
TROPONIN I SERPL HS-MCNC: 18 NG/L (ref 0–11)
UROBILINOGEN UR STRIP-ACNC: 0.2 EU/DL (ref 0–1)
WBC #/AREA URNS HPF: ABNORMAL /HPF
WBC OTHER # BLD: 10.7 K/UL (ref 4.5–11.5)

## 2024-04-14 PROCEDURE — 80053 COMPREHEN METABOLIC PANEL: CPT

## 2024-04-14 PROCEDURE — 99285 EMERGENCY DEPT VISIT HI MDM: CPT

## 2024-04-14 PROCEDURE — 87077 CULTURE AEROBIC IDENTIFY: CPT

## 2024-04-14 PROCEDURE — 87449 NOS EACH ORGANISM AG IA: CPT

## 2024-04-14 PROCEDURE — 84484 ASSAY OF TROPONIN QUANT: CPT

## 2024-04-14 PROCEDURE — 87427 SHIGA-LIKE TOXIN AG IA: CPT

## 2024-04-14 PROCEDURE — 2580000003 HC RX 258: Performed by: NURSE PRACTITIONER

## 2024-04-14 PROCEDURE — 74177 CT ABD & PELVIS W/CONTRAST: CPT

## 2024-04-14 PROCEDURE — 6360000004 HC RX CONTRAST MEDICATION: Performed by: RADIOLOGY

## 2024-04-14 PROCEDURE — 87324 CLOSTRIDIUM AG IA: CPT

## 2024-04-14 PROCEDURE — 71046 X-RAY EXAM CHEST 2 VIEWS: CPT

## 2024-04-14 PROCEDURE — 93005 ELECTROCARDIOGRAM TRACING: CPT | Performed by: NURSE PRACTITIONER

## 2024-04-14 PROCEDURE — 87046 STOOL CULTR AEROBIC BACT EA: CPT

## 2024-04-14 PROCEDURE — 83605 ASSAY OF LACTIC ACID: CPT

## 2024-04-14 PROCEDURE — 81001 URINALYSIS AUTO W/SCOPE: CPT

## 2024-04-14 PROCEDURE — 85025 COMPLETE CBC W/AUTO DIFF WBC: CPT

## 2024-04-14 PROCEDURE — 87045 FECES CULTURE AEROBIC BACT: CPT

## 2024-04-14 PROCEDURE — 83690 ASSAY OF LIPASE: CPT

## 2024-04-14 RX ORDER — 0.9 % SODIUM CHLORIDE 0.9 %
1000 INTRAVENOUS SOLUTION INTRAVENOUS ONCE
Status: COMPLETED | OUTPATIENT
Start: 2024-04-14 | End: 2024-04-14

## 2024-04-14 RX ORDER — ONDANSETRON 2 MG/ML
4 INJECTION INTRAMUSCULAR; INTRAVENOUS ONCE
Status: DISCONTINUED | OUTPATIENT
Start: 2024-04-14 | End: 2024-04-14 | Stop reason: HOSPADM

## 2024-04-14 RX ADMIN — SODIUM CHLORIDE 1000 ML: 9 INJECTION, SOLUTION INTRAVENOUS at 10:58

## 2024-04-14 RX ADMIN — IOPAMIDOL 75 ML: 755 INJECTION, SOLUTION INTRAVENOUS at 12:31

## 2024-04-14 ASSESSMENT — PAIN - FUNCTIONAL ASSESSMENT: PAIN_FUNCTIONAL_ASSESSMENT: NONE - DENIES PAIN

## 2024-04-14 NOTE — ED PROVIDER NOTES
need to immediately return to the ER. Written information was included in their discharge instructions. Additional verbal discharge instructions were also given and discussed with the patient to supplement those generated by the EMR. We also discussed medications that were prescribed  (if any) including common side effects and interactions. The patient was advised to abstain from driving, operating heavy machinery or making significant decisions while taking medications such as opiates and muscle relaxers that may impair this. All questions were addressed.  They understand return precautions and discharge instructions. The patient and spouse / life partner  expressed understanding. Vitals were stable and they were in no distress at discharge.       Plan of Care/Counseling:  ARIANA Flores CNP and EM Attending Physician reviewed today's visit with the patient and spouse / life partner in addition to providing specific details for the plan of care and counseling regarding the diagnosis and prognosis.  Questions are answered at this time and are agreeable with the plan.    Assessment      1. Diarrhea, unspecified type    2. Dehydration    3. Abdominal pain, unspecified abdominal location      Plan   Discharged home.  Patient condition is stable    New Medications     Discharge Medication List as of 4/14/2024  2:43 PM        Electronically signed by ARIANA Flores CNP   DD: 4/14/24  **This report was transcribed using voice recognition software. Every effort was made to ensure accuracy; however, inadvertent computerized transcription errors may be present.  END OF ED PROVIDER NOTE       Shahram Giraldo APRN - CNP  04/14/24 4252

## 2024-04-15 LAB
C DIFF GDH + TOXINS A+B STL QL IA.RAPID: NEGATIVE
EKG ATRIAL RATE: 82 BPM
EKG P AXIS: 66 DEGREES
EKG P-R INTERVAL: 226 MS
EKG Q-T INTERVAL: 400 MS
EKG QRS DURATION: 108 MS
EKG QTC CALCULATION (BAZETT): 467 MS
EKG R AXIS: 73 DEGREES
EKG T AXIS: 87 DEGREES
EKG VENTRICULAR RATE: 82 BPM
SPECIMEN DESCRIPTION: NORMAL

## 2024-04-15 PROCEDURE — 93010 ELECTROCARDIOGRAM REPORT: CPT | Performed by: INTERNAL MEDICINE

## 2024-04-17 LAB
MICROORGANISM SPEC CULT: NORMAL
MICROORGANISM SPEC CULT: NORMAL
SPECIMEN DESCRIPTION: NORMAL

## 2024-05-16 ENCOUNTER — OFFICE VISIT (OUTPATIENT)
Dept: CARDIOLOGY CLINIC | Age: 64
End: 2024-05-16
Payer: COMMERCIAL

## 2024-05-16 VITALS
BODY MASS INDEX: 33.44 KG/M2 | HEART RATE: 80 BPM | DIASTOLIC BLOOD PRESSURE: 78 MMHG | HEIGHT: 72 IN | SYSTOLIC BLOOD PRESSURE: 142 MMHG | WEIGHT: 246.9 LBS | RESPIRATION RATE: 16 BRPM

## 2024-05-16 DIAGNOSIS — R94.31 ABNORMAL ELECTROCARDIOGRAPHY: ICD-10-CM

## 2024-05-16 DIAGNOSIS — I48.0 PAROXYSMAL ATRIAL FIBRILLATION (HCC): Primary | ICD-10-CM

## 2024-05-16 PROCEDURE — 3077F SYST BP >= 140 MM HG: CPT | Performed by: INTERNAL MEDICINE

## 2024-05-16 PROCEDURE — 99214 OFFICE O/P EST MOD 30 MIN: CPT | Performed by: INTERNAL MEDICINE

## 2024-05-16 PROCEDURE — 3078F DIAST BP <80 MM HG: CPT | Performed by: INTERNAL MEDICINE

## 2024-05-16 PROCEDURE — 93000 ELECTROCARDIOGRAM COMPLETE: CPT | Performed by: INTERNAL MEDICINE

## 2024-05-16 RX ORDER — AMLODIPINE BESYLATE 5 MG
5 TABLET ORAL DAILY
Qty: 30 TABLET | Refills: 3
Start: 2024-05-16

## 2024-05-16 NOTE — PATIENT INSTRUCTIONS
Will schedule for an exercise nuclear stress test to rule out ischemia prior to her antiarrhythmic selection.  If there is no evidence of ischemia then I would recommend adding flecainide for rhythm control  Blood pressure is elevated we will start him on Norvasc 5 mg p.o. daily.  Advised to monitor blood pressure heart rate daily and call me in 1 week.  Continue Eliquis 5 mg po bid for anticoagulation   Continue metoprolol 25 mg po twice a day for rate control  Continue rosuvastatin 10 mg po daily and fenofibrate   Advised to avoid otc NSAIDS   Continue rest of the medications as per Dr. Ulrich  Follow up with me in 6 months.

## 2024-05-16 NOTE — PROGRESS NOTES
OUTPATIENT CARDIOLOGY FOLLOW-UP    Name: Cain Bennett    Age: 63 y.o.    Primary Care Physician: Reza Limon MD    Date of Service: 5/16/2024    Chief Complaint:   Chief Complaint   Patient presents with    Follow-up     3 month follow up.       Interim History:   Mr. Bennett is a 63-year-old  male with history of hypertension, hyperlipidemia, moderate obesity with a BMI of 33, type 2 diabetes not on insulin, obstructive sleep apnea compliant with the CPAP. Currently, being treated for sinus infection and also taking otc meds for fever.  Denies taking decongestants.      Patient was admitted through emergency room on 12/26/2023 with complaints of nasal congestion, fever, chills, cough, headache, decreased appetite, poor oral intake,. He was found to have MICHELLE with creat of 8 , Na 121, mod anemia Hb 10.4.  He was also tested positive for non covid corona virus infection. Had elevated LFTs and low BP.  CTA/p showed duodenitis.  On 12/29/2023 patient developed atrial fibrillation with RVR.  He became hypotensive with IV Cardizem and later initiated on IV amiodarone.  Patient was seen as a consult for A-fib.  IV amiodarone was changed to oral therapy once rate was controlled.  Now, patient is here for follow-up visit.  Since last hospitalization, he  was admitted with UG bleed on 1/15/24 with acute blood loss anemia,  got 2 units of PRBC.  Found to have duodenitis on an EGD that was done during that admission. No further episodes of blood or black stools.     He is compliant with medications, as well as salt and fluid intake.  He does not take any over-the-counter arthritis medications.  He is back on anticoagulation therapy without any further bleeding complications.  He is also taking Protonix.    Patient was seen in office on 2/5/2024, since last visit, he went to the emergency room on 4/14/2024 with acute nausea vomiting diarrhea and was diagnosed with gastroenteritis.  He did not require

## 2024-05-17 ENCOUNTER — TELEPHONE (OUTPATIENT)
Dept: CARDIOLOGY | Age: 64
End: 2024-05-17

## 2024-05-21 RX ORDER — AMLODIPINE BESYLATE 5 MG/1
5 TABLET ORAL DAILY
Qty: 30 TABLET | Refills: 11 | Status: SHIPPED | OUTPATIENT
Start: 2024-05-21

## 2024-06-03 ENCOUNTER — HOSPITAL ENCOUNTER (OUTPATIENT)
Age: 64
Discharge: HOME | End: 2024-06-03
Payer: COMMERCIAL

## 2024-06-03 LAB
ALBUMIN SERPL-MCNC: 4.6 G/DL (ref 3.5–5.2)
ALP SERPL-CCNC: 44 U/L (ref 40–129)
ALT SERPL-CCNC: 21 U/L (ref 0–40)
ANION GAP SERPL CALCULATED.3IONS-SCNC: 12 MMOL/L (ref 7–16)
AST SERPL-CCNC: 47 U/L (ref 0–39)
BASOPHILS # BLD: 0.1 K/UL (ref 0–0.2)
BASOPHILS NFR BLD: 1 % (ref 0–2)
BILIRUB SERPL-MCNC: 0.5 MG/DL (ref 0–1.2)
BUN SERPL-MCNC: 23 MG/DL (ref 6–23)
CALCIUM SERPL-MCNC: 9.6 MG/DL (ref 8.6–10.2)
CHLORIDE SERPL-SCNC: 104 MMOL/L (ref 98–107)
CHOLEST SERPL-MCNC: 164 MG/DL
CO2 SERPL-SCNC: 21 MMOL/L (ref 22–29)
CREAT SERPL-MCNC: 1.2 MG/DL (ref 0.7–1.2)
CREAT UR-MCNC: 102.1 MG/DL (ref 40–278)
EOSINOPHIL # BLD: 0.7 K/UL (ref 0.05–0.5)
EOSINOPHILS RELATIVE PERCENT: 9 % (ref 0–6)
ERYTHROCYTE [DISTWIDTH] IN BLOOD BY AUTOMATED COUNT: 20.9 % (ref 11.5–15)
GFR, ESTIMATED: 69 ML/MIN/1.73M2
GLUCOSE SERPL-MCNC: 119 MG/DL (ref 74–99)
HBA1C MFR BLD: 6.3 % (ref 4–5.6)
HCT VFR BLD AUTO: 39.1 % (ref 37–54)
HDLC SERPL-MCNC: 48 MG/DL
HGB BLD-MCNC: 12.1 G/DL (ref 12.5–16.5)
IMM GRANULOCYTES # BLD AUTO: 0.09 K/UL (ref 0–0.58)
IMM GRANULOCYTES NFR BLD: 1 % (ref 0–5)
LDLC SERPL CALC-MCNC: 79 MG/DL
LYMPHOCYTES NFR BLD: 2.61 K/UL (ref 1.5–4)
LYMPHOCYTES RELATIVE PERCENT: 32 % (ref 20–42)
MCH RBC QN AUTO: 25.1 PG (ref 26–35)
MCHC RBC AUTO-ENTMCNC: 30.9 G/DL (ref 32–34.5)
MCV RBC AUTO: 81.1 FL (ref 80–99.9)
MICROALBUMIN UR-MCNC: <12 MG/L (ref 0–19)
MICROALBUMIN/CREAT UR-RTO: NORMAL MCG/MG CREAT (ref 0–30)
MONOCYTES NFR BLD: 0.71 K/UL (ref 0.1–0.95)
MONOCYTES NFR BLD: 9 % (ref 2–12)
NEUTROPHILS NFR BLD: 49 % (ref 43–80)
NEUTS SEG NFR BLD: 4.06 K/UL (ref 1.8–7.3)
PLATELET # BLD AUTO: 377 K/UL (ref 130–450)
PMV BLD AUTO: 8.6 FL (ref 7–12)
POTASSIUM SERPL-SCNC: 6.2 MMOL/L (ref 3.5–5)
PROT SERPL-MCNC: 7.8 G/DL (ref 6.4–8.3)
PSA SERPL-MCNC: 1.43 NG/ML (ref 0–4)
RBC # BLD AUTO: 4.82 M/UL (ref 3.8–5.8)
RBC # BLD: ABNORMAL 10*6/UL
RBC # BLD: ABNORMAL 10*6/UL
SODIUM SERPL-SCNC: 137 MMOL/L (ref 132–146)
TRIGL SERPL-MCNC: 186 MG/DL
VLDLC SERPL CALC-MCNC: 37 MG/DL
WBC OTHER # BLD: 8.3 K/UL (ref 4.5–11.5)

## 2024-06-03 PROCEDURE — G0103 PSA SCREENING: HCPCS

## 2024-06-03 PROCEDURE — 80061 LIPID PANEL: CPT

## 2024-06-03 PROCEDURE — 82570 ASSAY OF URINE CREATININE: CPT

## 2024-06-03 PROCEDURE — 85025 COMPLETE CBC W/AUTO DIFF WBC: CPT

## 2024-06-03 PROCEDURE — 80053 COMPREHEN METABOLIC PANEL: CPT

## 2024-06-03 PROCEDURE — 36415 COLL VENOUS BLD VENIPUNCTURE: CPT

## 2024-06-03 PROCEDURE — 83036 HEMOGLOBIN GLYCOSYLATED A1C: CPT

## 2024-06-03 PROCEDURE — 82043 UR ALBUMIN QUANTITATIVE: CPT

## 2024-06-18 ENCOUNTER — HOSPITAL ENCOUNTER (OUTPATIENT)
Age: 64
Discharge: HOME | End: 2024-06-18
Payer: COMMERCIAL

## 2024-06-18 LAB
25(OH)D3 SERPL-MCNC: 29.6 NG/ML (ref 30–100)
ALBUMIN SERPL-MCNC: 4.6 G/DL (ref 3.5–5.2)
ALP SERPL-CCNC: 46 U/L (ref 40–129)
ALT SERPL-CCNC: 18 U/L (ref 0–40)
ANION GAP SERPL CALCULATED.3IONS-SCNC: 16 MMOL/L (ref 7–16)
AST SERPL-CCNC: 22 U/L (ref 0–39)
BASOPHILS # BLD: 0.09 K/UL (ref 0–0.2)
BASOPHILS NFR BLD: 1 % (ref 0–2)
BILIRUB SERPL-MCNC: 0.5 MG/DL (ref 0–1.2)
BUN SERPL-MCNC: 22 MG/DL (ref 6–23)
CALCIUM SERPL-MCNC: 10.1 MG/DL (ref 8.6–10.2)
CHLORIDE SERPL-SCNC: 104 MMOL/L (ref 98–107)
CO2 SERPL-SCNC: 22 MMOL/L (ref 22–29)
CREAT SERPL-MCNC: 1.3 MG/DL (ref 0.7–1.2)
CREAT UR-MCNC: 45.4 MG/DL (ref 40–278)
EOSINOPHIL # BLD: 0.49 K/UL (ref 0.05–0.5)
EOSINOPHILS RELATIVE PERCENT: 7 % (ref 0–6)
ERYTHROCYTE [DISTWIDTH] IN BLOOD BY AUTOMATED COUNT: 22.3 % (ref 11.5–15)
GFR, ESTIMATED: 63 ML/MIN/1.73M2
GLUCOSE SERPL-MCNC: 102 MG/DL (ref 74–99)
HCT VFR BLD AUTO: 38.4 % (ref 37–54)
HGB BLD-MCNC: 12.3 G/DL (ref 12.5–16.5)
IMM GRANULOCYTES # BLD AUTO: 0.06 K/UL (ref 0–0.58)
IMM GRANULOCYTES NFR BLD: 1 % (ref 0–5)
LYMPHOCYTES NFR BLD: 2.55 K/UL (ref 1.5–4)
LYMPHOCYTES RELATIVE PERCENT: 38 % (ref 20–42)
MCH RBC QN AUTO: 26.4 PG (ref 26–35)
MCHC RBC AUTO-ENTMCNC: 32 G/DL (ref 32–34.5)
MCV RBC AUTO: 82.4 FL (ref 80–99.9)
MONOCYTES NFR BLD: 0.7 K/UL (ref 0.1–0.95)
MONOCYTES NFR BLD: 10 % (ref 2–12)
NEUTROPHILS NFR BLD: 42 % (ref 43–80)
NEUTS SEG NFR BLD: 2.81 K/UL (ref 1.8–7.3)
PHOSPHATE SERPL-MCNC: 3.7 MG/DL (ref 2.5–4.5)
PLATELET # BLD AUTO: 367 K/UL (ref 130–450)
PMV BLD AUTO: 8.5 FL (ref 7–12)
POTASSIUM SERPL-SCNC: 4.1 MMOL/L (ref 3.5–5)
PROT SERPL-MCNC: 7.4 G/DL (ref 6.4–8.3)
PTH-INTACT SERPL-MCNC: 15.6 PG/ML (ref 15–65)
RBC # BLD AUTO: 4.66 M/UL (ref 3.8–5.8)
RBC # BLD: ABNORMAL 10*6/UL
RBC # BLD: ABNORMAL 10*6/UL
SODIUM SERPL-SCNC: 142 MMOL/L (ref 132–146)
TOTAL PROTEIN, URINE: <4 MG/DL (ref 0–12)
WBC OTHER # BLD: 6.7 K/UL (ref 4.5–11.5)

## 2024-06-18 PROCEDURE — 82306 VITAMIN D 25 HYDROXY: CPT

## 2024-06-18 PROCEDURE — 83970 ASSAY OF PARATHORMONE: CPT

## 2024-06-18 PROCEDURE — 84100 ASSAY OF PHOSPHORUS: CPT

## 2024-06-18 PROCEDURE — 36415 COLL VENOUS BLD VENIPUNCTURE: CPT

## 2024-06-18 PROCEDURE — 84156 ASSAY OF PROTEIN URINE: CPT

## 2024-06-18 PROCEDURE — 85025 COMPLETE CBC W/AUTO DIFF WBC: CPT

## 2024-06-18 PROCEDURE — 82570 ASSAY OF URINE CREATININE: CPT

## 2024-06-18 PROCEDURE — 80053 COMPREHEN METABOLIC PANEL: CPT

## 2024-08-08 ENCOUNTER — TELEPHONE (OUTPATIENT)
Dept: CARDIOLOGY | Age: 64
End: 2024-08-08

## 2024-08-08 NOTE — TELEPHONE ENCOUNTER
Left message on voice mail to remind patient of nuclear stress test appointment on 8/12/24 at 0700. Instructions for test,and medication hold information left on voice mail. Asked patient to call with any questions or if unable to keep appointment.

## 2024-08-12 ENCOUNTER — HOSPITAL ENCOUNTER (OUTPATIENT)
Dept: CARDIOLOGY | Age: 64
Discharge: HOME | End: 2024-08-14
Attending: INTERNAL MEDICINE
Payer: COMMERCIAL

## 2024-08-12 ENCOUNTER — CLINICAL DOCUMENTATION (OUTPATIENT)
Dept: CARDIOLOGY | Age: 64
End: 2024-08-12

## 2024-08-12 ENCOUNTER — TELEPHONE (OUTPATIENT)
Dept: CARDIOLOGY CLINIC | Age: 64
End: 2024-08-12

## 2024-08-12 VITALS
HEIGHT: 72 IN | RESPIRATION RATE: 16 BRPM | DIASTOLIC BLOOD PRESSURE: 82 MMHG | BODY MASS INDEX: 33.32 KG/M2 | HEART RATE: 64 BPM | SYSTOLIC BLOOD PRESSURE: 138 MMHG | WEIGHT: 246 LBS

## 2024-08-12 DIAGNOSIS — R94.31 ABNORMAL ELECTROCARDIOGRAPHY: ICD-10-CM

## 2024-08-12 PROBLEM — Z12.11 SCREEN FOR COLON CANCER: Status: ACTIVE | Noted: 2024-01-31

## 2024-08-12 LAB
ECHO BSA: 2.38 M2
NUC STRESS EJECTION FRACTION: 53 %
STRESS BASELINE DIAS BP: 82 MMHG
STRESS BASELINE HR: 71 BPM
STRESS BASELINE SYS BP: 138 MMHG
STRESS ESTIMATED WORKLOAD: 10 METS
STRESS EXERCISE DUR MIN: 7 MIN
STRESS EXERCISE DUR SEC: 0 SEC
STRESS O2 SAT PEAK: 95 %
STRESS PEAK DIAS BP: 90 MMHG
STRESS PEAK SYS BP: 206 MMHG
STRESS PERCENT HR ACHIEVED: 95 %
STRESS POST PEAK HR: 148 BPM
STRESS RATE PRESSURE PRODUCT: NORMAL BPM*MMHG
STRESS TARGET HR: 156 BPM

## 2024-08-12 PROCEDURE — 78452 HT MUSCLE IMAGE SPECT MULT: CPT | Performed by: INTERNAL MEDICINE

## 2024-08-12 PROCEDURE — A9500 TC99M SESTAMIBI: HCPCS | Performed by: INTERNAL MEDICINE

## 2024-08-12 PROCEDURE — 78452 HT MUSCLE IMAGE SPECT MULT: CPT

## 2024-08-12 PROCEDURE — 93018 CV STRESS TEST I&R ONLY: CPT | Performed by: INTERNAL MEDICINE

## 2024-08-12 PROCEDURE — 93016 CV STRESS TEST SUPVJ ONLY: CPT | Performed by: INTERNAL MEDICINE

## 2024-08-12 PROCEDURE — 2580000003 HC RX 258: Performed by: INTERNAL MEDICINE

## 2024-08-12 PROCEDURE — 3430000000 HC RX DIAGNOSTIC RADIOPHARMACEUTICAL: Performed by: INTERNAL MEDICINE

## 2024-08-12 PROCEDURE — 2500000003 HC RX 250 WO HCPCS: Performed by: INTERNAL MEDICINE

## 2024-08-12 RX ORDER — TETRAKIS(2-METHOXYISOBUTYLISOCYANIDE)COPPER(I) TETRAFLUOROBORATE 1 MG/ML
34.5 INJECTION, POWDER, LYOPHILIZED, FOR SOLUTION INTRAVENOUS
Status: COMPLETED | OUTPATIENT
Start: 2024-08-12 | End: 2024-08-12

## 2024-08-12 RX ORDER — SODIUM CHLORIDE 0.9 % (FLUSH) 0.9 %
10 SYRINGE (ML) INJECTION PRN
Status: DISCONTINUED | OUTPATIENT
Start: 2024-08-12 | End: 2024-08-15 | Stop reason: HOSPADM

## 2024-08-12 RX ORDER — METOPROLOL TARTRATE 1 MG/ML
5 INJECTION, SOLUTION INTRAVENOUS ONCE
Status: COMPLETED | OUTPATIENT
Start: 2024-08-12 | End: 2024-08-12

## 2024-08-12 RX ORDER — M-VIT,TX,IRON,MINS/CALC/FOLIC 27MG-0.4MG
1 TABLET ORAL DAILY
COMMUNITY

## 2024-08-12 RX ORDER — TETRAKIS(2-METHOXYISOBUTYLISOCYANIDE)COPPER(I) TETRAFLUOROBORATE 1 MG/ML
10 INJECTION, POWDER, LYOPHILIZED, FOR SOLUTION INTRAVENOUS
Status: COMPLETED | OUTPATIENT
Start: 2024-08-12 | End: 2024-08-12

## 2024-08-12 RX ADMIN — SODIUM CHLORIDE, PRESERVATIVE FREE 10 ML: 5 INJECTION INTRAVENOUS at 07:08

## 2024-08-12 RX ADMIN — Medication 10 MILLICURIE: at 07:08

## 2024-08-12 RX ADMIN — METOPROLOL TARTRATE 5 MG: 1 INJECTION, SOLUTION INTRAVENOUS at 08:53

## 2024-08-12 RX ADMIN — Medication 34.5 MILLICURIE: at 08:24

## 2024-08-12 RX ADMIN — SODIUM CHLORIDE, PRESERVATIVE FREE 10 ML: 5 INJECTION INTRAVENOUS at 08:55

## 2024-08-12 RX ADMIN — SODIUM CHLORIDE, PRESERVATIVE FREE 10 ML: 5 INJECTION INTRAVENOUS at 08:24

## 2024-08-12 NOTE — PROGRESS NOTES
Patient developed afib in recovery of nuclear stress test this morning. Patient stopped eliquis and metoprolol on his own 2 months ago due to nausea, vomiting, and diarrhea.Given Metoprolol IV 5mg per Dr. Estrada order and patient converted to sinus rhythm. Maintained sinus rhythm during stress nuclear scan. Dr. Estrada said okay for patient to leave. [facility] notified of above and messaged Dr. Ram. Patient instructed that office will call him with instructions.

## 2024-08-13 ENCOUNTER — TELEPHONE (OUTPATIENT)
Dept: CARDIOLOGY CLINIC | Age: 64
End: 2024-08-13

## 2024-08-13 RX ORDER — DILTIAZEM HYDROCHLORIDE 120 MG/1
120 CAPSULE, COATED, EXTENDED RELEASE ORAL DAILY
COMMUNITY
End: 2024-08-13 | Stop reason: SDUPTHER

## 2024-08-13 RX ORDER — DILTIAZEM HYDROCHLORIDE 120 MG/1
120 CAPSULE, COATED, EXTENDED RELEASE ORAL DAILY
Qty: 30 CAPSULE | Refills: 11 | Status: SHIPPED | OUTPATIENT
Start: 2024-08-13

## 2024-08-13 NOTE — TELEPHONE ENCOUNTER
Aguilar Ram MD      AM  Note     New prescription was sent for Cardizem.  Please ask him to discontinue amlodipine.  There is no need to take both Cardizem and amlodipine.        Patient's wife (Pina) notified of Dr. Ram's recommendation.  Med list already amended by Dr. Ram.

## 2024-08-13 NOTE — TELEPHONE ENCOUNTER
----- Message from Dr. Aguilar Ram MD sent at 8/13/2024  2:15 PM EDT -----  Stress test was normal.

## 2024-08-13 NOTE — TELEPHONE ENCOUNTER
New prescription was sent for Cardizem.  Please ask him to discontinue amlodipine.  There is no need to take both Cardizem and amlodipine.

## 2024-08-20 NOTE — PROGRESS NOTES
[facility] PRE-ADMISSION TESTING   ENDOSCOPY AND COLONOSCOPY INSTRUCTIONS  PAT- Phone Number: [phone]    ENDOSCOPY AND COLONOSCOPY INSTRUCTIONS:     [x] Bowel Prep instructions reviewed - any questions regarding your prep, please contact surgeon's office.  [x] Colonoscopy: 1-2 days prior: No solid foods. Clear liquids only - nothing red or purple in color.  [x] Antibacterial Soap Shower Night before AND the morning of procedure.  [x] No solid food after midnight. You may have SIPS of clear liquids up until 2 hours before your arrival time to the hospital.   [x] Do not wear lotions, powders, deodorant.   [x] No tobacco products, illegal drugs, or alcohol within 24 hours of your surgery.  [x] Jewelry or valuables should not be brought to the hospital. All body and/or tongue piercing's must be removed prior to arriving to hospital. No contact lens or hair pins.   [x] Arrange transportation with a responsible adult  to and from the hospital. If you do not have a responsible adult  to transport you, you will need to make arrangements with a medical transportation company. Arrange for someone to be with you for the remainder of the day and for 24 hours after your procedure due to having had anesthesia.    -Who will be your  for transportation? Wife  -Who will be staying with you for 24 hrs after your procedure? Wife  [x] Bring insurance card and photo ID.  [x] Bring copy of living will or healthcare power of  papers to be placed in your electronic record.    PARKING INSTRUCTIONS:     [x] ARRIVAL DATE & TIME: 8/27  @  0615    [x] Times are subject to change. We will contact you the business day before surgery if that were to occur.  [x] Enter into the [facility] Entrance. Two people may accompany you. Masks are not required.  [x] Parking Lot \"I\" is where you will park. It is located on the corner of [address] and [address]. The entrance is on

## 2024-08-27 ENCOUNTER — ANESTHESIA EVENT (OUTPATIENT)
Dept: ENDOSCOPY | Age: 64
End: 2024-08-27
Payer: COMMERCIAL

## 2024-08-27 ENCOUNTER — ANESTHESIA (OUTPATIENT)
Dept: ENDOSCOPY | Age: 64
End: 2024-08-27
Payer: COMMERCIAL

## 2024-08-27 ENCOUNTER — HOSPITAL ENCOUNTER (OUTPATIENT)
Age: 64
Setting detail: OUTPATIENT SURGERY
Discharge: HOME | End: 2024-08-27
Attending: STUDENT IN AN ORGANIZED HEALTH CARE EDUCATION/TRAINING PROGRAM | Admitting: STUDENT IN AN ORGANIZED HEALTH CARE EDUCATION/TRAINING PROGRAM
Payer: COMMERCIAL

## 2024-08-27 VITALS
TEMPERATURE: 98 F | DIASTOLIC BLOOD PRESSURE: 111 MMHG | WEIGHT: 246 LBS | SYSTOLIC BLOOD PRESSURE: 158 MMHG | OXYGEN SATURATION: 97 % | BODY MASS INDEX: 33.32 KG/M2 | HEIGHT: 72 IN | HEART RATE: 66 BPM | RESPIRATION RATE: 16 BRPM

## 2024-08-27 DIAGNOSIS — Z01.812 PRE-OPERATIVE LABORATORY EXAMINATION: Primary | ICD-10-CM

## 2024-08-27 DIAGNOSIS — Z12.11 SCREEN FOR COLON CANCER: ICD-10-CM

## 2024-08-27 DIAGNOSIS — K25.9 GASTRIC ULCER: ICD-10-CM

## 2024-08-27 PROBLEM — Z80.0 FAMILY HISTORY OF COLON CANCER: Status: ACTIVE | Noted: 2024-08-27

## 2024-08-27 LAB — GLUCOSE BLD-MCNC: 95 MG/DL (ref 74–99)

## 2024-08-27 PROCEDURE — 3700000000 HC ANESTHESIA ATTENDED CARE: Performed by: STUDENT IN AN ORGANIZED HEALTH CARE EDUCATION/TRAINING PROGRAM

## 2024-08-27 PROCEDURE — 7100000011 HC PHASE II RECOVERY - ADDTL 15 MIN: Performed by: STUDENT IN AN ORGANIZED HEALTH CARE EDUCATION/TRAINING PROGRAM

## 2024-08-27 PROCEDURE — 3609012400 HC EGD TRANSORAL BIOPSY SINGLE/MULTIPLE: Performed by: STUDENT IN AN ORGANIZED HEALTH CARE EDUCATION/TRAINING PROGRAM

## 2024-08-27 PROCEDURE — 6360000002 HC RX W HCPCS

## 2024-08-27 PROCEDURE — 2580000003 HC RX 258

## 2024-08-27 PROCEDURE — 82962 GLUCOSE BLOOD TEST: CPT

## 2024-08-27 PROCEDURE — 3700000001 HC ADD 15 MINUTES (ANESTHESIA): Performed by: STUDENT IN AN ORGANIZED HEALTH CARE EDUCATION/TRAINING PROGRAM

## 2024-08-27 PROCEDURE — 2709999900 HC NON-CHARGEABLE SUPPLY: Performed by: STUDENT IN AN ORGANIZED HEALTH CARE EDUCATION/TRAINING PROGRAM

## 2024-08-27 PROCEDURE — 3609010600 HC COLONOSCOPY POLYPECTOMY SNARE/COLD BIOPSY: Performed by: STUDENT IN AN ORGANIZED HEALTH CARE EDUCATION/TRAINING PROGRAM

## 2024-08-27 PROCEDURE — 88305 TISSUE EXAM BY PATHOLOGIST: CPT

## 2024-08-27 PROCEDURE — 7100000010 HC PHASE II RECOVERY - FIRST 15 MIN: Performed by: STUDENT IN AN ORGANIZED HEALTH CARE EDUCATION/TRAINING PROGRAM

## 2024-08-27 RX ORDER — PROPOFOL 10 MG/ML
INJECTION, EMULSION INTRAVENOUS PRN
Status: DISCONTINUED | OUTPATIENT
Start: 2024-08-27 | End: 2024-08-27 | Stop reason: SDUPTHER

## 2024-08-27 RX ORDER — ONDANSETRON 2 MG/ML
4 INJECTION INTRAMUSCULAR; INTRAVENOUS EVERY 6 HOURS PRN
Status: CANCELLED | OUTPATIENT
Start: 2024-08-27

## 2024-08-27 RX ORDER — SODIUM CHLORIDE 9 MG/ML
INJECTION, SOLUTION INTRAVENOUS CONTINUOUS PRN
Status: DISCONTINUED | OUTPATIENT
Start: 2024-08-27 | End: 2024-08-27 | Stop reason: SDUPTHER

## 2024-08-27 RX ORDER — SODIUM CHLORIDE 0.9 % (FLUSH) 0.9 %
5-40 SYRINGE (ML) INJECTION EVERY 12 HOURS SCHEDULED
Status: CANCELLED | OUTPATIENT
Start: 2024-08-27

## 2024-08-27 RX ORDER — ONDANSETRON 4 MG/1
4 TABLET, ORALLY DISINTEGRATING ORAL EVERY 8 HOURS PRN
Status: CANCELLED | OUTPATIENT
Start: 2024-08-27

## 2024-08-27 RX ORDER — SODIUM CHLORIDE 0.9 % (FLUSH) 0.9 %
5-40 SYRINGE (ML) INJECTION PRN
Status: CANCELLED | OUTPATIENT
Start: 2024-08-27

## 2024-08-27 RX ORDER — SODIUM CHLORIDE 9 MG/ML
INJECTION, SOLUTION INTRAVENOUS PRN
Status: CANCELLED | OUTPATIENT
Start: 2024-08-27

## 2024-08-27 RX ORDER — LIDOCAINE HYDROCHLORIDE 20 MG/ML
INJECTION, SOLUTION INTRAVENOUS PRN
Status: DISCONTINUED | OUTPATIENT
Start: 2024-08-27 | End: 2024-08-27 | Stop reason: SDUPTHER

## 2024-08-27 RX ADMIN — SODIUM CHLORIDE: 9 INJECTION, SOLUTION INTRAVENOUS at 07:53

## 2024-08-27 RX ADMIN — PROPOFOL 30 MG: 10 INJECTION, EMULSION INTRAVENOUS at 08:04

## 2024-08-27 RX ADMIN — PROPOFOL 150 MCG/KG/MIN: 10 INJECTION, EMULSION INTRAVENOUS at 08:01

## 2024-08-27 RX ADMIN — LIDOCAINE HYDROCHLORIDE 60 MG: 20 INJECTION, SOLUTION INTRAVENOUS at 08:01

## 2024-08-27 RX ADMIN — PROPOFOL 40 MG: 10 INJECTION, EMULSION INTRAVENOUS at 08:00

## 2024-08-27 ASSESSMENT — PAIN - FUNCTIONAL ASSESSMENT: PAIN_FUNCTIONAL_ASSESSMENT: NONE - DENIES PAIN

## 2024-08-27 NOTE — ANESTHESIA PRE PROCEDURE
(246 lb)   Height: 1.803 m (5' 11\") 1.829 m (6')                                              BP Readings from Last 3 Encounters:   08/27/24 (!) 160/76   08/12/24 138/82   05/16/24 (!) 142/78       NPO Status: Time of last liquid consumption: 2245                        Time of last solid consumption: 1830                        Date of last liquid consumption: 08/26/24                        Date of last solid food consumption: 08/25/24    BMI:   Wt Readings from Last 3 Encounters:   08/27/24 111.6 kg (246 lb)   08/12/24 111.6 kg (246 lb)   05/16/24 112 kg (246 lb 14.4 oz)     Body mass index is 33.36 kg/m².    CBC:   Lab Results   Component Value Date/Time    WBC 6.7 06/18/2024 06:27 AM    RBC 4.66 06/18/2024 06:27 AM    HGB 12.3 06/18/2024 06:27 AM    HCT 38.4 06/18/2024 06:27 AM    MCV 82.4 06/18/2024 06:27 AM    RDW 22.3 06/18/2024 06:27 AM     06/18/2024 06:27 AM       CMP:   Lab Results   Component Value Date/Time     06/18/2024 06:27 AM    K 4.1 06/18/2024 06:27 AM     06/18/2024 06:27 AM    CO2 22 06/18/2024 06:27 AM    BUN 22 06/18/2024 06:27 AM    CREATININE 1.3 06/18/2024 06:27 AM    GFRAA >60 10/07/2022 07:24 AM    LABGLOM 63 06/18/2024 06:27 AM    LABGLOM 53 04/14/2024 11:01 AM    GLUCOSE 102 06/18/2024 06:27 AM    GLUCOSE 123 05/26/2012 07:55 AM    CALCIUM 10.1 06/18/2024 06:27 AM    BILITOT 0.5 06/18/2024 06:27 AM    ALKPHOS 46 06/18/2024 06:27 AM    AST 22 06/18/2024 06:27 AM    ALT 18 06/18/2024 06:27 AM       POC Tests:   No results for input(s): \"POCGLU\", \"POCNA\", \"POCK\", \"POCCL\", \"POCBUN\", \"POCHEMO\", \"POCHCT\" in the last 72 hours.      Coags: No results found for: \"PROTIME\", \"INR\", \"APTT\"    HCG (If Applicable): No results found for: \"PREGTESTUR\", \"PREGSERUM\", \"HCG\", \"HCGQUANT\"     ABGs: No results found for: \"PHART\", \"PO2ART\", \"UCM8RGR\", \"EYG8KQZ\", \"BEART\", \"C8DDOAJS\"     Type & Screen (If Applicable):  No results found for: \"LABABO\"    Drug/Infectious Status (If  Applicable):  Lab Results   Component Value Date/Time    HEPCAB NONREACTIVE 2023 11:41 AM       COVID-19 Screening (If Applicable):   Lab Results   Component Value Date/Time    COVID19 Detected 2024 03:54 PM    COVID19 Not Detected 01/15/2024 08:56 AM    COVID19 Not Detected 2023 08:11 PM     EK24  Component  Ref Range & Units 24 1044 1/15/24 0855 23 1345 23 1654 22 0827 14 1529   Ventricular Rate  BPM 82 99 91 84 77    Atrial Rate  BPM 82 99 344 84 77    P-R Interval  ms 226 212  220 216 ECG IN INTERVAL: 206 ms   QRS Duration  ms 108 106 114 124 108    Q-T Interval  ms 400 394 404 388 404 ECG QT INTERVAL: 384 ms   QTc Calculation (Bazett)  ms 467 505 496 458 457    P Axis  degrees 66 67  60 3 ECG P AXIS: 29 deg R   R Axis  degrees 73 19 41 39     T Axis  degrees 87 50 106 103 104 ECG T AXIS: 108 deg R   Resulting Agency [facility]              Narrative & Impression    Sinus rhythm with 1st degree AV block  Cannot rule out Anterior infarct (cited on or before 26-DEC-2023)  Abnormal ECG  When compared with ECG of 15-ROBLES-2024 08:55,  Significant changes have occurred  Confirmed by Demetri Arzate ([id number]) on 4/15/2024 10:25:31 AM        ECHO: 23    Left Ventricle: Normal left ventricular systolic function with a visually estimated EF of 60 - 65%. Atrail fibrillation may affect the evaluation of LV systolic function.  Left ventricle size is normal. Mild posterior thickening. Moderately increased ventricular mass. Findings consistent with moderate concentric hypertrophy. Normal wall motion.    Right Ventricle: Right ventricle size is normal. Normal systolic function. TAPSE is 2.1 cm.    Aortic Valve: Mild regurgitation. No stenosis.    Mitral Valve: Mild regurgitation.    Tricuspid Valve: Mild regurgitation. Normal RVSP. The estimated RVSP is 27 mmHg.    Right Atrium: Right atrium is mildly dilated.    Pericardium: No

## 2024-08-27 NOTE — DISCHARGE INSTRUCTIONS
Recommendations:  -The patient will be observed post procedure until all discharge criteria are met.    -Patient has a contact number available for emergencies.  The signs and symptoms of potential delayed complications were discussed with the patient.    -Return to normal activities tomorrow.    -Written discharge instructions were provided to the patient.    -Resume anticoagulation tomorrow.  -Await pathology results.  -Timeframe until next colonoscopy will be three years.  -Clinic appointment to be scheduled.

## 2024-08-27 NOTE — H&P
[facility]   Gastroenterology, Hepatology, &  Advanced Endoscopy    Consult       ASSESSMENT AND PLAN:    64y/M presents for endoscopic evaluation of anemia.    PLAN:  SBE/Colonoscopy        HISTORY OF PRESENT ILLNESS:      Mr. Cain Bennett is a 64y/M w/ history of anemia which required transfusion during admission earlier this year. EGD was performed which showed duodenitis. His Hgb has improved to the 9-9.5 range. He presents for continued endoscopic evaluation.     Past Medical History:        Diagnosis Date    Atrial fibrillation (HCC)     Chronic sinusitis     disease - for surgical encounter 7/18/14    Diabetes mellitus (HCC)     dx 10/13; type II    Environmental allergies     Heart murmur     Hyperlipidemia     Hypertension     stable per patient    Sleep apnea     uses c pap     Past Surgical History:        Procedure Laterality Date    COLONOSCOPY  10/13/11    COLONOSCOPY  11/09/2016    DR BARRAZA    ECHO COMPL W DOP COLOR FLOW  2/27/2013         NASAL SINUS SURGERY      times 4    SINUS ENDOSCOPY Bilateral 11/4/2022    LEFT ENDOSCOPIC MEDIAL ANTROSTOMY RIGHT MAXILLARY SINUSOTOMY WITH REMOVAL OF SOFT TISSUE BILATERALLY performed by Chele Amezcua Jr., MD at [facility] OR    SINUS SURGERY Bilateral 7/18/2014    TONSILLECTOMY      UPPER GASTROINTESTINAL ENDOSCOPY N/A 1/16/2024    EGD BIOPSY performed by Herbert Branch MD at [facility] ENDOSCOPY     Social History:    TOBACCO:   reports that he has never smoked. He has never used smokeless tobacco.  ETOH:   reports no history of alcohol use.  DRUGS:   reports no history of drug use.  Family History:       Problem Relation Age of Onset    Cancer Mother         hodgkins    Other Father         blood clot    Cancer Brother         hodgkins       No current facility-administered medications for this encounter.    Current Outpatient Medications:     dilTIAZem (CARDIZEM CD) 120 MG extended release capsule, Take 1 capsule by mouth daily CHANGING FROM METOPROLOL (Patient

## 2024-08-28 NOTE — ANESTHESIA POSTPROCEDURE EVALUATION
Department of Anesthesiology  Postprocedure Note    Patient: Cain Bennett  MRN: [id number]  YOB: 1960  Date of evaluation: 8/27/2024    Procedure Summary       Date: 08/27/24 Room / Location: [place] [address] / [facility]    Anesthesia Start: 0753 Anesthesia Stop: 0851    Procedures:       ESOPHAGOGASTRODUODENOSCOPY BIOPSY      COLONOSCOPY POLYPECTOMY SNARE/BIOPSY Diagnosis:       Gastric ulcer      Screen for colon cancer      (Gastric ulcer [K25.9])      (Screen for colon cancer [Z12.11])    Surgeons: Herbert Branch MD Responsible Provider: Yuval Engle DO    Anesthesia Type: MAC ASA Status: 3            Anesthesia Type: MAC    Darrel Phase I: Darrel Score: 10    Darrel Phase II:      Anesthesia Post Evaluation    Patient location during evaluation: PACU  Patient participation: complete - patient participated  Level of consciousness: awake and alert  Pain score: 1  Airway patency: patent  Nausea & Vomiting: no nausea and no vomiting  Cardiovascular status: hemodynamically stable  Respiratory status: acceptable  Hydration status: euvolemic  Pain management: adequate    No notable events documented.

## 2024-09-03 LAB — SURGICAL PATHOLOGY REPORT: NORMAL

## 2024-09-11 ENCOUNTER — OFFICE VISIT (OUTPATIENT)
Age: 64
End: 2024-09-11
Payer: COMMERCIAL

## 2024-09-11 VITALS
TEMPERATURE: 97.3 F | BODY MASS INDEX: 34.72 KG/M2 | OXYGEN SATURATION: 95 % | SYSTOLIC BLOOD PRESSURE: 147 MMHG | WEIGHT: 256 LBS | DIASTOLIC BLOOD PRESSURE: 68 MMHG | HEART RATE: 86 BPM

## 2024-09-11 DIAGNOSIS — D64.9 ANEMIA, UNSPECIFIED TYPE: Primary | ICD-10-CM

## 2024-09-11 PROBLEM — Z12.11 SCREEN FOR COLON CANCER: Status: RESOLVED | Noted: 2024-01-31 | Resolved: 2024-09-11

## 2024-09-11 PROCEDURE — 3078F DIAST BP <80 MM HG: CPT | Performed by: NURSE PRACTITIONER

## 2024-09-11 PROCEDURE — 3077F SYST BP >= 140 MM HG: CPT | Performed by: NURSE PRACTITIONER

## 2024-09-11 PROCEDURE — 99212 OFFICE O/P EST SF 10 MIN: CPT | Performed by: NURSE PRACTITIONER

## 2024-09-11 PROCEDURE — 1123F ACP DISCUSS/DSCN MKR DOCD: CPT | Performed by: NURSE PRACTITIONER

## 2024-09-14 ENCOUNTER — HOSPITAL ENCOUNTER (OUTPATIENT)
Age: 64
Discharge: HOME | End: 2024-09-14
Payer: COMMERCIAL

## 2024-09-14 DIAGNOSIS — D64.9 ANEMIA, UNSPECIFIED TYPE: ICD-10-CM

## 2024-09-14 LAB
BASOPHILS # BLD: 0.06 K/UL (ref 0–0.2)
BASOPHILS NFR BLD: 1 % (ref 0–2)
EOSINOPHIL # BLD: 0.32 K/UL (ref 0.05–0.5)
EOSINOPHILS RELATIVE PERCENT: 4 % (ref 0–6)
ERYTHROCYTE [DISTWIDTH] IN BLOOD BY AUTOMATED COUNT: 13.8 % (ref 11.5–15)
HCT VFR BLD AUTO: 41.7 % (ref 37–54)
HGB BLD-MCNC: 13.9 G/DL (ref 12.5–16.5)
IMM GRANULOCYTES # BLD AUTO: 0.04 K/UL (ref 0–0.58)
IMM GRANULOCYTES NFR BLD: 1 % (ref 0–5)
LYMPHOCYTES NFR BLD: 2.14 K/UL (ref 1.5–4)
LYMPHOCYTES RELATIVE PERCENT: 26 % (ref 20–42)
MCH RBC QN AUTO: 29.9 PG (ref 26–35)
MCHC RBC AUTO-ENTMCNC: 33.3 G/DL (ref 32–34.5)
MCV RBC AUTO: 89.7 FL (ref 80–99.9)
MONOCYTES NFR BLD: 0.67 K/UL (ref 0.1–0.95)
MONOCYTES NFR BLD: 8 % (ref 2–12)
NEUTROPHILS NFR BLD: 61 % (ref 43–80)
NEUTS SEG NFR BLD: 4.97 K/UL (ref 1.8–7.3)
PLATELET # BLD AUTO: 300 K/UL (ref 130–450)
PMV BLD AUTO: 8.5 FL (ref 7–12)
RBC # BLD AUTO: 4.65 M/UL (ref 3.8–5.8)
WBC OTHER # BLD: 8.2 K/UL (ref 4.5–11.5)

## 2024-09-14 PROCEDURE — 36415 COLL VENOUS BLD VENIPUNCTURE: CPT

## 2024-09-14 PROCEDURE — 85025 COMPLETE CBC W/AUTO DIFF WBC: CPT

## 2024-10-17 NOTE — TELEPHONE ENCOUNTER
Patient's wife (Pina) notified of Dr. Ram's recommendations. F/U scheduled for 2/5/24 at 9:00 a.m.   f/u GALLITO with panic attacks and MDD, pt discussed with treatment team, JEANNINE PFEIFFER, accepted all scheduled psychotropic medications. Pt was sleeping in bed on approach, states she feels dizzy though ortho BPs WNL. Encouraged to increase fluid intake and agreeable to decrease propranolol to 10 mg PO BID. Pt educated to get up from sitting and lying positions slowly to avoid risk for falls. Pt states she feels tremors are slightly better with the propranolol. Pt encouraged to attend groups and readily left her bed  to go to the first group of the day. Pt appears brighter and easier to redirect from catastrophic thinking. Remains anxious, depressed, and somatically preoccupied. Denies SI, HI, AVH.

## 2024-11-25 ENCOUNTER — OFFICE VISIT (OUTPATIENT)
Dept: CARDIOLOGY CLINIC | Age: 64
End: 2024-11-25
Payer: COMMERCIAL

## 2024-11-25 VITALS
WEIGHT: 253 LBS | DIASTOLIC BLOOD PRESSURE: 90 MMHG | BODY MASS INDEX: 35.42 KG/M2 | RESPIRATION RATE: 18 BRPM | OXYGEN SATURATION: 96 % | TEMPERATURE: 97.3 F | HEIGHT: 71 IN | SYSTOLIC BLOOD PRESSURE: 150 MMHG | HEART RATE: 84 BPM

## 2024-11-25 DIAGNOSIS — R94.31 ABNORMAL ELECTROCARDIOGRAPHY: Primary | ICD-10-CM

## 2024-11-25 PROCEDURE — 3080F DIAST BP >= 90 MM HG: CPT | Performed by: INTERNAL MEDICINE

## 2024-11-25 PROCEDURE — 3077F SYST BP >= 140 MM HG: CPT | Performed by: INTERNAL MEDICINE

## 2024-11-25 PROCEDURE — 93000 ELECTROCARDIOGRAM COMPLETE: CPT | Performed by: INTERNAL MEDICINE

## 2024-11-25 PROCEDURE — 1123F ACP DISCUSS/DSCN MKR DOCD: CPT | Performed by: INTERNAL MEDICINE

## 2024-11-25 PROCEDURE — 99214 OFFICE O/P EST MOD 30 MIN: CPT | Performed by: INTERNAL MEDICINE

## 2024-11-25 NOTE — PROGRESS NOTES
on metformin.  MICHELLE secondary to NSAID use and decreased oral intake, improving, creat 8.1>>5.8>>2.8>>1.5>>1.3  Non coronavirus upper respiratory infection with secondary bacterial sinusitis resolved.  Mild hypokalemia, K 4.2>>3.3 Mild anemia, Hb 10.1>>9.5  JINA on C-pap, compliant with C-pap use    Plan:   Exercise stress test results were reviewed and discussed patient no evidence of ischemia.  He exercised for 7 minutes without any chest pain or dyspnea.  Advised to call me if he develops any further episodes of A-fib then we can consider adding flecainide as a pill in a pocket for rhythm control  Kardia device results were reviewed and no evidence of A-fib over the last 6 months.  Continue Cardizem 120 mg p.o. daily  Continue rosuvastatin 10 mg p.o. daily and fenofibrate 160 mg p.o. daily.  Advised to monitor blood pressure heart rate daily and call me in 1 week.  Continue XARELTO 20  mg po DAILY  for anticoagulation   Continue metoprolol 25 mg po twice a day for rate control  Continue rosuvastatin 10 mg po daily and fenofibrate   Advised to avoid otc NSAIDS   Advised to monitor blood pressure heart rate daily and call me in 1 week.  Blood pressure remains elevated and we can consider either increasing Cardizem to 180 mg or we can add a different class of antihypertensive medication  Continue rest of the medications as per Dr. Limon  Follow up with me in 6 months.      The patient's current medication list, allergies, problem list and results of all previously ordered testing were reviewed at today's visit.  Aguilar Ram MD  [facility]

## 2024-11-25 NOTE — PATIENT INSTRUCTIONS
Exercise stress test results were reviewed and discussed patient no evidence of ischemia.  He exercised for 7 minutes without any chest pain or dyspnea.  Advised to call me if he develops any further episodes of A-fib then we can consider adding flecainide as a pill in a pocket for rhythm control  Kardia device results were reviewed and no evidence of A-fib over the last 6 months.  Continue Cardizem 120 mg p.o. daily  Continue rosuvastatin 10 mg p.o. daily and fenofibrate 160 mg p.o. daily.  Advised to monitor blood pressure heart rate daily and call me in 1 week.  Continue XARELTO 20  mg po DAILY  for anticoagulation   Continue metoprolol 25 mg po twice a day for rate control  Continue rosuvastatin 10 mg po daily and fenofibrate   Advised to avoid otc NSAIDS   Advised to monitor blood pressure heart rate daily and call me in 1 week.  Blood pressure remains elevated and we can consider either increasing Cardizem to 180 mg or we can add a different class of antihypertensive medication  Continue rest of the medications as per Dr. Limon  Follow up with me in 6 months.

## 2024-12-02 ENCOUNTER — TELEPHONE (OUTPATIENT)
Dept: CARDIOLOGY CLINIC | Age: 64
End: 2024-12-02

## 2024-12-02 DIAGNOSIS — I10 HYPERTENSION, UNSPECIFIED TYPE: Primary | ICD-10-CM

## 2024-12-02 RX ORDER — LOSARTAN POTASSIUM 50 MG/1
50 TABLET ORAL DAILY
Qty: 30 TABLET | Refills: 11 | Status: SHIPPED | OUTPATIENT
Start: 2024-12-02

## 2024-12-02 RX ORDER — LOSARTAN POTASSIUM 50 MG/1
50 TABLET ORAL DAILY
COMMUNITY
End: 2024-12-02 | Stop reason: SDUPTHER

## 2024-12-02 NOTE — TELEPHONE ENCOUNTER
BP is very high, add [id number] mg po daily and check BMP in one week. Monitor BP and heart rate daily and call me in one week.

## 2024-12-02 NOTE — TELEPHONE ENCOUNTER
Patient's wife (Pina) called with BP readings.  She did not record any heart rates.    164/86  166/86  184/86  176/82  178/84  182/86  176/88 today

## 2024-12-02 NOTE — TELEPHONE ENCOUNTER
Patient's wife (Pina) notified of Dr. Ram's assessment/recommendations. Losartan e-scribed. Order placed for BMP.

## 2024-12-04 ENCOUNTER — HOSPITAL ENCOUNTER (OUTPATIENT)
Age: 64
Discharge: HOME | End: 2024-12-04
Payer: COMMERCIAL

## 2024-12-04 ENCOUNTER — TELEPHONE (OUTPATIENT)
Dept: CARDIOLOGY CLINIC | Age: 64
End: 2024-12-04

## 2024-12-04 DIAGNOSIS — I10 HYPERTENSION, UNSPECIFIED TYPE: ICD-10-CM

## 2024-12-04 DIAGNOSIS — N18.31 STAGE 3A CHRONIC KIDNEY DISEASE (HCC): ICD-10-CM

## 2024-12-04 DIAGNOSIS — I10 HYPERTENSION, UNSPECIFIED TYPE: Primary | ICD-10-CM

## 2024-12-04 LAB
25(OH)D3 SERPL-MCNC: 29.5 NG/ML (ref 30–100)
ALBUMIN SERPL-MCNC: 4.4 G/DL (ref 3.5–5.2)
ALBUMIN SERPL-MCNC: 4.5 G/DL (ref 3.5–5.2)
ALP SERPL-CCNC: 51 U/L (ref 40–129)
ALP SERPL-CCNC: 52 U/L (ref 40–129)
ALT SERPL-CCNC: 21 U/L (ref 0–40)
ALT SERPL-CCNC: 22 U/L (ref 0–40)
ANION GAP SERPL CALCULATED.3IONS-SCNC: 12 MMOL/L (ref 7–16)
AST SERPL-CCNC: 21 U/L (ref 0–39)
AST SERPL-CCNC: 21 U/L (ref 0–39)
BASOPHILS # BLD: 0.06 K/UL (ref 0–0.2)
BASOPHILS NFR BLD: 1 % (ref 0–2)
BILIRUB SERPL-MCNC: 0.6 MG/DL (ref 0–1.2)
BILIRUB SERPL-MCNC: 0.7 MG/DL (ref 0–1.2)
BUN SERPL-MCNC: 29 MG/DL (ref 6–23)
CALCIUM SERPL-MCNC: 9.7 MG/DL (ref 8.6–10.2)
CALCIUM SERPL-MCNC: 9.7 MG/DL (ref 8.6–10.2)
CALCIUM SERPL-MCNC: 9.8 MG/DL (ref 8.6–10.2)
CHLORIDE SERPL-SCNC: 105 MMOL/L (ref 98–107)
CHOLEST SERPL-MCNC: 202 MG/DL
CK SERPL-CCNC: 258 U/L (ref 20–200)
CO2 SERPL-SCNC: 23 MMOL/L (ref 22–29)
CREAT SERPL-MCNC: 1.4 MG/DL (ref 0.7–1.2)
CREAT SERPL-MCNC: 1.5 MG/DL (ref 0.7–1.2)
CREAT SERPL-MCNC: 1.5 MG/DL (ref 0.7–1.2)
EOSINOPHIL # BLD: 0.41 K/UL (ref 0.05–0.5)
EOSINOPHILS RELATIVE PERCENT: 6 % (ref 0–6)
ERYTHROCYTE [DISTWIDTH] IN BLOOD BY AUTOMATED COUNT: 12.9 % (ref 11.5–15)
GFR, ESTIMATED: 53 ML/MIN/1.73M2
GFR, ESTIMATED: 53 ML/MIN/1.73M2
GFR, ESTIMATED: 54 ML/MIN/1.73M2
GLUCOSE SERPL-MCNC: 122 MG/DL (ref 74–99)
HBA1C MFR BLD: 6.1 % (ref 4–5.6)
HCT VFR BLD AUTO: 41.5 % (ref 37–54)
HDLC SERPL-MCNC: 40 MG/DL
HGB BLD-MCNC: 14 G/DL (ref 12.5–16.5)
IMM GRANULOCYTES # BLD AUTO: 0.05 K/UL (ref 0–0.58)
IMM GRANULOCYTES NFR BLD: 1 % (ref 0–5)
LDLC SERPL CALC-MCNC: 107 MG/DL
LYMPHOCYTES NFR BLD: 2.69 K/UL (ref 1.5–4)
LYMPHOCYTES RELATIVE PERCENT: 38 % (ref 20–42)
MCH RBC QN AUTO: 31.4 PG (ref 26–35)
MCHC RBC AUTO-ENTMCNC: 33.7 G/DL (ref 32–34.5)
MCV RBC AUTO: 93 FL (ref 80–99.9)
MONOCYTES NFR BLD: 0.85 K/UL (ref 0.1–0.95)
MONOCYTES NFR BLD: 12 % (ref 2–12)
NEUTROPHILS NFR BLD: 43 % (ref 43–80)
NEUTS SEG NFR BLD: 3.1 K/UL (ref 1.8–7.3)
PHOSPHATE SERPL-MCNC: 4.1 MG/DL (ref 2.5–4.5)
PLATELET # BLD AUTO: 362 K/UL (ref 130–450)
PMV BLD AUTO: 8.5 FL (ref 7–12)
POTASSIUM SERPL-SCNC: 4 MMOL/L (ref 3.5–5)
POTASSIUM SERPL-SCNC: 4.1 MMOL/L (ref 3.5–5)
POTASSIUM SERPL-SCNC: 4.1 MMOL/L (ref 3.5–5)
PROT SERPL-MCNC: 7.2 G/DL (ref 6.4–8.3)
PROT SERPL-MCNC: 7.2 G/DL (ref 6.4–8.3)
PTH-INTACT SERPL-MCNC: 23.1 PG/ML (ref 15–65)
RBC # BLD AUTO: 4.46 M/UL (ref 3.8–5.8)
SODIUM SERPL-SCNC: 140 MMOL/L (ref 132–146)
TRIGL SERPL-MCNC: 274 MG/DL
VLDLC SERPL CALC-MCNC: 55 MG/DL
WBC OTHER # BLD: 7.2 K/UL (ref 4.5–11.5)

## 2024-12-04 PROCEDURE — 82550 ASSAY OF CK (CPK): CPT

## 2024-12-04 PROCEDURE — 80061 LIPID PANEL: CPT

## 2024-12-04 PROCEDURE — 80053 COMPREHEN METABOLIC PANEL: CPT

## 2024-12-04 PROCEDURE — 85025 COMPLETE CBC W/AUTO DIFF WBC: CPT

## 2024-12-04 PROCEDURE — 36415 COLL VENOUS BLD VENIPUNCTURE: CPT

## 2024-12-04 PROCEDURE — 84100 ASSAY OF PHOSPHORUS: CPT

## 2024-12-04 PROCEDURE — 83970 ASSAY OF PARATHORMONE: CPT

## 2024-12-04 PROCEDURE — 80048 BASIC METABOLIC PNL TOTAL CA: CPT

## 2024-12-04 PROCEDURE — 83036 HEMOGLOBIN GLYCOSYLATED A1C: CPT

## 2024-12-04 PROCEDURE — 82306 VITAMIN D 25 HYDROXY: CPT

## 2024-12-04 NOTE — TELEPHONE ENCOUNTER
----- Message from Dr. Aguilar Ram MD sent at 12/4/2024  3:30 PM EST -----  No significant change in his renal functions.  Continue current medication.  Follow low-salt diet.  Check another BMP in 2 weeks.

## 2024-12-05 ENCOUNTER — APPOINTMENT (OUTPATIENT)
Dept: CT IMAGING | Age: 64
End: 2024-12-05
Payer: COMMERCIAL

## 2024-12-05 ENCOUNTER — HOSPITAL ENCOUNTER (INPATIENT)
Age: 64
LOS: 1 days | Discharge: HOME | End: 2024-12-06
Attending: EMERGENCY MEDICINE | Admitting: STUDENT IN AN ORGANIZED HEALTH CARE EDUCATION/TRAINING PROGRAM
Payer: COMMERCIAL

## 2024-12-05 DIAGNOSIS — R29.90 STROKE-LIKE SYMPTOMS: Primary | ICD-10-CM

## 2024-12-05 LAB
ALBUMIN SERPL-MCNC: 4.7 G/DL (ref 3.5–5.2)
ALP SERPL-CCNC: 53 U/L (ref 40–129)
ALT SERPL-CCNC: 18 U/L (ref 0–40)
ANION GAP SERPL CALCULATED.3IONS-SCNC: 13 MMOL/L (ref 7–16)
AST SERPL-CCNC: 19 U/L (ref 0–39)
BASOPHILS # BLD: 0.08 K/UL (ref 0–0.2)
BASOPHILS NFR BLD: 1 % (ref 0–2)
BILIRUB SERPL-MCNC: 0.8 MG/DL (ref 0–1.2)
BUN SERPL-MCNC: 23 MG/DL (ref 6–23)
CALCIUM SERPL-MCNC: 10.5 MG/DL (ref 8.6–10.2)
CHLORIDE SERPL-SCNC: 106 MMOL/L (ref 98–107)
CO2 SERPL-SCNC: 24 MMOL/L (ref 22–29)
CREAT SERPL-MCNC: 1.5 MG/DL (ref 0.7–1.2)
EKG ATRIAL RATE: 75 BPM
EKG P AXIS: 49 DEGREES
EKG P-R INTERVAL: 226 MS
EKG Q-T INTERVAL: 396 MS
EKG QRS DURATION: 112 MS
EKG QTC CALCULATION (BAZETT): 442 MS
EKG R AXIS: -34 DEGREES
EKG T AXIS: 115 DEGREES
EKG VENTRICULAR RATE: 75 BPM
EOSINOPHIL # BLD: 0.32 K/UL (ref 0.05–0.5)
EOSINOPHILS RELATIVE PERCENT: 4 % (ref 0–6)
ERYTHROCYTE [DISTWIDTH] IN BLOOD BY AUTOMATED COUNT: 12.9 % (ref 11.5–15)
GFR, ESTIMATED: 51 ML/MIN/1.73M2
GLUCOSE SERPL-MCNC: 114 MG/DL (ref 74–99)
HCT VFR BLD AUTO: 42.3 % (ref 37–54)
HGB BLD-MCNC: 14.3 G/DL (ref 12.5–16.5)
IMM GRANULOCYTES # BLD AUTO: 0.05 K/UL (ref 0–0.58)
IMM GRANULOCYTES NFR BLD: 1 % (ref 0–5)
LACTATE BLDV-SCNC: 1.8 MMOL/L (ref 0.5–2.2)
LYMPHOCYTES NFR BLD: 2.38 K/UL (ref 1.5–4)
LYMPHOCYTES RELATIVE PERCENT: 33 % (ref 20–42)
MCH RBC QN AUTO: 31.5 PG (ref 26–35)
MCHC RBC AUTO-ENTMCNC: 33.8 G/DL (ref 32–34.5)
MCV RBC AUTO: 93.2 FL (ref 80–99.9)
MONOCYTES NFR BLD: 0.74 K/UL (ref 0.1–0.95)
MONOCYTES NFR BLD: 10 % (ref 2–12)
NEUTROPHILS NFR BLD: 51 % (ref 43–80)
NEUTS SEG NFR BLD: 3.7 K/UL (ref 1.8–7.3)
PLATELET # BLD AUTO: 375 K/UL (ref 130–450)
PMV BLD AUTO: 8.4 FL (ref 7–12)
POTASSIUM SERPL-SCNC: 4.5 MMOL/L (ref 3.5–5)
PROT SERPL-MCNC: 7.9 G/DL (ref 6.4–8.3)
RBC # BLD AUTO: 4.54 M/UL (ref 3.8–5.8)
SODIUM SERPL-SCNC: 143 MMOL/L (ref 132–146)
WBC OTHER # BLD: 7.3 K/UL (ref 4.5–11.5)

## 2024-12-05 PROCEDURE — 2060000000 HC ICU INTERMEDIATE R&B

## 2024-12-05 PROCEDURE — 70450 CT HEAD/BRAIN W/O DYE: CPT

## 2024-12-05 PROCEDURE — 85025 COMPLETE CBC W/AUTO DIFF WBC: CPT

## 2024-12-05 PROCEDURE — 2580000003 HC RX 258: Performed by: EMERGENCY MEDICINE

## 2024-12-05 PROCEDURE — 93010 ELECTROCARDIOGRAM REPORT: CPT | Performed by: INTERNAL MEDICINE

## 2024-12-05 PROCEDURE — 93005 ELECTROCARDIOGRAM TRACING: CPT | Performed by: EMERGENCY MEDICINE

## 2024-12-05 PROCEDURE — 83605 ASSAY OF LACTIC ACID: CPT

## 2024-12-05 PROCEDURE — 80053 COMPREHEN METABOLIC PANEL: CPT

## 2024-12-05 PROCEDURE — 99285 EMERGENCY DEPT VISIT HI MDM: CPT

## 2024-12-05 RX ORDER — SODIUM CHLORIDE 9 MG/ML
INJECTION, SOLUTION INTRAVENOUS CONTINUOUS
Status: DISCONTINUED | OUTPATIENT
Start: 2024-12-05 | End: 2024-12-06

## 2024-12-05 RX ADMIN — SODIUM CHLORIDE: 9 INJECTION, SOLUTION INTRAVENOUS at 15:51

## 2024-12-05 ASSESSMENT — PAIN - FUNCTIONAL ASSESSMENT
PAIN_FUNCTIONAL_ASSESSMENT: NONE - DENIES PAIN
PAIN_FUNCTIONAL_ASSESSMENT: NONE - DENIES PAIN

## 2024-12-05 ASSESSMENT — LIFESTYLE VARIABLES
HOW MANY STANDARD DRINKS CONTAINING ALCOHOL DO YOU HAVE ON A TYPICAL DAY: PATIENT DOES NOT DRINK
HOW OFTEN DO YOU HAVE A DRINK CONTAINING ALCOHOL: NEVER

## 2024-12-05 NOTE — ED PROVIDER NOTES
[facility] EMERGENCY DEPARTMENT  EMERGENCY DEPARTMENT ENCOUNTER        Pt Name: Cain Bennett  MRN: [id number]  Birthdate 1960  Date of evaluation: 12/5/2024  Provider: Esther Caldera DO  PCP: Reza Limon MD  Note Started: 2:12 PM EST 12/5/24    CHIEF COMPLAINT       Chief Complaint   Patient presents with    Hand Pain     Left hand pain, numbness started today, happened 25min PTA, for past 2 days felt light headed, now feels like left side of body feels funny to him        HISTORY OF PRESENT ILLNESS: 1 or more Elements   History From: patient    Limitations to history : None    Cain Bennett is a 64 y.o. male who presents with left hand pain and spasm of the hand and forearm when he went to  a lunch tray around 1:15pm today.  He reports he felt like he had no control over his left hand and forearm and it included some paresthesia.  Shortly after that he started feeling paresthesia in his left leg as well as some difficulty moving the leg at the knee.  The symptoms have all resolved at this time.  He denies visual disturbances, aside from his baseline haziness as a resultant of retinal detachment status postsurgery on Monday.  He denies difficulty breathing or swallowing, dizziness.  He states the past couple days he has felt somewhat lightheaded but without headache.  He has history of A-fib, on Xarelto and did take his dose this morning.  His blood pressure/A-fib meds were recently changed.  Also reports history of chronic kidney disease, is scheduled to see nephrology soon.  The complaint has been persistent, moderate in severity, and worsened by nothing and now resolved. Patient denies fever/chills, sore throat, cough, congestion, chest pain, shortness of breath, edema, headache, visual disturbances, abdominal pain, nausea, vomiting, diarrhea, constipation, dysuria, hematuria, trauma, neck or back pain, rash or other complaints.      Nursing Notes were all  reviewed and agreed with or any disagreements were addressed in the HPI.    REVIEW OF SYSTEMS :           Positives and Pertinent negatives as per HPI.     SURGICAL HISTORY     Past Surgical History:   Procedure Laterality Date    COLONOSCOPY  10/13/11    COLONOSCOPY  11/09/2016    DR BARRAZA    COLONOSCOPY N/A 8/27/2024    COLONOSCOPY POLYPECTOMY SNARE/BIOPSY performed by Herbert Branch MD at [facility]COPY    ECHO COMPL W DOP COLOR FLOW  2/27/2013         NASAL SINUS SURGERY      times 4    SINUS ENDOSCOPY Bilateral 11/4/2022    LEFT ENDOSCOPIC MEDIAL ANTROSTOMY RIGHT MAXILLARY SINUSOTOMY WITH REMOVAL OF SOFT TISSUE BILATERALLY performed by Chele Amezcua Jr., MD at [facility] OR    SINUS SURGERY Bilateral 7/18/2014    TONSILLECTOMY      UPPER GASTROINTESTINAL ENDOSCOPY N/A 1/16/2024    EGD BIOPSY performed by Herbert Branch MD at [facility]COPY    UPPER GASTROINTESTINAL ENDOSCOPY N/A 8/27/2024    ESOPHAGOGASTRODUODENOSCOPY BIOPSY performed by Herbert Branch MD at [facility]COPY       CURRENTMEDICATIONS       Previous Medications    CHOLECALCIFEROL (VITAMIN D3) 25 MCG TABS    Take 1 tablet by mouth daily    DILTIAZEM (CARDIZEM CD) 120 MG EXTENDED RELEASE CAPSULE    Take 1 capsule by mouth daily CHANGING FROM METOPROLOL    FENOFIBRATE (TRIGLIDE) 160 MG TABLET    Take 1 tablet by mouth daily    LOSARTAN (COZAAR) 50 MG TABLET    Take 1 tablet by mouth daily    METFORMIN (GLUCOPHAGE) 500 MG TABLET    Take 1 tablet by mouth 2 times daily (with meals)    MOMETASONE (NASONEX) 50 MCG/ACT NASAL SPRAY    2 sprays by Nasal route daily as needed    MULTIPLE VITAMINS-MINERALS (THERAPEUTIC MULTIVITAMIN-MINERALS) TABLET    Take 1 tablet by mouth daily    RIVAROXABAN (XARELTO) 20 MG TABS TABLET    Take 1 tablet by mouth daily (with breakfast) CHANGING FROM ELIQUIS    ROSUVASTATIN (CRESTOR) 10 MG TABLET    Take 1 tablet by mouth every evening       ALLERGIES     Lopressor [metoprolol]    FAMILYHISTORY       Family History   Problem

## 2024-12-05 NOTE — CARE COORDINATION
Stroke like symptoms from SEA to [facility]  MRA head and neck given CKD   Neurology consultation  On xarelto for a fib

## 2024-12-05 NOTE — CONSULTS
Is a 64-year-old gentleman with hypertension, hyperlipidemia, diabetes, coronary artery disease, atrial fibrillation on Xarelto who had transient left arm weakness causing him to drop a tray of food while at work with full resolution of symptoms.  I recommended treating this as a transient ischemic attack or small stroke.  - Continue Xarelto, aspirin 81 mg for now  - Continue rosuvastatin  - Permissive hypertension, euvolemia  - MRI brain with MRA head and neck (did not want to give CTA contrast due to renal disease but GFR in the 50s and should tolerate gadolinium easily) to evaluate for acute infarct, CBI's, CMB's, and any significant right ICA stenosis that may require treatment for asymptomatic carotid.  - If carotid disease found, will need to be evaluated for CEA versus DARWIN and change to antithrombotic regimen preintervention  - If large infarct seen (unlikely based on NIH stroke scale score), can reconsider ongoing treatment with Xarelto for secondary stroke prevention from atrial fibrillation    Cain Bennett, was discussed with the emergency room physician, Dr. Caldera in a interprofessional consultation with review of the patient's chart, imaging, and discussion of plan and next steps.    The patient was located at Facility ([place]):   Provider was located at Home ([place], [place]):    Confirm you are appropriately licensed, registered, or certified to deliver care in the state where the patient is located as indicated above. If you are not or unsure, please re-schedule the visit: Yes, I confirm.      Total time spent for this encounter:  22 minutes    --Noah Mcbride MD on 12/5/2024 at 5:01 PM    An electronic signature was used to authenticate this note.

## 2024-12-06 ENCOUNTER — APPOINTMENT (OUTPATIENT)
Dept: MRI IMAGING | Age: 64
End: 2024-12-06
Payer: COMMERCIAL

## 2024-12-06 VITALS
DIASTOLIC BLOOD PRESSURE: 74 MMHG | OXYGEN SATURATION: 95 % | TEMPERATURE: 98 F | RESPIRATION RATE: 18 BRPM | HEIGHT: 71 IN | HEART RATE: 75 BPM | WEIGHT: 253 LBS | SYSTOLIC BLOOD PRESSURE: 133 MMHG | BODY MASS INDEX: 35.42 KG/M2

## 2024-12-06 LAB
ALBUMIN SERPL-MCNC: 4.4 G/DL (ref 3.5–5.2)
ALP SERPL-CCNC: 49 U/L (ref 40–129)
ALT SERPL-CCNC: 21 U/L (ref 0–40)
ANION GAP SERPL CALCULATED.3IONS-SCNC: 10 MMOL/L (ref 7–16)
AST SERPL-CCNC: 20 U/L (ref 0–39)
BILIRUB SERPL-MCNC: 0.7 MG/DL (ref 0–1.2)
BUN SERPL-MCNC: 20 MG/DL (ref 6–23)
CALCIUM SERPL-MCNC: 9.4 MG/DL (ref 8.6–10.2)
CHLORIDE SERPL-SCNC: 106 MMOL/L (ref 98–107)
CHOLEST SERPL-MCNC: 175 MG/DL
CO2 SERPL-SCNC: 24 MMOL/L (ref 22–29)
CREAT SERPL-MCNC: 1.1 MG/DL (ref 0.7–1.2)
GFR, ESTIMATED: 72 ML/MIN/1.73M2
GLUCOSE BLD-MCNC: 115 MG/DL (ref 74–99)
GLUCOSE BLD-MCNC: 122 MG/DL (ref 74–99)
GLUCOSE SERPL-MCNC: 143 MG/DL (ref 74–99)
HBA1C MFR BLD: 6.2 % (ref 4–5.6)
HDLC SERPL-MCNC: 34 MG/DL
LDLC SERPL CALC-MCNC: 99 MG/DL
POTASSIUM SERPL-SCNC: 3.8 MMOL/L (ref 3.5–5)
PROT SERPL-MCNC: 6.8 G/DL (ref 6.4–8.3)
SODIUM SERPL-SCNC: 140 MMOL/L (ref 132–146)
TRIGL SERPL-MCNC: 208 MG/DL
VLDLC SERPL CALC-MCNC: 42 MG/DL

## 2024-12-06 PROCEDURE — 6370000000 HC RX 637 (ALT 250 FOR IP): Performed by: STUDENT IN AN ORGANIZED HEALTH CARE EDUCATION/TRAINING PROGRAM

## 2024-12-06 PROCEDURE — 82947 ASSAY GLUCOSE BLOOD QUANT: CPT

## 2024-12-06 PROCEDURE — 70547 MR ANGIOGRAPHY NECK W/O DYE: CPT

## 2024-12-06 PROCEDURE — 97161 PT EVAL LOW COMPLEX 20 MIN: CPT

## 2024-12-06 PROCEDURE — 97530 THERAPEUTIC ACTIVITIES: CPT

## 2024-12-06 PROCEDURE — 80061 LIPID PANEL: CPT

## 2024-12-06 PROCEDURE — 80053 COMPREHEN METABOLIC PANEL: CPT

## 2024-12-06 PROCEDURE — 99222 1ST HOSP IP/OBS MODERATE 55: CPT | Performed by: NURSE PRACTITIONER

## 2024-12-06 PROCEDURE — 36415 COLL VENOUS BLD VENIPUNCTURE: CPT

## 2024-12-06 PROCEDURE — 83036 HEMOGLOBIN GLYCOSYLATED A1C: CPT

## 2024-12-06 PROCEDURE — 70551 MRI BRAIN STEM W/O DYE: CPT

## 2024-12-06 PROCEDURE — 70544 MR ANGIOGRAPHY HEAD W/O DYE: CPT

## 2024-12-06 PROCEDURE — 97165 OT EVAL LOW COMPLEX 30 MIN: CPT

## 2024-12-06 PROCEDURE — 2580000003 HC RX 258: Performed by: EMERGENCY MEDICINE

## 2024-12-06 RX ORDER — SODIUM CHLORIDE 0.9 % (FLUSH) 0.9 %
10 SYRINGE (ML) INJECTION EVERY 12 HOURS SCHEDULED
Status: DISCONTINUED | OUTPATIENT
Start: 2024-12-06 | End: 2024-12-06 | Stop reason: HOSPADM

## 2024-12-06 RX ORDER — ACETAMINOPHEN 325 MG/1
650 TABLET ORAL EVERY 6 HOURS PRN
Status: DISCONTINUED | OUTPATIENT
Start: 2024-12-06 | End: 2024-12-06 | Stop reason: HOSPADM

## 2024-12-06 RX ORDER — ENOXAPARIN SODIUM 100 MG/ML
30 INJECTION SUBCUTANEOUS 2 TIMES DAILY
Status: DISCONTINUED | OUTPATIENT
Start: 2024-12-06 | End: 2024-12-06 | Stop reason: ALTCHOICE

## 2024-12-06 RX ORDER — SODIUM CHLORIDE 9 MG/ML
INJECTION, SOLUTION INTRAVENOUS PRN
Status: DISCONTINUED | OUTPATIENT
Start: 2024-12-06 | End: 2024-12-06 | Stop reason: HOSPADM

## 2024-12-06 RX ORDER — DILTIAZEM HYDROCHLORIDE 120 MG/1
120 CAPSULE, COATED, EXTENDED RELEASE ORAL DAILY
Status: DISCONTINUED | OUTPATIENT
Start: 2024-12-06 | End: 2024-12-06 | Stop reason: HOSPADM

## 2024-12-06 RX ORDER — DEXTROSE MONOHYDRATE 100 MG/ML
INJECTION, SOLUTION INTRAVENOUS CONTINUOUS PRN
Status: DISCONTINUED | OUTPATIENT
Start: 2024-12-06 | End: 2024-12-06 | Stop reason: HOSPADM

## 2024-12-06 RX ORDER — SODIUM CHLORIDE 0.9 % (FLUSH) 0.9 %
10 SYRINGE (ML) INJECTION PRN
Status: DISCONTINUED | OUTPATIENT
Start: 2024-12-06 | End: 2024-12-06 | Stop reason: HOSPADM

## 2024-12-06 RX ORDER — INSULIN LISPRO 100 [IU]/ML
0-8 INJECTION, SOLUTION INTRAVENOUS; SUBCUTANEOUS
Status: DISCONTINUED | OUTPATIENT
Start: 2024-12-06 | End: 2024-12-06 | Stop reason: HOSPADM

## 2024-12-06 RX ORDER — GLUCAGON 1 MG/ML
1 KIT INJECTION PRN
Status: DISCONTINUED | OUTPATIENT
Start: 2024-12-06 | End: 2024-12-06 | Stop reason: HOSPADM

## 2024-12-06 RX ORDER — SENNOSIDES A AND B 8.6 MG/1
1 TABLET, FILM COATED ORAL DAILY PRN
Status: DISCONTINUED | OUTPATIENT
Start: 2024-12-06 | End: 2024-12-06 | Stop reason: HOSPADM

## 2024-12-06 RX ORDER — ACETAMINOPHEN 650 MG/1
650 SUPPOSITORY RECTAL EVERY 6 HOURS PRN
Status: DISCONTINUED | OUTPATIENT
Start: 2024-12-06 | End: 2024-12-06 | Stop reason: HOSPADM

## 2024-12-06 RX ORDER — ROSUVASTATIN CALCIUM 10 MG/1
10 TABLET, COATED ORAL NIGHTLY
Status: DISCONTINUED | OUTPATIENT
Start: 2024-12-06 | End: 2024-12-06 | Stop reason: HOSPADM

## 2024-12-06 RX ORDER — ONDANSETRON 2 MG/ML
4 INJECTION INTRAMUSCULAR; INTRAVENOUS EVERY 6 HOURS PRN
Status: DISCONTINUED | OUTPATIENT
Start: 2024-12-06 | End: 2024-12-06 | Stop reason: HOSPADM

## 2024-12-06 RX ORDER — LOSARTAN POTASSIUM 50 MG/1
50 TABLET ORAL DAILY
Status: DISCONTINUED | OUTPATIENT
Start: 2024-12-06 | End: 2024-12-06 | Stop reason: HOSPADM

## 2024-12-06 RX ORDER — ONDANSETRON 4 MG/1
4 TABLET, ORALLY DISINTEGRATING ORAL EVERY 8 HOURS PRN
Status: DISCONTINUED | OUTPATIENT
Start: 2024-12-06 | End: 2024-12-06 | Stop reason: HOSPADM

## 2024-12-06 RX ADMIN — SODIUM CHLORIDE: 9 INJECTION, SOLUTION INTRAVENOUS at 01:12

## 2024-12-06 RX ADMIN — RIVAROXABAN 20 MG: 20 TABLET, FILM COATED ORAL at 10:05

## 2024-12-06 NOTE — PROGRESS NOTES
soft palate elevation  Normal   IX,X: gag reflex    XI: trapezius strength  5/5   XI: sternocleidomastoid strength 5/5   XI: neck extension strength  5/5   XII: tongue strength  Normal     Motor:  Strong bilateral handgrips  Grossly 5/5 throughout  Normal bulk and tone   No drift  No abnormal movements    Sensory:  LT normal  Vibration normal     Coordination:   FN, FFM and ANNAMARIE normal  HS normal    Gait:  Not tested today    DTR:   + 1 throughout    No Babinskis  No Viveros's     No other pathological reflexes    Laboratory/Radiology:     CBC with Differential:    Lab Results   Component Value Date/Time    WBC 7.3 12/05/2024 02:15 PM    RBC 4.54 12/05/2024 02:15 PM    HGB 14.3 12/05/2024 02:15 PM    HCT 42.3 12/05/2024 02:15 PM     12/05/2024 02:15 PM    MCV 93.2 12/05/2024 02:15 PM    MCH 31.5 12/05/2024 02:15 PM    MCHC 33.8 12/05/2024 02:15 PM    RDW 12.9 12/05/2024 02:15 PM    NRBC 3 01/17/2024 05:12 AM    METASPCT 1 01/16/2024 04:52 AM    LYMPHOPCT 33 12/05/2024 02:15 PM    MONOPCT 10 12/05/2024 02:15 PM    MYELOPCT 4 01/17/2024 05:12 AM    EOSPCT 4 12/05/2024 02:15 PM    BASOPCT 1 12/05/2024 02:15 PM    MONOSABS 0.74 12/05/2024 02:15 PM    LYMPHSABS 2.38 12/05/2024 02:15 PM    EOSABS 0.32 12/05/2024 02:15 PM    BASOSABS 0.08 12/05/2024 02:15 PM     CMP:    Lab Results   Component Value Date/Time     12/05/2024 02:15 PM    K 4.5 12/05/2024 02:15 PM     12/05/2024 02:15 PM    CO2 24 12/05/2024 02:15 PM    BUN 23 12/05/2024 02:15 PM    CREATININE 1.5 12/05/2024 02:15 PM    GFRAA >60 10/07/2022 07:24 AM    LABGLOM 51 12/05/2024 02:15 PM    LABGLOM 53 04/14/2024 11:01 AM    GLUCOSE 114 12/05/2024 02:15 PM    GLUCOSE 123 05/26/2012 07:55 AM    CALCIUM 10.5 12/05/2024 02:15 PM    BILITOT 0.8 12/05/2024 02:15 PM    ALKPHOS 53 12/05/2024 02:15 PM    AST 19 12/05/2024 02:15 PM    ALT 18 12/05/2024 02:15 PM     U/A:    Lab Results   Component Value Date/Time    COLORU Yellow 04/14/2024 11:00 AM

## 2024-12-06 NOTE — H&P
Internal Medicine History & Physical     Name: Cain Bennett  : 1960  Chief Complaint: Hand Pain (Left hand pain, numbness started today, happened 25min PTA, for past 2 days felt light headed, now feels like left side of body feels funny to him )  Primary Care Physician: Reza Limon MD  Admission date: 2024  Date of service: 2024     History of Present Illness  Cain is a 64 y.o. year old male who presented with a chief complaint of L hand arm and LLE numbness tingling and weakness which occurred yesterday. This was abrupt in onset and lasted 20 minutes it made him very nervous he became nauseas during this time as well. He called his wife and he went the hospital he also notes that he drove himself to the hospital.  He has a history of a fib and is supposed to be on Eliquis. He saw his PCP in  and states he felt like his medications were making him sick, so he was told to stop the Crestor but he actually stopped the Eliquis and states he felt better. He went into a fib in August and then he was placed on Xarelto. He endorse compliance with Xarelto since. The main medication \"adverse effects\" he was having is diarrhea.     He had EGD and C Scope 24 which he tells me were \"clear\"    He has a family history of colon cancer grandmother 70's diagnosed     Denies smoking, rare etoh, denies drugs    Religiously uses CPAP at home.     Past Medical History:   Diagnosis Date    Atrial fibrillation (HCC)     Chronic sinusitis     disease - for surgical encounter 14    Diabetes mellitus (HCC)     dx 10/13; type II    Environmental allergies     Heart murmur     Hyperlipidemia     Hypertension     stable per patient    Sleep apnea     uses c pap       Past Surgical History:   Procedure Laterality Date    COLONOSCOPY  10/13/11    COLONOSCOPY  2016    DR BARRAZA    COLONOSCOPY N/A 2024    COLONOSCOPY POLYPECTOMY SNARE/BIOPSY performed by Herbert Branch MD at [facility]    ECHO  Acute sinusitis 01/12/2024    Benign prostatic hyperplasia 01/12/2024    Depressive disorder 01/12/2024    Glaucoma suspect 01/12/2024    Hypertensive heart disease without congestive heart failure 01/12/2024    Moderate protein-calorie malnutrition (HCC) 01/12/2024    Paroxysmal atrial fibrillation (HCC) 01/12/2024    Pneumonia 01/12/2024    Benign hypertensive kidney disease 01/05/2024    Stage 3a chronic kidney disease (HCC) 01/05/2024    MICHELLE (acute kidney injury) (HCC) 12/26/2023    Diabetes mellitus (HCC)      dx 10/13; type II      Hypertension 03/21/2013     A. Echo 2/27/13 (JS): severe LVH, EF 65-70%      Heart valve disease 03/21/2013     A. Echo 02/2013 (BRITTNY): Mild AS/AI/MR -  Aortic valve mean gradient 11      Hyperlipidemia 03/21/2013       Plan  Stroke like symptoms   CT head wo con negative for acute abnormality   Obtain MRI brain wo con   Obtain MRA head and neck given CKD and inability to administer IV con   LDL 92  A1C 6.2  Neurology consultation   PT OT evaluation     Atrial fibrillation   Continue home regimen   Monitor   Xarelto has been continued   Cardizem     Discharge plan: imaging negative cleared for dc per neuro stable home     Duane Scales MD  Internal medicine   12/6/2024  8:37 AM    I can be reached through [employer].    NOTE:  This report was transcribed using voice recognition software.  Every effort was made to ensure accuracy; however, inadvertent computerized transcription errors may be present.

## 2024-12-06 NOTE — DISCHARGE SUMMARY
The patient was discharged on the same day as history and physical.  Please see history and physical as well as imaging studies, consult and evaluations, and nurse's notes.    Electronically signed by Duane Scales MD on 12/6/2024 at 4:05 PM

## 2024-12-06 NOTE — PROGRESS NOTES
Physical Therapy  Physical Therapy Initial Assessment    Name: Cain Bennett  : 1960  MRN: [id number]      Date of Service: 2024    Evaluating PT:  Wood Beasley, PT, DPT [id number]    Room #:  8516/8516-A  Diagnosis:  Stroke-like symptoms [R29.90]  PMHx/PSHx:   has a past medical history of Atrial fibrillation (HCC), Chronic sinusitis, Diabetes mellitus (HCC), Environmental allergies, Heart murmur, Hyperlipidemia, Hypertension, and Sleep apnea.   has a past surgical history that includes Tonsillectomy; Nasal sinus surgery; ECHO Compl W Dop Color Flow (2013); sinus surgery (Bilateral, 2014); Colonoscopy (10/13/11); Colonoscopy (2016); Sinus endoscopy (Bilateral, 2022); Upper gastrointestinal endoscopy (N/A, 2024); Upper gastrointestinal endoscopy (N/A, 2024); and Colonoscopy (N/A, 2024).  Procedure/Surgery:  None  Precautions:  Falls  Equipment Needs:  None    SUBJECTIVE:    Pt lives with wife in a 1.5 story home with 2 stair(s) to enter and 1 rail(s).  Bed is on 1st floor and bath is on 1st floor.  Pt ambulated with no AD prior to admission.    OBJECTIVE:   Initial Evaluation  Date: 2024 Treatment Short Term/ Long Term   Goals   AM-PAC 6 Clicks      Was pt agreeable to Eval/treatment? Yes     Does pt have pain? No     Bed Mobility  Rolling: NT  Supine to sit: Independent  Sit to supine: NT  Scooting: NT  Rolling: Independent  Supine to sit: Independent  Sit to supine: Independent  Scooting: Independent   Transfers Sit to stand: Independent  Stand to sit: Independent  Stand pivot: NT  Sit to stand: Independent  Stand to sit: Independent  Stand pivot: Independent   Ambulation    340 feet with no AD Supervision  1000 feet with no AD Independent   Stair negotiation: ascended and descended  4 step(s) with 1 rail(s) Supervision  12 step(s) with 1 rail(s) Mod I   ROM BUE:  See OT note  BLE:  WFL     Strength BUE:  See OT note  BLE:  Grossly 5/5     Balance Sitting

## 2024-12-06 NOTE — PROGRESS NOTES
OCCUPATIONAL THERAPY INITIAL EVALUATION AND DISCHARGE  [facility] [address] [place], [place]       Date:2024                                                  Patient Name: Cain Bennett  MRN: [id number]  : 1960  Room: [facility]    Evaluating OT: Brennon Samaniego OTR/L [id number]    Referring Provider: Duane Scales MD      Specific Provider Orders/Date: OT evaluation and treatment 24 0245    Diagnosis:  Stroke-like symptoms [R29.90]      Pertinent Medical History:  has a past medical history of Atrial fibrillation (HCC), Chronic sinusitis, Diabetes mellitus (HCC), Environmental allergies, Heart murmur, Hyperlipidemia, Hypertension, and Sleep apnea.   Pt admitted to the hospital  with LUE numbness and decreased coordination; also reporting decreased strength in LLE     Orders received, chart reviewed, eval complete.     Precautions: none      Assessment of current deficits: N/A    OT PLAN OF CARE   OT POC based on physician orders, patient diagnosis and results of clinical assessment    Frequency/Duration: N/A   Specific OT Treatment to include: N/A    Modified Dorado Scale   Score     Description  0             No symptoms  1             No significant disability despite symptoms  2             Slight disability; able to look after own affairs  3             Moderate disability; able to ambulate without assist/ requires assist with ADLs  4             Moderate/Severe disability;requires assist to ambulate/assist with ADLs  5             Severe disability;bedridden/incontinent   6               Score:   0 - reports all have resolved     Recommended Adaptive Equipment: no needs    Home Living:  Pt lives with family - spouse and dtr who is at college    in a 1.5 story house with 2 steps to enter  and and 1 hand rail    Bedroom setup: main level  standard bed   Bathroom setup: main level    Equipment owned:   crutches  Goal: to go home      Patient and/or family were instructed on functional diagnosis, prognosis/goals and OT plan of care. Pt/family demonstrated understanding.       Eval Complexity:      Description  Performance deficits  Clinical decision making  Co-morbidities affecting occupational performance  Modification or assistance to complete eval    Low Complexity   1 to 3 [x]  Low []  None []  None []   Moderate Complexity   3 to 5 []  Mod [x]  Maybe []  Min to Mod [x]   High Complexity   5 or more []  High []  Yes [x]  Max []     The above evaluation is classified as low complexity based off the noted performance deficits, personal factors, co-morbidities, assistance required, and other factors as noted in the clinical evaluation and functional testing.     Time In: 1026  Time Out: 1040  Total Treatment Time: 0 minutes    Min Units   OT Eval Low 97165  x 1   OT Eval Medium 73590      OT Eval High 01686       OT Re-Eval 52850       Therapeutic Ex 80642       Therapeutic Activities 91669      ADL/Self Care 66595      Orthotic Management 51525       Neuro Re-Ed 11530       Non-Billable Time          Evaluation Time includes thorough review of current medical information, gathering information on past medical history/social history and prior level of function, completion of standardized testing/informal observation of tasks, assessment of data and education on plan of care and goals.          Brennon Samaniego OTR/L [id number]

## 2024-12-06 NOTE — PROGRESS NOTES
4 Eyes Skin Assessment     NAME:  Cain Bennett  YOB: 1960  MEDICAL RECORD NUMBER:  [id number]    The patient is being assessed for  Admission    I agree that at least one RN has performed a thorough Head to Toe Skin Assessment on the patient. ALL assessment sites listed below have been assessed.      Areas assessed by both nurses:    Head, Face, Ears, Shoulders, Back, Chest, Arms, Elbows, Hands, Sacrum. Buttock, Coccyx, Ischium, Legs. Feet and Heels, and Under Medical Devices         Does the Patient have a Wound? No noted wound(s)       Ky Prevention initiated by RN: No  Wound Care Orders initiated by RN: No    Pressure Injury (Stage 3,4, Unstageable, DTI, NWPT, and Complex wounds) if present, place Wound referral order by RN under : No    New Ostomies, if present place, Ostomy referral order under : No     Nurse 1 eSignature: Electronically signed by Zachary Cain RN on 12/6/24 at 2:46 AM EST    **SHARE this note so that the co-signing nurse can place an eSignature**    Nurse 2 eSignature: Electronically signed by Ruchi Schuler RN on 12/6/24 at 2:48 AM EST

## 2024-12-06 NOTE — PLAN OF CARE
Problem: Chronic Conditions and Co-morbidities  Goal: Patient's chronic conditions and co-morbidity symptoms are monitored and maintained or improved  Outcome: Progressing     Problem: Discharge Planning  Goal: Discharge to home or other facility with appropriate resources  Outcome: Progressing     Problem: Safety - Adult  Goal: Free from fall injury  Outcome: Progressing     Problem: Neurosensory - Adult  Goal: Achieves stable or improved neurological status  Outcome: Progressing  Goal: Achieves maximal functionality and self care  Outcome: Progressing

## 2024-12-13 NOTE — PROGRESS NOTES
Physician Progress Note      PATIENT:               FREDIS AVENDANO  CSN #:                  [id number]  :                       1960  ADMIT DATE:       2024 1:42 PM  DISCH DATE:        2024 4:56 PM  RESPONDING  PROVIDER #:        Duane Scales MD          QUERY TEXT:    Pt admitted with stroke like symptoms. Telestroke consult recommended treating   this as a transient ischemic attack or small stroke.  If possible, please   document in progress notes and discharge summary if you are evaluating and /or   treating any of the following:    The medical record reflects the following:  Risk Factors: afib  Clinical Indicators: Telestroke note \"hx atrial fibrillation on Xarelto   who had transient left arm weakness causing him to drop a tray of food while   at work with full resolution of symptoms.  I recommended treating this as a   transient ischemic attack or small stroke.\" MRA head: No acute intracranial   abnormality. No flow-limiting stenosis or major branch vessel occlusion in the   head or neck.  Treatment: Continue Xarelto, aspirin 81 mg for now, Permission HTN,   rosuvastatin, MRI/MRA head neck and brain  Options provided:  -- TIA ruled out, Acute stroke by clinical assessment  -- Embolic TIA due to cerebral embolism  -- TIA confirmed unable to further specify  -- TIA ruled out, stroke like symptoms were due to, Please specify  -- Other - I will add my own diagnosis  -- Disagree - Not applicable / Not valid  -- Disagree - Clinically unable to determine / Unknown  -- Refer to Clinical Documentation Reviewer    PROVIDER RESPONSE TEXT:    Provider is clinically unable to determine a response to this query.    Query created by: Edilia Nair on 2024 1:26 PM      QUERY TEXT:    Patient with atrial fibrillation and is maintained on Xarelto. If possible,   please document in progress notes and discharge summary if you are evaluating   and/or treating any of the following:?  ?  The medical

## 2024-12-23 ENCOUNTER — HOSPITAL ENCOUNTER (OUTPATIENT)
Age: 64
Discharge: HOME | End: 2024-12-23
Payer: COMMERCIAL

## 2024-12-23 DIAGNOSIS — N18.31 STAGE 3A CHRONIC KIDNEY DISEASE (HCC): ICD-10-CM

## 2024-12-23 DIAGNOSIS — I10 HYPERTENSION, UNSPECIFIED TYPE: ICD-10-CM

## 2024-12-23 LAB
ANION GAP SERPL CALCULATED.3IONS-SCNC: 12 MMOL/L (ref 7–16)
BUN SERPL-MCNC: 21 MG/DL (ref 6–23)
CALCIUM SERPL-MCNC: 10 MG/DL (ref 8.6–10.2)
CHLORIDE SERPL-SCNC: 103 MMOL/L (ref 98–107)
CO2 SERPL-SCNC: 26 MMOL/L (ref 22–29)
CREAT SERPL-MCNC: 1.2 MG/DL (ref 0.7–1.2)
GFR, ESTIMATED: 67 ML/MIN/1.73M2
GLUCOSE SERPL-MCNC: 216 MG/DL (ref 74–99)
POTASSIUM SERPL-SCNC: 4.1 MMOL/L (ref 3.5–5)
SODIUM SERPL-SCNC: 141 MMOL/L (ref 132–146)

## 2024-12-23 PROCEDURE — 80048 BASIC METABOLIC PNL TOTAL CA: CPT

## 2024-12-23 PROCEDURE — 36415 COLL VENOUS BLD VENIPUNCTURE: CPT

## 2025-02-28 ENCOUNTER — HOSPITAL ENCOUNTER (OUTPATIENT)
Age: 65
Discharge: HOME | End: 2025-02-28
Payer: MEDICARE

## 2025-02-28 LAB
FERRITIN SERPL-MCNC: 54 NG/ML
IRON SATN MFR SERPL: 22 % (ref 20–55)
IRON SERPL-MCNC: 97 UG/DL (ref 59–158)
TIBC SERPL-MCNC: 433 UG/DL (ref 250–450)

## 2025-02-28 PROCEDURE — 83540 ASSAY OF IRON: CPT

## 2025-02-28 PROCEDURE — 36415 COLL VENOUS BLD VENIPUNCTURE: CPT

## 2025-02-28 PROCEDURE — 82728 ASSAY OF FERRITIN: CPT

## 2025-02-28 PROCEDURE — 83550 IRON BINDING TEST: CPT

## 2025-03-17 ENCOUNTER — OFFICE VISIT (OUTPATIENT)
Age: 65
End: 2025-03-17
Payer: MEDICARE

## 2025-03-17 VITALS
DIASTOLIC BLOOD PRESSURE: 63 MMHG | HEIGHT: 71 IN | TEMPERATURE: 97.7 F | HEART RATE: 68 BPM | WEIGHT: 260.5 LBS | BODY MASS INDEX: 36.47 KG/M2 | RESPIRATION RATE: 18 BRPM | OXYGEN SATURATION: 97 % | SYSTOLIC BLOOD PRESSURE: 126 MMHG

## 2025-03-17 DIAGNOSIS — D64.9 ANEMIA, UNSPECIFIED TYPE: Primary | ICD-10-CM

## 2025-03-17 DIAGNOSIS — D64.9 ANEMIA, UNSPECIFIED TYPE: ICD-10-CM

## 2025-03-17 LAB
BASOPHILS ABSOLUTE: 0.05 K/UL (ref 0–0.2)
BASOPHILS RELATIVE PERCENT: 1 % (ref 0–2)
EOSINOPHILS ABSOLUTE: 0.31 K/UL (ref 0.05–0.5)
EOSINOPHILS RELATIVE PERCENT: 4 % (ref 0–6)
HCT VFR BLD CALC: 41.3 % (ref 37–54)
HEMOGLOBIN: 13.8 G/DL (ref 12.5–16.5)
IMMATURE GRANULOCYTES %: 1 % (ref 0–5)
IMMATURE GRANULOCYTES ABSOLUTE: 0.05 K/UL (ref 0–0.58)
LYMPHOCYTES ABSOLUTE: 2.44 K/UL (ref 1.5–4)
LYMPHOCYTES RELATIVE PERCENT: 35 % (ref 20–42)
MCH RBC QN AUTO: 31.7 PG (ref 26–35)
MCHC RBC AUTO-ENTMCNC: 33.4 G/DL (ref 32–34.5)
MCV RBC AUTO: 94.7 FL (ref 80–99.9)
MONOCYTES ABSOLUTE: 0.73 K/UL (ref 0.1–0.95)
MONOCYTES RELATIVE PERCENT: 10 % (ref 2–12)
NEUTROPHILS ABSOLUTE: 3.47 K/UL (ref 1.8–7.3)
NEUTROPHILS RELATIVE PERCENT: 49 % (ref 43–80)
PDW BLD-RTO: 12.6 % (ref 11.5–15)
PLATELET # BLD: 346 K/UL (ref 130–450)
PMV BLD AUTO: 9 FL (ref 7–12)
RBC # BLD: 4.36 M/UL (ref 3.8–5.8)
WBC # BLD: 7.1 K/UL (ref 4.5–11.5)

## 2025-03-17 PROCEDURE — 1123F ACP DISCUSS/DSCN MKR DOCD: CPT | Performed by: NURSE PRACTITIONER

## 2025-03-17 PROCEDURE — 99212 OFFICE O/P EST SF 10 MIN: CPT | Performed by: NURSE PRACTITIONER

## 2025-03-17 PROCEDURE — 3074F SYST BP LT 130 MM HG: CPT | Performed by: NURSE PRACTITIONER

## 2025-03-17 PROCEDURE — 3078F DIAST BP <80 MM HG: CPT | Performed by: NURSE PRACTITIONER

## 2025-03-17 NOTE — PROGRESS NOTES
Cain Bennett (:  1960) is a 64 y.o. male, here for evaluation of the following chief complaint(s):  Follow-up (6 month follow up )      SUBJECTIVE/OBJECTIVE:  HPI:    Cain is a very pleasant 64 year old gentleman that presents today for follow up on anemia    Patient tells me that he presented to the ER on 24 for possible CVA.  Imaging inconclusive    Lab work 24- HGB 14.3, HCT 42.3.  Ferritin 54, iron 97, iron sat 22%    Patient denies chest pain or SOB  He tells me that he had stool testing that was negative  Denies N/V, abdominal pain, BRBPR.  No weight loss, heartburn, or fevers      Colonoscopy-  Hemorrhoids found on perianal exam. The entire examined colon is normal. Three 3 to 8 mm polyps in the ascending colon, in the transverse colon and in the descending colon, removed with a cold snare. Resected and retrieved. Diverticulosis in the sigmoid colon. The distal rectum and anal verge are normal on retroflexion view.      EGD-  Normal esophagus. Z-line regular. Normal stomach. Biopsied. No actively bleeding or high risk lesions. Normal examined duodenum. Biopsied. No actively bleeding or high risk lesions. No AVMs. Normal examined jejunum. No actively bleeding or high risk lesions. No AVMs.      Diagnosis --   A.  Duodenum, biopsy: Gastric fundic heterotopia     B.  Stomach, antrum, biopsy: Mild chronic gastritis; negative for   intestinal metaplasia   Features of Helicobacter pylori gastritis are not identified.     C.  Colon polyps, polypectomy: Tubular adenoma and benign intramucosal lymphoid aggregates   Fragments of fecal/vegetable matter         ROS:  General: Patient denies n/v/f/c or weight loss.  HEENT: Patient denies persistent postnasal drip, scleral icterus, drooling, persistent bleeding from nose/mouth.  Resp: Patient denies SOB, wheezing, productive cough.  Cards: Patient denies CP, palpitations, significant edema  GI: As above.  Derm: Patient denies jaundice/rashes.

## 2025-05-29 ENCOUNTER — OFFICE VISIT (OUTPATIENT)
Dept: CARDIOLOGY CLINIC | Age: 65
End: 2025-05-29
Payer: MEDICARE

## 2025-05-29 VITALS
HEIGHT: 71 IN | HEART RATE: 80 BPM | TEMPERATURE: 97.7 F | BODY MASS INDEX: 35.5 KG/M2 | RESPIRATION RATE: 18 BRPM | OXYGEN SATURATION: 94 % | DIASTOLIC BLOOD PRESSURE: 72 MMHG | SYSTOLIC BLOOD PRESSURE: 130 MMHG | WEIGHT: 253.6 LBS

## 2025-05-29 DIAGNOSIS — E78.2 MIXED HYPERLIPIDEMIA: ICD-10-CM

## 2025-05-29 DIAGNOSIS — I10 PRIMARY HYPERTENSION: ICD-10-CM

## 2025-05-29 DIAGNOSIS — I48.0 PAROXYSMAL ATRIAL FIBRILLATION (HCC): Primary | ICD-10-CM

## 2025-05-29 PROCEDURE — 3075F SYST BP GE 130 - 139MM HG: CPT | Performed by: INTERNAL MEDICINE

## 2025-05-29 PROCEDURE — 93000 ELECTROCARDIOGRAM COMPLETE: CPT | Performed by: INTERNAL MEDICINE

## 2025-05-29 PROCEDURE — 99214 OFFICE O/P EST MOD 30 MIN: CPT | Performed by: INTERNAL MEDICINE

## 2025-05-29 PROCEDURE — 3078F DIAST BP <80 MM HG: CPT | Performed by: INTERNAL MEDICINE

## 2025-05-29 PROCEDURE — 1123F ACP DISCUSS/DSCN MKR DOCD: CPT | Performed by: INTERNAL MEDICINE

## 2025-05-29 PROCEDURE — G2211 COMPLEX E/M VISIT ADD ON: HCPCS | Performed by: INTERNAL MEDICINE

## 2025-05-29 RX ORDER — SEMAGLUTIDE 0.68 MG/ML
0.25 INJECTION, SOLUTION SUBCUTANEOUS WEEKLY
COMMUNITY
Start: 2025-05-10

## 2025-05-29 NOTE — PROGRESS NOTES
years      Sex: Male      Is Non- : No      Diabetic: Yes      Tobacco smoker: No      Systolic Blood Pressure: 130 mmHg      Is BP treated: Yes      HDL Cholesterol: 34 mg/dL      Total Cholesterol: 175 mg/dL        ASSESSMENT:  New onset AF with RVR>> NSR and remains in NSR  [id number] score-2 on Xarelto  Hypertension,  not well controlled, /78  Recent UG bleed blood loss anemia with Hb 6 and got 2 PRBC.  Mixed hyperlipidemia, on crestor, currently on hold due to high LFTs  Abnormal liver function secondary to NSAID use, improving and close to normal   Type 2 diabetes not on insulin, on metformin.  MICHELLE secondary to NSAID use and decreased oral intake, improving, creat 8.1>>5.8>>2.8>>1.5>>1.3  Non coronavirus upper respiratory infection with secondary bacterial sinusitis resolved.  Mild hypokalemia, K 4.2>>3.3 Mild anemia, Hb 10.1>>9.5  JINA on C-pap, compliant with C-pap use    Plan:   Exercise stress test results were reviewed and discussed patient no evidence of ischemia.  He exercised for 7 minutes without any chest pain or dyspnea.  Advised to call me if he develops any further episodes of A-fib then we can consider adding flecainide as a pill in a pocket for rhythm control  Kardia device results were reviewed and no evidence of A-fib over the last 6 months.  Continue Cardizem 120 mg p.o. daily  Continue rosuvastatin 10 mg p.o. daily and fenofibrate 160 mg p.o. daily.  Continue losartan 100 mg p.o. daily for hypertension.  Continue XARELTO 20  mg po DAILY  for anticoagulation   Continue metoprolol 25 mg po twice a day for rate control  Continue rosuvastatin 10 mg po daily and fenofibrate   Continue Ozempic for DM and weight loss management.   Advised to avoid otc NSAIDS   Continue rest of the medications as per Dr. Limon  Follow up with me in 6 months.      The patient's current medication list, allergies, problem list and results of all previously ordered testing were

## 2025-05-29 NOTE — PATIENT INSTRUCTIONS
Exercise stress test results were reviewed and discussed patient no evidence of ischemia.  He exercised for 7 minutes without any chest pain or dyspnea.  Advised to call me if he develops any further episodes of A-fib then we can consider adding flecainide as a pill in a pocket for rhythm control  Kardia device results were reviewed and no evidence of A-fib over the last 6 months.  Continue Cardizem 120 mg p.o. daily  Continue rosuvastatin 10 mg p.o. daily and fenofibrate 160 mg p.o. daily.  Continue losartan 100 mg p.o. daily for hypertension.  Continue XARELTO 20  mg po DAILY  for anticoagulation   Continue metoprolol 25 mg po twice a day for rate control  Continue rosuvastatin 10 mg po daily and fenofibrate   Continue Ozempic for DM and wt loss.   Advised to avoid otc NSAIDS   Continue rest of the medications as per Dr. Limon  Follow up with me in 6 months.

## 2025-06-16 ENCOUNTER — HOSPITAL ENCOUNTER (OUTPATIENT)
Age: 65
Discharge: HOME | End: 2025-06-16
Payer: MEDICARE

## 2025-06-16 LAB
ALBUMIN SERPL-MCNC: 4.5 G/DL (ref 3.5–5.2)
ALP SERPL-CCNC: 47 U/L (ref 40–129)
ALT SERPL-CCNC: 25 U/L (ref 0–50)
ANION GAP SERPL CALCULATED.3IONS-SCNC: 11 MMOL/L (ref 7–16)
AST SERPL-CCNC: 23 U/L (ref 0–50)
BILIRUB SERPL-MCNC: 0.5 MG/DL (ref 0–1.2)
BUN SERPL-MCNC: 19 MG/DL (ref 8–23)
CALCIUM SERPL-MCNC: 9.7 MG/DL (ref 8.8–10.2)
CHLORIDE SERPL-SCNC: 106 MMOL/L (ref 98–107)
CHOLEST SERPL-MCNC: 161 MG/DL
CO2 SERPL-SCNC: 22 MMOL/L (ref 22–29)
CREAT SERPL-MCNC: 1.1 MG/DL (ref 0.7–1.2)
CREAT UR-MCNC: 55.6 MG/DL (ref 40–278)
GFR, ESTIMATED: 72 ML/MIN/1.73M2
GLUCOSE SERPL-MCNC: 111 MG/DL (ref 74–99)
HBA1C MFR BLD: 6.1 % (ref 4–5.6)
HDLC SERPL-MCNC: 44 MG/DL
LDLC SERPL CALC-MCNC: 80 MG/DL
MICROALBUMIN UR-MCNC: <12 MG/L (ref 0–20)
MICROALBUMIN/CREAT UR-RTO: <22 MCG/MG CREAT (ref 0–30)
POTASSIUM SERPL-SCNC: 4.4 MMOL/L (ref 3.5–5.1)
PROT SERPL-MCNC: 6.9 G/DL (ref 6.4–8.3)
PSA SERPL-MCNC: 2.29 NG/ML (ref 0–4)
SODIUM SERPL-SCNC: 139 MMOL/L (ref 136–145)
TRIGL SERPL-MCNC: 185 MG/DL
VLDLC SERPL CALC-MCNC: 37 MG/DL

## 2025-06-16 PROCEDURE — 36415 COLL VENOUS BLD VENIPUNCTURE: CPT

## 2025-06-16 PROCEDURE — 82570 ASSAY OF URINE CREATININE: CPT

## 2025-06-16 PROCEDURE — 80061 LIPID PANEL: CPT

## 2025-06-16 PROCEDURE — 83036 HEMOGLOBIN GLYCOSYLATED A1C: CPT

## 2025-06-16 PROCEDURE — 82043 UR ALBUMIN QUANTITATIVE: CPT

## 2025-06-16 PROCEDURE — G0103 PSA SCREENING: HCPCS

## 2025-06-16 PROCEDURE — 80053 COMPREHEN METABOLIC PANEL: CPT

## 2025-08-15 RX ORDER — DILTIAZEM HYDROCHLORIDE 120 MG/1
120 CAPSULE, COATED, EXTENDED RELEASE ORAL DAILY
Qty: 90 CAPSULE | Refills: 3 | Status: SHIPPED | OUTPATIENT
Start: 2025-08-15

## (undated) DEVICE — AGENT NEXT GEN VIRTUAL KT SURGIFLO

## (undated) DEVICE — COAGULATOR SUCT 10FR LAIN FTSWCH ACTIVATION DISP VALLEYLAB

## (undated) DEVICE — SHEATH 1912010 5PK 4MM/30DEG STORZ XOMED: Brand: ENDO-SCRUB®

## (undated) DEVICE — DUAL LUMEN STOMACH TUBE: Brand: SALEM SUMP

## (undated) DEVICE — SWABSTICK MEDICATED L4IN BENZ TINC SKIN PREP APLICARE

## (undated) DEVICE — DEVICE INFL PRSS G INDIC DISP (MUST BE PURC IN MULTIPLES OF 5)

## (undated) DEVICE — SINU FOAM: Brand: SINU-FOAM

## (undated) DEVICE — SYRINGE,CONTROL,LL,FINGER,GRIP: Brand: MEDLINE INDUSTRIES, INC.

## (undated) DEVICE — FORCEPS BX L240CM JAW DIA2.4MM ORNG L CAP W/ NDL DISP RAD

## (undated) DEVICE — BALLOON SINUPLASTY 6X16 MM SYS RELIEVA SPINPLUS

## (undated) DEVICE — NEEDLE HYPO 25GA L1.5IN BLU POLYPR HUB S STL REG BVL STR

## (undated) DEVICE — 4-PORT MANIFOLD: Brand: NEPTUNE 2

## (undated) DEVICE — SHEATH 1912000 5PK 4MM/0DEG STORZ XOMED: Brand: ENDO-SCRUB®

## (undated) DEVICE — TOWEL,OR,DSP,ST,BLUE,STD,6/PK,12PK/CS: Brand: MEDLINE

## (undated) DEVICE — Device

## (undated) DEVICE — GLOVE ORANGE PI 7 1/2   MSG9075

## (undated) DEVICE — TUBING, SUCTION, 3/16" X 12', STRAIGHT: Brand: MEDLINE

## (undated) DEVICE — CODMAN® SURGICAL PATTIES 1/2" X 3" (1.27CM X 7.62CM): Brand: CODMAN®

## (undated) DEVICE — SURGICAL PROCEDURE PACK EENT CUST

## (undated) DEVICE — YANKAUER,OPEN TIP,W/O VENT,STERILE: Brand: MEDLINE INDUSTRIES, INC.

## (undated) DEVICE — ELECTRODE PT RET AD L9FT HI MOIST COND ADH HYDRGEL CORDED

## (undated) DEVICE — SYRINGE MED 50ML LUERLOCK TIP

## (undated) DEVICE — GAUZE,SPONGE,4"X4",8PLY,STRL,LF,10/TRAY: Brand: MEDLINE

## (undated) DEVICE — FORCEPS BX L160CM JAW DIA2.4MM YEL L CAP W/ NDL DISP RAD

## (undated) DEVICE — NEEDLE SPNL 25GA L3.5IN BLU HUB S STL REG WALL FIT STYL W/

## (undated) DEVICE — NEEDLE FLTR 18GA L1.5IN MEM THK5UM BLNT DISP

## (undated) DEVICE — CONTAINER SPEC 60ML PH 7NEUTRAL BUFF FRMLN RDY TO USE

## (undated) DEVICE — COUNTER NDL 30 COUNT DBL MAG

## (undated) DEVICE — INTENDED FOR TISSUE SEPARATION, AND OTHER PROCEDURES THAT REQUIRE A SHARP SURGICAL BLADE TO PUNCTURE OR CUT.: Brand: BARD-PARKER ® STAINLESS STEEL BLADES

## (undated) DEVICE — DEFENDO AIR WATER SUCTION AND BIOPSY VALVE KIT FOR  OLYMPUS: Brand: DEFENDO AIR/WATER/SUCTION AND BIOPSY VALVE

## (undated) DEVICE — TAPE ADH W1INXL10YD WHT PAPR GENTLE BRTH FLX COMFORTABLE

## (undated) DEVICE — BANDAGE,GAUZE,BULKEE II,4.5"X4.1YD,STRL: Brand: MEDLINE

## (undated) DEVICE — FORMALIN  10%NBF 60ML PREFLL CONT

## (undated) DEVICE — MAGNETIC INSTR DRAPE 20X16: Brand: MEDLINE INDUSTRIES, INC.

## (undated) DEVICE — SPONGE GZ W4XL4IN RAYON POLY FILL CVR W/ NONWOVEN FAB

## (undated) DEVICE — KIT,ANTI FOG,W/SPONGE & FLUID,SOFT PACK: Brand: MEDLINE

## (undated) DEVICE — MARKER,SKIN,WI/RULER AND LABELS: Brand: MEDLINE

## (undated) DEVICE — BITEBLOCK 54FR W/ DENT RIM BLOX

## (undated) DEVICE — TRAP SPEC POLYP REM STRNR CLN DSGN MAGNIFYING WIND DISP

## (undated) DEVICE — STRIP,CLOSURE,WOUND,MEDI-STRIP,1/2X4: Brand: MEDLINE

## (undated) DEVICE — SNARE VASC L240CM LOOP W10MM SHTH DIA2.4MM RND STIFF CLD

## (undated) DEVICE — DOUBLE BASIN SET: Brand: MEDLINE INDUSTRIES, INC.

## (undated) DEVICE — CONNECTOR IRRIGATION AUXILIARY H2O JET W/ PRT MTL THRD HYDR